# Patient Record
Sex: FEMALE | Race: BLACK OR AFRICAN AMERICAN | NOT HISPANIC OR LATINO | Employment: UNEMPLOYED | URBAN - METROPOLITAN AREA
[De-identification: names, ages, dates, MRNs, and addresses within clinical notes are randomized per-mention and may not be internally consistent; named-entity substitution may affect disease eponyms.]

---

## 2020-08-31 ENCOUNTER — HOSPITAL ENCOUNTER (OUTPATIENT)
Dept: ULTRASOUND IMAGING | Facility: HOSPITAL | Age: 60
Discharge: HOME | End: 2020-08-31
Attending: NURSE PRACTITIONER

## 2020-11-16 ENCOUNTER — HOSPITAL ENCOUNTER (OUTPATIENT)
Dept: PREADMISSION TESTING | Facility: HOSPITAL | Age: 60
Discharge: HOME | End: 2020-11-16
Attending: OTOLARYNGOLOGY

## 2020-11-17 LAB — SARS-COV-2 RNA SPEC QL NAA+PROBE: NOT DETECTED

## 2020-11-18 ENCOUNTER — HOSPITAL ENCOUNTER (OUTPATIENT)
Dept: GENERAL RADIOLOGY | Facility: HOSPITAL | Age: 60
Discharge: HOME | End: 2020-11-18
Attending: OTOLARYNGOLOGY

## 2020-11-18 LAB
ANION GAP SERPL CALC-SCNC: 11 MMOL/L (ref 8–19)
APTT BLD: 24.5 S (ref 22.2–34.2)
BASOPHILS # BLD AUTO: 0.06 10*3/UL (ref 0–0.2)
BASOPHILS NFR BLD AUTO: 0.7 % (ref 0–3)
BUN SERPL-MCNC: 11 MG/DL (ref 5–25)
BUN/CREAT SERPL: 17 {RATIO} (ref 6–20)
CALCIUM SERPL-MCNC: 10.1 MG/DL (ref 8.7–10.4)
CHLORIDE SERPL-SCNC: 108 MMOL/L (ref 99–111)
CONV ABS IMM GRAN: 0.02 10*3/UL (ref 0–0.2)
CONV CO2: 24 MMOL/L (ref 22–32)
CONV IMMATURE GRAN: 0.2 % (ref 0–1.8)
CREAT UR-MCNC: 0.66 MG/DL (ref 0.5–0.9)
DEPRECATED RDW RBC AUTO: 41.2 FL (ref 36.4–46.3)
EOSINOPHIL # BLD AUTO: 0.21 10*3/UL (ref 0–0.7)
EOSINOPHIL # BLD AUTO: 2.6 % (ref 0–7)
ERYTHROCYTE [DISTWIDTH] IN BLOOD BY AUTOMATED COUNT: 12.1 % (ref 11.7–14.4)
GFR SERPLBLD BASED ON 1.73 SQ M-ARVRAT: >60 ML/MIN/{1.73_M2}
GLUCOSE SERPL-MCNC: 104 MG/DL (ref 65–99)
HCT VFR BLD AUTO: 41.3 % (ref 37–47)
HGB BLD-MCNC: 14.3 G/DL (ref 12–16)
INR PPP: 1.01 (ref 2–3)
LYMPHOCYTES # BLD AUTO: 4.73 10*3/UL (ref 1–5)
LYMPHOCYTES NFR BLD AUTO: 57.8 % (ref 20–45)
MCH RBC QN AUTO: 32 PG (ref 27–31)
MCHC RBC AUTO-ENTMCNC: 34.6 G/DL (ref 33–37)
MCV RBC AUTO: 92.4 FL (ref 81–99)
MONOCYTES # BLD AUTO: 0.68 10*3/UL (ref 0.2–1.2)
MONOCYTES NFR BLD AUTO: 8.3 % (ref 3–10)
NEUTROPHILS # BLD AUTO: 2.49 10*3/UL (ref 2–8)
NEUTROPHILS NFR BLD AUTO: 30.4 % (ref 30–85)
NRBC CBCN: 0 % (ref 0–0.7)
OSMOLALITY SERPL CALC.SUM OF ELEC: 288 MOSM/KG (ref 273–304)
PLATELET # BLD AUTO: 337 10*3/UL (ref 130–400)
PMV BLD AUTO: 8.4 FL (ref 9.4–12.3)
POTASSIUM SERPL-SCNC: 4 MMOL/L (ref 3.5–5.3)
PROTHROMBIN TIME: 10.8 S (ref 9.4–12)
RBC # BLD AUTO: 4.47 10*6/UL (ref 4.2–5.4)
SODIUM SERPL-SCNC: 139 MMOL/L (ref 135–147)
WBC # BLD AUTO: 8.19 10*3/UL (ref 4.8–10.8)

## 2020-11-20 ENCOUNTER — HOSPITAL ENCOUNTER (OUTPATIENT)
Dept: PERIOP | Facility: HOSPITAL | Age: 60
Setting detail: HOSPITAL OUTPATIENT SURGERY
Discharge: HOME | End: 2020-11-20
Attending: OTOLARYNGOLOGY

## 2021-09-18 ENCOUNTER — APPOINTMENT (OUTPATIENT)
Dept: CT IMAGING | Facility: HOSPITAL | Age: 61
End: 2021-09-18

## 2021-09-18 ENCOUNTER — APPOINTMENT (OUTPATIENT)
Dept: MRI IMAGING | Facility: HOSPITAL | Age: 61
End: 2021-09-18

## 2021-09-18 ENCOUNTER — HOSPITAL ENCOUNTER (EMERGENCY)
Facility: HOSPITAL | Age: 61
Discharge: HOME | End: 2021-09-18
Attending: EMERGENCY MEDICINE | Admitting: EMERGENCY MEDICINE

## 2021-09-18 ENCOUNTER — APPOINTMENT (OUTPATIENT)
Dept: GENERAL RADIOLOGY | Facility: HOSPITAL | Age: 61
End: 2021-09-18

## 2021-09-18 VITALS
TEMPERATURE: 98.1 F | HEART RATE: 98 BPM | SYSTOLIC BLOOD PRESSURE: 147 MMHG | WEIGHT: 175.71 LBS | OXYGEN SATURATION: 98 % | DIASTOLIC BLOOD PRESSURE: 101 MMHG | HEIGHT: 66 IN | BODY MASS INDEX: 28.24 KG/M2 | RESPIRATION RATE: 16 BRPM

## 2021-09-18 DIAGNOSIS — H81.10 BENIGN PAROXYSMAL POSITIONAL VERTIGO, UNSPECIFIED LATERALITY: Primary | ICD-10-CM

## 2021-09-18 LAB
ALBUMIN SERPL-MCNC: 4.5 G/DL (ref 3.5–5.2)
ALBUMIN/GLOB SERPL: 1.6 G/DL
ALP SERPL-CCNC: 79 U/L (ref 39–117)
ALT SERPL W P-5'-P-CCNC: 19 U/L (ref 1–33)
ANION GAP SERPL CALCULATED.3IONS-SCNC: 10 MMOL/L (ref 5–15)
AST SERPL-CCNC: 16 U/L (ref 1–32)
BASOPHILS # BLD AUTO: 0.06 10*3/MM3 (ref 0–0.2)
BASOPHILS NFR BLD AUTO: 0.8 % (ref 0–1.5)
BILIRUB SERPL-MCNC: 0.5 MG/DL (ref 0–1.2)
BILIRUB UR QL STRIP: NEGATIVE
BUN SERPL-MCNC: 13 MG/DL (ref 8–23)
BUN/CREAT SERPL: 18.1 (ref 7–25)
CALCIUM SPEC-SCNC: 10 MG/DL (ref 8.6–10.5)
CHLORIDE SERPL-SCNC: 106 MMOL/L (ref 98–107)
CLARITY UR: CLEAR
CO2 SERPL-SCNC: 24 MMOL/L (ref 22–29)
COLOR UR: YELLOW
CREAT SERPL-MCNC: 0.72 MG/DL (ref 0.57–1)
DEPRECATED RDW RBC AUTO: 43.7 FL (ref 37–54)
EOSINOPHIL # BLD AUTO: 0.22 10*3/MM3 (ref 0–0.4)
EOSINOPHIL NFR BLD AUTO: 2.9 % (ref 0.3–6.2)
ERYTHROCYTE [DISTWIDTH] IN BLOOD BY AUTOMATED COUNT: 12.8 % (ref 12.3–15.4)
GFR SERPL CREATININE-BSD FRML MDRD: 100 ML/MIN/1.73
GLOBULIN UR ELPH-MCNC: 2.8 GM/DL
GLUCOSE BLDC GLUCOMTR-MCNC: 109 MG/DL (ref 70–99)
GLUCOSE SERPL-MCNC: 103 MG/DL (ref 65–99)
GLUCOSE UR STRIP-MCNC: NEGATIVE MG/DL
HCT VFR BLD AUTO: 45.2 % (ref 34–46.6)
HGB BLD-MCNC: 15.7 G/DL (ref 12–15.9)
HGB UR QL STRIP.AUTO: NEGATIVE
HOLD SPECIMEN: NORMAL
HOLD SPECIMEN: NORMAL
IMM GRANULOCYTES # BLD AUTO: 0.02 10*3/MM3 (ref 0–0.05)
IMM GRANULOCYTES NFR BLD AUTO: 0.3 % (ref 0–0.5)
KETONES UR QL STRIP: NEGATIVE
LEUKOCYTE ESTERASE UR QL STRIP.AUTO: NEGATIVE
LYMPHOCYTES # BLD AUTO: 4.1 10*3/MM3 (ref 0.7–3.1)
LYMPHOCYTES NFR BLD AUTO: 53.9 % (ref 19.6–45.3)
MAGNESIUM SERPL-MCNC: 2 MG/DL (ref 1.6–2.4)
MCH RBC QN AUTO: 32.2 PG (ref 26.6–33)
MCHC RBC AUTO-ENTMCNC: 34.7 G/DL (ref 31.5–35.7)
MCV RBC AUTO: 92.6 FL (ref 79–97)
MONOCYTES # BLD AUTO: 0.59 10*3/MM3 (ref 0.1–0.9)
MONOCYTES NFR BLD AUTO: 7.8 % (ref 5–12)
NEUTROPHILS NFR BLD AUTO: 2.62 10*3/MM3 (ref 1.7–7)
NEUTROPHILS NFR BLD AUTO: 34.3 % (ref 42.7–76)
NITRITE UR QL STRIP: NEGATIVE
NRBC BLD AUTO-RTO: 0 /100 WBC (ref 0–0.2)
PH UR STRIP.AUTO: 7.5 [PH] (ref 5–8)
PLATELET # BLD AUTO: 341 10*3/MM3 (ref 140–450)
PMV BLD AUTO: 8.5 FL (ref 6–12)
POTASSIUM SERPL-SCNC: 4 MMOL/L (ref 3.5–5.2)
PROT SERPL-MCNC: 7.3 G/DL (ref 6–8.5)
PROT UR QL STRIP: NEGATIVE
QT INTERVAL: 426 MS
RBC # BLD AUTO: 4.88 10*6/MM3 (ref 3.77–5.28)
SODIUM SERPL-SCNC: 140 MMOL/L (ref 136–145)
SP GR UR STRIP: 1.01 (ref 1–1.03)
TROPONIN T SERPL-MCNC: <0.01 NG/ML (ref 0–0.03)
UROBILINOGEN UR QL STRIP: NORMAL
WBC # BLD AUTO: 7.61 10*3/MM3 (ref 3.4–10.8)
WHOLE BLOOD HOLD SPECIMEN: NORMAL
WHOLE BLOOD HOLD SPECIMEN: NORMAL

## 2021-09-18 PROCEDURE — 80053 COMPREHEN METABOLIC PANEL: CPT | Performed by: EMERGENCY MEDICINE

## 2021-09-18 PROCEDURE — 25010000002 LORAZEPAM PER 2 MG: Performed by: EMERGENCY MEDICINE

## 2021-09-18 PROCEDURE — 99284 EMERGENCY DEPT VISIT MOD MDM: CPT

## 2021-09-18 PROCEDURE — 83735 ASSAY OF MAGNESIUM: CPT | Performed by: EMERGENCY MEDICINE

## 2021-09-18 PROCEDURE — 93005 ELECTROCARDIOGRAM TRACING: CPT | Performed by: EMERGENCY MEDICINE

## 2021-09-18 PROCEDURE — 96375 TX/PRO/DX INJ NEW DRUG ADDON: CPT

## 2021-09-18 PROCEDURE — 85025 COMPLETE CBC W/AUTO DIFF WBC: CPT | Performed by: EMERGENCY MEDICINE

## 2021-09-18 PROCEDURE — 71045 X-RAY EXAM CHEST 1 VIEW: CPT

## 2021-09-18 PROCEDURE — 84484 ASSAY OF TROPONIN QUANT: CPT | Performed by: EMERGENCY MEDICINE

## 2021-09-18 PROCEDURE — 70551 MRI BRAIN STEM W/O DYE: CPT

## 2021-09-18 PROCEDURE — 96374 THER/PROPH/DIAG INJ IV PUSH: CPT

## 2021-09-18 PROCEDURE — 70450 CT HEAD/BRAIN W/O DYE: CPT

## 2021-09-18 PROCEDURE — 81003 URINALYSIS AUTO W/O SCOPE: CPT | Performed by: EMERGENCY MEDICINE

## 2021-09-18 PROCEDURE — 82962 GLUCOSE BLOOD TEST: CPT

## 2021-09-18 PROCEDURE — 93010 ELECTROCARDIOGRAM REPORT: CPT | Performed by: INTERNAL MEDICINE

## 2021-09-18 PROCEDURE — 96376 TX/PRO/DX INJ SAME DRUG ADON: CPT

## 2021-09-18 PROCEDURE — 96361 HYDRATE IV INFUSION ADD-ON: CPT

## 2021-09-18 PROCEDURE — 25010000002 HYDRALAZINE PER 20 MG: Performed by: EMERGENCY MEDICINE

## 2021-09-18 RX ORDER — MECLIZINE HYDROCHLORIDE 25 MG/1
25 TABLET ORAL 3 TIMES DAILY PRN
COMMUNITY

## 2021-09-18 RX ORDER — HYDRALAZINE HYDROCHLORIDE 20 MG/ML
10 INJECTION INTRAMUSCULAR; INTRAVENOUS ONCE
Status: COMPLETED | OUTPATIENT
Start: 2021-09-18 | End: 2021-09-18

## 2021-09-18 RX ORDER — SODIUM CHLORIDE 0.9 % (FLUSH) 0.9 %
10 SYRINGE (ML) INJECTION AS NEEDED
Status: DISCONTINUED | OUTPATIENT
Start: 2021-09-18 | End: 2021-09-18 | Stop reason: HOSPADM

## 2021-09-18 RX ORDER — LORAZEPAM 2 MG/ML
1 INJECTION INTRAMUSCULAR ONCE
Status: COMPLETED | OUTPATIENT
Start: 2021-09-18 | End: 2021-09-18

## 2021-09-18 RX ORDER — LISINOPRIL 5 MG/1
5 TABLET ORAL DAILY
COMMUNITY
End: 2022-03-17

## 2021-09-18 RX ORDER — ONDANSETRON 4 MG/1
4 TABLET, ORALLY DISINTEGRATING ORAL EVERY 4 HOURS
Qty: 30 TABLET | Refills: 0 | Status: SHIPPED | OUTPATIENT
Start: 2021-09-18 | End: 2021-09-23

## 2021-09-18 RX ADMIN — SODIUM CHLORIDE 500 ML: 9 INJECTION, SOLUTION INTRAVENOUS at 10:01

## 2021-09-18 RX ADMIN — HYDRALAZINE HYDROCHLORIDE 10 MG: 20 INJECTION INTRAMUSCULAR; INTRAVENOUS at 11:36

## 2021-09-18 RX ADMIN — SODIUM CHLORIDE, PRESERVATIVE FREE 10 ML: 5 INJECTION INTRAVENOUS at 10:05

## 2021-09-18 RX ADMIN — LORAZEPAM 1 MG: 2 INJECTION INTRAMUSCULAR; INTRAVENOUS at 13:20

## 2021-09-18 RX ADMIN — HYDRALAZINE HYDROCHLORIDE 10 MG: 20 INJECTION INTRAMUSCULAR; INTRAVENOUS at 10:02

## 2021-09-18 NOTE — ED PROVIDER NOTES
"Time: 9:11 AM EDT  Arrived by: private car  Chief Complaint: dizziness  History provided by: patient  History is limited by: N/A     History of Present Illness:  Patient is a 61 y.o. year old female that presents to the emergency department with DIZZINESS. Last known well was about 2300 last night when the patient went to bed. Patient reports she woke up with dizziness at 0800. Patient describes dizziness as lightheadedness. She states she feels like \"she is going to fall.\" Dizziness is worse with standing. Patient denies the vertigo.     Patient denies any numbness/tingling, one-sided weakness or headache. Family denies the patient having any dysarthria or appearing confused.     She denies any recent illness. She denies fever, cough, diarrhea, dysuria or ear pain.     Patient reports she had a TIA about 1 year ago with similar symptoms of dizziness.     Dizziness  Quality:  Lightheadedness  Severity:  Moderate  Duration:  1 hour  Worsened by:  Standing up  Associated symptoms: no chest pain, no diarrhea, no headaches, no nausea, no shortness of breath, no vomiting and no weakness    Risk factors comment:  Hx of TIA      Patient Care Team  Primary Care Provider: Shanta Jensen APRN    Past Medical History:     No Known Allergies  Past Medical History:   Diagnosis Date   • Hypertension    • TIA (transient ischemic attack)    • Vertigo      Past Surgical History:   Procedure Laterality Date   •  SECTION     • THYROID SURGERY     • US GUIDED FINE NEEDLE ASPIRATION  2020     History reviewed. No pertinent family history.    Home Medications:  Prior to Admission medications    Not on File        Social History:   Social History     Tobacco Use   • Smoking status: Former Smoker   • Tobacco comment: quit 10 mos ago    Substance Use Topics   • Alcohol use: Never   • Drug use: Never       Review of Systems:  Review of Systems   Constitutional: Negative for chills and fever.   HENT: Negative for ear pain.  " "  Eyes: Negative for photophobia.   Respiratory: Negative for cough and shortness of breath.    Cardiovascular: Negative for chest pain.   Gastrointestinal: Negative for abdominal pain, diarrhea, nausea and vomiting.   Genitourinary: Negative for dysuria.   Musculoskeletal: Negative for back pain and neck pain.   Skin: Negative for rash.   Neurological: Positive for dizziness and light-headedness. Negative for speech difficulty, weakness, numbness and headaches.        Physical Exam:  /81   Pulse 102   Temp 97.7 °F (36.5 °C) (Oral)   Resp 18   Ht 167.6 cm (66\")   Wt 79.7 kg (175 lb 11.3 oz)   SpO2 97%   BMI 28.36 kg/m²     Physical Exam  Vitals and nursing note reviewed.   Constitutional:       General: She is not in acute distress.  HENT:      Head: Normocephalic and atraumatic.   Cardiovascular:      Rate and Rhythm: Normal rate and regular rhythm.      Heart sounds: No murmur heard.     Pulmonary:      Effort: No respiratory distress.      Breath sounds: Normal breath sounds.   Abdominal:      Palpations: Abdomen is soft.      Tenderness: There is no abdominal tenderness.   Musculoskeletal:      Cervical back: Neck supple.   Skin:     General: Skin is warm and dry.      Findings: No rash.   Neurological:      General: No focal deficit present.      Mental Status: She is alert and oriented to person, place, and time.      Cranial Nerves: Cranial nerves are intact.      Sensory: Sensation is intact.      Motor: Motor function is intact.      Coordination: Coordination is intact.                Medications in the Emergency Department:  Medications   sodium chloride 0.9 % flush 10 mL (10 mL Intravenous Given 9/18/21 1005)   sodium chloride 0.9 % bolus 500 mL (0 mL Intravenous Stopped 9/18/21 1320)   hydrALAZINE (APRESOLINE) injection 10 mg (10 mg Intravenous Given 9/18/21 1002)   hydrALAZINE (APRESOLINE) injection 10 mg (10 mg Intravenous Given 9/18/21 1136)   LORazepam (ATIVAN) injection 1 mg (1 mg " Intravenous Given 9/18/21 1320)        Labs  Lab Results (last 24 hours)     Procedure Component Value Units Date/Time    CBC & Differential [[id number]]  (Abnormal) Collected: 09/18/21 0858    Specimen: Blood from Arm, Left Updated: 09/18/21 0917    Narrative:      The following orders were created for panel order CBC & Differential.  Procedure                               Abnormality         Status                     ---------                               -----------         ------                     CBC Auto Differential[[id number]]        Abnormal            Final result                 Please view results for these tests on the individual orders.    Comprehensive Metabolic Panel [[id number]]  (Abnormal) Collected: 09/18/21 0858    Specimen: Blood from Arm, Left Updated: 09/18/21 0947     Glucose 103 mg/dL      BUN 13 mg/dL      Creatinine 0.72 mg/dL      Sodium 140 mmol/L      Potassium 4.0 mmol/L      Chloride 106 mmol/L      CO2 24.0 mmol/L      Calcium 10.0 mg/dL      Total Protein 7.3 g/dL      Albumin 4.50 g/dL      ALT (SGPT) 19 U/L      AST (SGOT) 16 U/L      Alkaline Phosphatase 79 U/L      Total Bilirubin 0.5 mg/dL      eGFR  African Amer 100 mL/min/1.73      Globulin 2.8 gm/dL      A/G Ratio 1.6 g/dL      BUN/Creatinine Ratio 18.1     Anion Gap 10.0 mmol/L     Narrative:      GFR Normal >60  Chronic Kidney Disease <60  Kidney Failure <15      Troponin [[id number]]  (Normal) Collected: 09/18/21 0858    Specimen: Blood from Arm, Left Updated: 09/18/21 0947     Troponin T <0.010 ng/mL     Narrative:      Troponin T Reference Range:  <= 0.03 ng/mL-   Negative for AMI  >0.03 ng/mL-     Abnormal for myocardial necrosis.  Clinicians would have to utilize clinical acumen, EKG, Troponin and serial changes to determine if it is an Acute Myocardial Infarction or myocardial injury due to an underlying chronic condition.       Results may be falsely decreased if patient taking Biotin.      Magnesium [[id number]]   (Normal) Collected: 09/18/21 0858    Specimen: Blood from Arm, Left Updated: 09/18/21 0947     Magnesium 2.0 mg/dL     CBC Auto Differential [[id number]]  (Abnormal) Collected: 09/18/21 0858    Specimen: Blood from Arm, Left Updated: 09/18/21 0917     WBC 7.61 10*3/mm3      RBC 4.88 10*6/mm3      Hemoglobin 15.7 g/dL      Hematocrit 45.2 %      MCV 92.6 fL      MCH 32.2 pg      MCHC 34.7 g/dL      RDW 12.8 %      RDW-SD 43.7 fl      MPV 8.5 fL      Platelets 341 10*3/mm3      Neutrophil % 34.3 %      Lymphocyte % 53.9 %      Monocyte % 7.8 %      Eosinophil % 2.9 %      Basophil % 0.8 %      Immature Grans % 0.3 %      Neutrophils, Absolute 2.62 10*3/mm3      Lymphocytes, Absolute 4.10 10*3/mm3      Monocytes, Absolute 0.59 10*3/mm3      Eosinophils, Absolute 0.22 10*3/mm3      Basophils, Absolute 0.06 10*3/mm3      Immature Grans, Absolute 0.02 10*3/mm3      nRBC 0.0 /100 WBC     POC Glucose Once [[id number]]  (Abnormal) Collected: 09/18/21 0905    Specimen: Blood Updated: 09/18/21 0906     Glucose 109 mg/dL      Comment: Serial Number: [id number]  [id number]       Urinalysis With Culture If Indicated - Urine, Clean Catch [[id number]]  (Normal) Collected: 09/18/21 0938    Specimen: Urine, Clean Catch Updated: 09/18/21 0958     Color, UA Yellow     Appearance, UA Clear     pH, UA 7.5     Specific Gravity, UA 1.007     Glucose, UA Negative     Ketones, UA Negative     Bilirubin, UA Negative     Blood, UA Negative     Protein, UA Negative     Leuk Esterase, UA Negative     Nitrite, UA Negative     Urobilinogen, UA 0.2 E.U./dL    Narrative:      Urine microscopic not indicated.           Imaging:  CT Head Without Contrast    Result Date: 9/18/2021  PROCEDURE: CT HEAD WO CONTRAST  COMPARISON:  [facility], CT, HEAD W/O CONTRAST, 8/09/2020, 18:40. INDICATIONS: dizziness  PROTOCOL:   Standard imaging protocol performed    RADIATION:   DLP: 954.7mGy*cm   MA and/or KV was adjusted to minimize radiation  dose.     TECHNIQUE: After obtaining the patient's consent, CT images were obtained without non-ionic intravenous contrast material.  FINDINGS:  There are some minimal periventricular white matter changes which could reflect more chronic small vessel ischemic change.  There is no underlying hemorrhage.  There is no midline shift.  There is vascular calcification around the carotid siphon areas and distal vertebral arteries.  The paranasal sinuses seem relatively clear.  An acute orbital abnormality is not apparent.  CONCLUSION:  1. An acute intracranial abnormality is not definitely identified. 2. Minimal periventricular white matter changes are suggested which could reflect more chronic small vessel ischemic change. 3. Atherosclerotic changes are noted.  The     CROW ATWOOD MD       Electronically Signed and Approved By: CROW ATWOOD MD on 9/18/2021 at 12:30             MRI Brain Without Contrast    Result Date: 9/18/2021  PROCEDURE: MRI BRAIN WO CONTRAST  COMPARISON: [facility], CT, CT HEAD WO CONTRAST, 9/18/2021, 12:25.  [facility], MR, BRAIN W/O CONTRAST, 7/13/2020, 13:29.  INDICATIONS: dizziness      TECHNIQUE: A variety of imaging planes and parameters were utilized for visualization of suspected pathology.  Images were performed without contrast.  FINDINGS:  There is no restricted diffusion.  There are some periventricular white matter changes as well some signal in the centrum semiovale areas that could reflect more chronic small vessel ischemic change.  There is no suggested blooming on susceptibility imaging.  The pituitary is not definitely enlarged.  No definite orbital abnormality is appreciated.  There is a mucous retention cyst or polyp in the left maxillary sinus.  CONCLUSION:  1. Progressive T2 signal changes involving the cerebral hemispheres that while nonspecific could be reflective of more chronic small vessel ischemic change.      CROW ATWOOD MD        Electronically Signed and Approved By: CROW ATWOOD MD on 9/18/2021 at 15:05             XR Chest 1 View    Result Date: 9/18/2021  PROCEDURE: XR CHEST 1 VW  COMPARISON: [facility], CR, CHEST PA/AP & LAT 2V, 3/17/2016, 22:25.  [facility], CR, CHEST PA/AP & LAT 2V, 7/13/2020, 14:59.  [facility], CR, CHEST PA/AP & LAT 2V, 11/18/2020, 16:40.  INDICATIONS: Weak/Dizzy/AMS triage protocol  FINDINGS:  The heart is not definitely enlarged.  There is prominence of interstitial markings involving the lungs.  This has been suggested and may be reflective of more chronic interstitial lung disease.  A superimposed acute on chronic process not excluded.  There are no pleural effusions.  CONCLUSION:  1. Prominence of interstitial markings involving both lungs.  This has been suggested and may be reflective of more chronic interstitial lung disease.  Correlation with patient's clinical history suggested.       CROW ATWOOD MD       Electronically Signed and Approved By: CROW ATWOOD MD on 9/18/2021 at 10:28               Procedures:  Procedures      Progress  ED Course as of Sep 18 1542   Sat Sep 18, 2021   0925 EKG interpretation: Normal sinus rhythm, rate 74, left axis deviation, normal QRS, normal LA interval, no acute ischemia.    [RP]      ED Course User Index  [RP] Maykel Kent MD                         NIHSS (NIH Stroke Scale/Score) reviewed and/or performed as part of the patient evaluation and treatment planning process.  The result associated with this review/performance is: 0        Medical Decision Making:  MDM  Number of Diagnoses or Management Options     Amount and/or Complexity of Data Reviewed  Clinical lab tests: reviewed  Tests in the radiology section of CPT®: reviewed  Tests in the medicine section of CPT®: reviewed  Independent visualization of images, tracings, or specimens: yes    Risk of Complications, Morbidity, and/or Mortality  Presenting problems:  moderate  Management options: minimal    Patient Progress  Patient progress: stable       Final diagnoses:   Benign paroxysmal positional vertigo, unspecified laterality        Disposition:  ED Disposition     ED Disposition Condition Comment    Discharge Stable           This medical record created using voice recognition software and may contain unintended errors.    Documentation assistance provided by Ankita Álvarez acting as scribe for Dr. Maykel Kent. Information recorded by the scribe was done at my direction and has been verified and validated by me.          Ankita Álvarez  09/18/21 0926       Maykel Kent MD  09/18/21 6927

## 2023-02-20 ENCOUNTER — OFFICE VISIT (OUTPATIENT)
Dept: FAMILY MEDICINE CLINIC | Facility: CLINIC | Age: 63
End: 2023-02-20
Payer: COMMERCIAL

## 2023-02-20 VITALS
BODY MASS INDEX: 27.05 KG/M2 | HEART RATE: 92 BPM | RESPIRATION RATE: 20 BRPM | SYSTOLIC BLOOD PRESSURE: 147 MMHG | OXYGEN SATURATION: 98 % | TEMPERATURE: 97.1 F | DIASTOLIC BLOOD PRESSURE: 105 MMHG | HEIGHT: 66 IN | WEIGHT: 168.3 LBS

## 2023-02-20 DIAGNOSIS — I10 PRIMARY HYPERTENSION: Primary | Chronic | ICD-10-CM

## 2023-02-20 DIAGNOSIS — Z13.29 SCREENING FOR THYROID DISORDER: ICD-10-CM

## 2023-02-20 DIAGNOSIS — F17.210 CIGARETTE NICOTINE DEPENDENCE WITHOUT COMPLICATION: Chronic | ICD-10-CM

## 2023-02-20 DIAGNOSIS — Z13.220 SCREENING, LIPID: ICD-10-CM

## 2023-02-20 DIAGNOSIS — R42 VERTIGO: Chronic | ICD-10-CM

## 2023-02-20 PROBLEM — G45.9 TRANSIENT ISCHEMIC ATTACK: Status: ACTIVE | Noted: 2023-02-20

## 2023-02-20 PROBLEM — G89.29 CHRONIC PAIN: Status: ACTIVE | Noted: 2023-02-20

## 2023-02-20 PROCEDURE — 99204 OFFICE O/P NEW MOD 45 MIN: CPT

## 2023-02-20 RX ORDER — ASPIRIN 81 MG/1
81 TABLET ORAL DAILY
COMMUNITY

## 2023-02-20 RX ORDER — LISINOPRIL 20 MG/1
20 TABLET ORAL DAILY
Qty: 30 TABLET | Refills: 2 | Status: SHIPPED | OUTPATIENT
Start: 2023-02-20 | End: 2023-03-30 | Stop reason: SDUPTHER

## 2023-02-20 RX ORDER — SCOLOPAMINE TRANSDERMAL SYSTEM 1 MG/1
PATCH, EXTENDED RELEASE TRANSDERMAL
COMMUNITY
Start: 2022-12-22 | End: 2023-02-20 | Stop reason: SDUPTHER

## 2023-02-20 RX ORDER — SCOLOPAMINE TRANSDERMAL SYSTEM 1 MG/1
1 PATCH, EXTENDED RELEASE TRANSDERMAL
Qty: 10 PATCH | Refills: 0 | Status: SHIPPED | OUTPATIENT
Start: 2023-02-20 | End: 2023-03-22

## 2023-02-20 NOTE — PROGRESS NOTES
Chief Complaint   Patient presents with   • Establish Care     Was going to [facility].    • Med Refill     Ran out of her blood pressure medication.        Subjective          Lauren Marie presents to [facility]    History of Present Illness  She is here to establish care. She was going to [facility] in [place] before she moved. She moved around 6 months ago. She has run out of her lisinopril and needs a refill on that. Her BP today is 147/105. She states it is not this high when she is taking her medication.       Past History:  Medical History: has a past medical history of Hypertension, Injury of back, Stroke (HCC), TIA (transient ischemic attack), and Vertigo.   Surgical History: has a past surgical history that includes US Guided Fine Needle Aspiration (2020); Thyroid surgery; and  section.   Family History: family history is not on file.   Social History: reports that she has been smoking cigarettes. She started smoking about 43 years ago. She has a 20.50 pack-year smoking history. She has been exposed to tobacco smoke. She has never used smokeless tobacco. She reports that she does not drink alcohol and does not use drugs.  Allergies: Patient has no known allergies.  (Not in a hospital admission)       Social History     Socioeconomic History   • Marital status:    Tobacco Use   • Smoking status: Every Day     Packs/day: 0.50     Years: 41.00     Pack years: 20.50     Types: Cigarettes     Start date:      Passive exposure: Current   • Smokeless tobacco: Never   • Tobacco comments:     Quit for two years but started back   Vaping Use   • Vaping Use: Never used   Substance and Sexual Activity   • Alcohol use: Never   • Drug use: Never   • Sexual activity: Yes     Partners: Male       Health Maintenance Due   Topic Date Due   • Pneumococcal Vaccine 0-64 (1 - PCV) Never done   • LUNG CANCER SCREENING  Never done       Objective  "    Vital Signs:   BP (!) 147/105 (BP Location: Left arm, Patient Position: Sitting)   Pulse 92   Temp 97.1 °F (36.2 °C) (Infrared)   Resp 20   Ht 167.6 cm (66\")   Wt 76.3 kg (168 lb 4.8 oz)   SpO2 98%   BMI 27.16 kg/m²       Physical Exam  Constitutional:       Appearance: Normal appearance.   HENT:      Nose: Nose normal.      Mouth/Throat:      Mouth: Mucous membranes are moist.   Cardiovascular:      Rate and Rhythm: Normal rate and regular rhythm.      Pulses: Normal pulses.      Heart sounds: Normal heart sounds.   Pulmonary:      Effort: Pulmonary effort is normal.      Breath sounds: Normal breath sounds.   Skin:     General: Skin is warm and dry.   Neurological:      General: No focal deficit present.      Mental Status: She is alert and oriented to person, place, and time.   Psychiatric:         Mood and Affect: Mood normal.         Behavior: Behavior normal.          Review of Systems   All other systems reviewed and are negative.       Result Review :                 Assessment and Plan    Diagnoses and all orders for this visit:    1. Primary hypertension (Primary)  -     lisinopril (PRINIVIL,ZESTRIL) 20 MG tablet; Take 1 tablet by mouth Daily.  Dispense: 30 tablet; Refill: 2  -     Scopolamine 1 MG/3DAYS patch; Place 1 patch on the skin as directed by provider Every 72 (Seventy-Two) Hours for 30 days.  Dispense: 10 patch; Refill: 0  -     CBC w AUTO Differential; Future  -     Comprehensive metabolic panel; Future    2. Vertigo  -     Scopolamine 1 MG/3DAYS patch; Place 1 patch on the skin as directed by provider Every 72 (Seventy-Two) Hours for 30 days.  Dispense: 10 patch; Refill: 0  -     CBC w AUTO Differential; Future  -     Comprehensive metabolic panel; Future    3. Cigarette nicotine dependence without complication    4. Screening for thyroid disorder  -     TSH+Free T4; Future    5. Screening, lipid  -     Lipid panel; Future          Pt thought to be clinically stable at this " time.    Follow Up   Return in about 3 months (around 5/20/2023).  Patient was given instructions and counseling regarding her condition or for health maintenance advice. Please see specific information pulled into the AVS if appropriate.

## 2023-03-21 ENCOUNTER — APPOINTMENT (OUTPATIENT)
Dept: GENERAL RADIOLOGY | Facility: HOSPITAL | Age: 63
End: 2023-03-21
Payer: COMMERCIAL

## 2023-03-21 ENCOUNTER — HOSPITAL ENCOUNTER (EMERGENCY)
Facility: HOSPITAL | Age: 63
Discharge: HOME | End: 2023-03-21
Attending: EMERGENCY MEDICINE | Admitting: EMERGENCY MEDICINE
Payer: COMMERCIAL

## 2023-03-21 ENCOUNTER — CLINICAL SUPPORT (OUTPATIENT)
Dept: FAMILY MEDICINE CLINIC | Facility: CLINIC | Age: 63
End: 2023-03-21
Payer: COMMERCIAL

## 2023-03-21 VITALS
DIASTOLIC BLOOD PRESSURE: 87 MMHG | SYSTOLIC BLOOD PRESSURE: 158 MMHG | TEMPERATURE: 98.1 F | WEIGHT: 166.89 LBS | BODY MASS INDEX: 26.19 KG/M2 | HEART RATE: 70 BPM | OXYGEN SATURATION: 99 % | RESPIRATION RATE: 18 BRPM | HEIGHT: 67 IN

## 2023-03-21 VITALS — DIASTOLIC BLOOD PRESSURE: 100 MMHG | SYSTOLIC BLOOD PRESSURE: 152 MMHG | HEART RATE: 83 BPM

## 2023-03-21 DIAGNOSIS — R42 DIZZINESS: Primary | ICD-10-CM

## 2023-03-21 DIAGNOSIS — R42 VERTIGO: ICD-10-CM

## 2023-03-21 DIAGNOSIS — I10 PRIMARY HYPERTENSION: Primary | ICD-10-CM

## 2023-03-21 LAB
ALBUMIN SERPL-MCNC: 4.3 G/DL (ref 3.5–5.2)
ALBUMIN/GLOB SERPL: 1.5 G/DL
ALP SERPL-CCNC: 84 U/L (ref 39–117)
ALT SERPL W P-5'-P-CCNC: 15 U/L (ref 1–33)
ANION GAP SERPL CALCULATED.3IONS-SCNC: 9.1 MMOL/L (ref 5–15)
AST SERPL-CCNC: 14 U/L (ref 1–32)
BACTERIA UR QL AUTO: ABNORMAL /HPF
BASOPHILS # BLD AUTO: 0.04 10*3/MM3 (ref 0–0.2)
BASOPHILS NFR BLD AUTO: 0.5 % (ref 0–1.5)
BILIRUB SERPL-MCNC: 0.5 MG/DL (ref 0–1.2)
BILIRUB UR QL STRIP: NEGATIVE
BUN SERPL-MCNC: 13 MG/DL (ref 8–23)
BUN/CREAT SERPL: 24.1 (ref 7–25)
CALCIUM SPEC-SCNC: 10.3 MG/DL (ref 8.6–10.5)
CHLORIDE SERPL-SCNC: 104 MMOL/L (ref 98–107)
CLARITY UR: CLEAR
CO2 SERPL-SCNC: 25.9 MMOL/L (ref 22–29)
COLOR UR: YELLOW
CREAT SERPL-MCNC: 0.54 MG/DL (ref 0.57–1)
DEPRECATED RDW RBC AUTO: 42.8 FL (ref 37–54)
EGFRCR SERPLBLD CKD-EPI 2021: 104.2 ML/MIN/1.73
EOSINOPHIL # BLD AUTO: 0.2 10*3/MM3 (ref 0–0.4)
EOSINOPHIL NFR BLD AUTO: 2.6 % (ref 0.3–6.2)
ERYTHROCYTE [DISTWIDTH] IN BLOOD BY AUTOMATED COUNT: 12.8 % (ref 12.3–15.4)
GLOBULIN UR ELPH-MCNC: 2.9 GM/DL
GLUCOSE SERPL-MCNC: 103 MG/DL (ref 65–99)
GLUCOSE UR STRIP-MCNC: NEGATIVE MG/DL
HCT VFR BLD AUTO: 44.2 % (ref 34–46.6)
HGB BLD-MCNC: 15.7 G/DL (ref 12–15.9)
HGB UR QL STRIP.AUTO: ABNORMAL
HOLD SPECIMEN: NORMAL
HOLD SPECIMEN: NORMAL
IMM GRANULOCYTES # BLD AUTO: 0.02 10*3/MM3 (ref 0–0.05)
IMM GRANULOCYTES NFR BLD AUTO: 0.3 % (ref 0–0.5)
KETONES UR QL STRIP: NEGATIVE
LEUKOCYTE ESTERASE UR QL STRIP.AUTO: NEGATIVE
LYMPHOCYTES # BLD AUTO: 2.83 10*3/MM3 (ref 0.7–3.1)
LYMPHOCYTES NFR BLD AUTO: 36.7 % (ref 19.6–45.3)
MAGNESIUM SERPL-MCNC: 1.9 MG/DL (ref 1.6–2.4)
MCH RBC QN AUTO: 32.6 PG (ref 26.6–33)
MCHC RBC AUTO-ENTMCNC: 35.5 G/DL (ref 31.5–35.7)
MCV RBC AUTO: 91.7 FL (ref 79–97)
MONOCYTES # BLD AUTO: 0.44 10*3/MM3 (ref 0.1–0.9)
MONOCYTES NFR BLD AUTO: 5.7 % (ref 5–12)
NEUTROPHILS NFR BLD AUTO: 4.18 10*3/MM3 (ref 1.7–7)
NEUTROPHILS NFR BLD AUTO: 54.2 % (ref 42.7–76)
NITRITE UR QL STRIP: NEGATIVE
NRBC BLD AUTO-RTO: 0 /100 WBC (ref 0–0.2)
NT-PROBNP SERPL-MCNC: 40 PG/ML (ref 0–900)
PH UR STRIP.AUTO: 7 [PH] (ref 5–8)
PLATELET # BLD AUTO: 331 10*3/MM3 (ref 140–450)
PMV BLD AUTO: 8 FL (ref 6–12)
POTASSIUM SERPL-SCNC: 3.7 MMOL/L (ref 3.5–5.2)
PROT SERPL-MCNC: 7.2 G/DL (ref 6–8.5)
PROT UR QL STRIP: NEGATIVE
QT INTERVAL: 437 MS
RBC # BLD AUTO: 4.82 10*6/MM3 (ref 3.77–5.28)
RBC # UR STRIP: ABNORMAL /HPF
REF LAB TEST METHOD: ABNORMAL
SODIUM SERPL-SCNC: 139 MMOL/L (ref 136–145)
SP GR UR STRIP: <=1.005 (ref 1–1.03)
SQUAMOUS #/AREA URNS HPF: ABNORMAL /HPF
TROPONIN T SERPL HS-MCNC: <6 NG/L
UROBILINOGEN UR QL STRIP: ABNORMAL
WBC # UR STRIP: ABNORMAL /HPF
WBC NRBC COR # BLD: 7.71 10*3/MM3 (ref 3.4–10.8)
WHOLE BLOOD HOLD COAG: NORMAL
WHOLE BLOOD HOLD SPECIMEN: NORMAL

## 2023-03-21 PROCEDURE — 99284 EMERGENCY DEPT VISIT MOD MDM: CPT

## 2023-03-21 PROCEDURE — 84484 ASSAY OF TROPONIN QUANT: CPT

## 2023-03-21 PROCEDURE — 71045 X-RAY EXAM CHEST 1 VIEW: CPT

## 2023-03-21 PROCEDURE — 93010 ELECTROCARDIOGRAM REPORT: CPT | Performed by: INTERNAL MEDICINE

## 2023-03-21 PROCEDURE — 93005 ELECTROCARDIOGRAM TRACING: CPT | Performed by: EMERGENCY MEDICINE

## 2023-03-21 PROCEDURE — 80053 COMPREHEN METABOLIC PANEL: CPT

## 2023-03-21 PROCEDURE — 83880 ASSAY OF NATRIURETIC PEPTIDE: CPT | Performed by: NURSE PRACTITIONER

## 2023-03-21 PROCEDURE — 83735 ASSAY OF MAGNESIUM: CPT

## 2023-03-21 PROCEDURE — 93005 ELECTROCARDIOGRAM TRACING: CPT

## 2023-03-21 PROCEDURE — 81001 URINALYSIS AUTO W/SCOPE: CPT

## 2023-03-21 PROCEDURE — 85025 COMPLETE CBC W/AUTO DIFF WBC: CPT

## 2023-03-21 RX ORDER — SODIUM CHLORIDE 0.9 % (FLUSH) 0.9 %
10 SYRINGE (ML) INJECTION AS NEEDED
Status: DISCONTINUED | OUTPATIENT
Start: 2023-03-21 | End: 2023-03-21 | Stop reason: HOSPADM

## 2023-03-21 NOTE — DISCHARGE INSTRUCTIONS
Return to the emergency department immediately for any persistent dizziness, vision changes, headache, one-sided numbness or tingling/weakness.  Since her symptoms have fully resolved here in the emergency department today we are okay with discharge but if things return or worsen we need to do further testing and potentially keep you in the hospital.  Stay well-hydrated.  I did notice that you have 2 refills available on your lisinopril that was prescribed last month.  If you have any trouble getting these refills call back and I can call your pharmacy to correct the problem.

## 2023-03-21 NOTE — ED PROVIDER NOTES
"Time: 9:55 AM EDT  Date of encounter:  3/21/2023  Independent Historian/Clinical History and Information was obtained by:   Patient  Chief Complaint   Patient presents with   • Dizziness     WAS BENT OVER THE DRYER WHEN SHE BECAME DIZZY. SHE WENT TO URGENT CARE AND THEY TOLD HER TO GO TO THE EMERGENCY ROOM BECAUSE HER BP WAS \"HIGH\"   • Hypertension       History is limited by: N/A    History of Present Illness:  Patient is a 62 y.o. year old female who presents to the emergency department for evaluation of dizziness. Patient reports that she bent over to get laundry out of the dryer and felt dizzy. Denies LOC. Denies CP. Endorses some SOA recently. Currently smoker. Hx of TIA and HTN. Dizziness has resolved. Denies unilateral weakness/numbness.   Patient states that prior to ED arrival, she was seen by MA at [facility] and advised by Dr. Jorge to come to ED for elevated hypertension. Patient states that her dizziness lasted for about an hour. Patient denies syncope but reports her dizziness was associated with light-headedness. Patient states that her dizziness worsens with walking. Patient states that she took her last blood pressure medication today. Patient denies headache or visual disturbance. Patient denies numbness or unilateral weakness. Patient states that in the past week, she had seasonal allergies. Patient denies ear pain or tinnitus.  Patient states that she has had full resolution of her dizziness. Patient states that she is walking around the ER without residual dizziness.   HPI    Patient Care Team  Primary Care Provider: Adali Kim APRN    Past Medical History:     No Known Allergies  Past Medical History:   Diagnosis Date   • Hypertension    • Injury of back    • Stroke (HCC)    • TIA (transient ischemic attack)    • Vertigo      Past Surgical History:   Procedure Laterality Date   •  SECTION     • THYROID SURGERY     • US GUIDED FINE NEEDLE ASPIRATION  2020     History reviewed. " No pertinent family history.    Home Medications:  Prior to Admission medications    Medication Sig Start Date End Date Taking? Authorizing Provider   aspirin 81 MG EC tablet Take 81 mg by mouth Daily.    Provider, Alva, MD   lisinopril (PRINIVIL,ZESTRIL) 20 MG tablet Take 1 tablet by mouth Daily. 2/20/23   Adali Kim APRN   meclizine (ANTIVERT) 25 MG tablet Take 25 mg by mouth 3 (Three) Times a Day As Needed for Dizziness.    Provider, Alva, MD   potassium chloride 10 MEQ CR tablet  3/1/22   Emergency, Nurse Epic, RN   Scopolamine 1 MG/3DAYS patch Place 1 patch on the skin as directed by provider Every 72 (Seventy-Two) Hours for 30 days. 2/20/23 3/22/23  Adali Kim APRN        Social History:   Social History     Tobacco Use   • Smoking status: Every Day     Packs/day: 0.50     Years: 41.00     Pack years: 20.50     Types: Cigarettes     Start date: 1980     Passive exposure: Current   • Smokeless tobacco: Never   • Tobacco comments:     Quit for two years but started back   Vaping Use   • Vaping Use: Never used   Substance Use Topics   • Alcohol use: Never   • Drug use: Never         Review of Systems:  Review of Systems   Constitutional: Negative for chills and fever.   HENT: Negative for congestion, rhinorrhea and sore throat.    Eyes: Negative for photophobia.   Respiratory: Positive for shortness of breath. Negative for apnea, cough and chest tightness.    Cardiovascular: Negative for chest pain, palpitations and leg swelling.   Gastrointestinal: Negative for abdominal pain, diarrhea, nausea and vomiting.   Endocrine: Negative.    Genitourinary: Negative for difficulty urinating and dysuria.   Musculoskeletal: Negative for back pain, joint swelling and myalgias.   Skin: Negative for color change and wound.   Allergic/Immunologic: Negative.    Neurological: Positive for dizziness and light-headedness. Negative for seizures and headaches.   Psychiatric/Behavioral: Negative.    All  "other systems reviewed and are negative.       Physical Exam:  /97   Pulse 71   Temp 98.2 °F (36.8 °C)   Resp 18   Ht 170.2 cm (67\")   Wt 75.7 kg (166 lb 14.2 oz)   SpO2 97%   BMI 26.14 kg/m²     Physical Exam  Vitals and nursing note reviewed.   Constitutional:       General: She is awake.      Appearance: Normal appearance.   HENT:      Head: Normocephalic and atraumatic.      Nose: Nose normal.      Mouth/Throat:      Mouth: Mucous membranes are moist.   Eyes:      Extraocular Movements: Extraocular movements intact.      Pupils: Pupils are equal, round, and reactive to light.   Cardiovascular:      Rate and Rhythm: Normal rate and regular rhythm.      Heart sounds: Normal heart sounds.   Pulmonary:      Effort: Pulmonary effort is normal. No respiratory distress.      Breath sounds: Normal breath sounds. No wheezing, rhonchi or rales.   Abdominal:      General: Bowel sounds are normal.      Palpations: Abdomen is soft.      Tenderness: There is no abdominal tenderness. There is no guarding or rebound.      Comments: No rigidity   Musculoskeletal:         General: No tenderness. Normal range of motion.      Cervical back: Normal range of motion and neck supple.   Skin:     General: Skin is warm and dry.      Capillary Refill: Capillary refill takes less than 2 seconds.      Coloration: Skin is not jaundiced.   Neurological:      General: No focal deficit present.      Mental Status: She is alert and oriented to person, place, and time. Mental status is at baseline.      Sensory: Sensation is intact.      Motor: Motor function is intact.      Coordination: Coordination is intact.   Psychiatric:         Attention and Perception: Attention and perception normal.         Mood and Affect: Mood and affect normal.         Speech: Speech normal.         Behavior: Behavior normal.         Judgment: Judgment normal.                  Procedures:  Procedures      Medical Decision Making:      Comorbidities that " affect care:    Hypertension, Smoking, TIA, Stroke, Vertigo     External Notes reviewed:    Encounter review: Patient was seen at [facility] 2/20/2023 for primary hypertension, vertigo, cigarette/nicotine dependence.      The following orders were placed and all results were independently analyzed by me:  Orders Placed This Encounter   Procedures   • XR Chest 1 View   • [place] Draw   • Comprehensive Metabolic Panel   • Single High Sensitivity Troponin T   • Magnesium   • Urinalysis With Culture If Indicated -   • CBC Auto Differential   • BNP   • Urinalysis, Microscopic Only - Urine, Clean Catch   • NPO Diet NPO Type: Strict NPO   • Undress & Gown   • Cardiac Monitoring   • Continuous Pulse Oximetry   • Vital Signs   • Orthostatic Blood Pressure   • Oxygen Therapy- Nasal Cannula; 2 LPM; Titrate for SPO2: 92%, Greater Than or Equal To   • POC Glucose Once   • ECG 12 Lead ED Triage Standing Order; Weak / Dizzy / AMS   • Insert Peripheral IV   • Fall Precautions   • CBC & Differential   • Green Top (Gel)   • Lavender Top   • Gold Top - SST   • Light Blue Top       Medications Given in the Emergency Department:  Medications   sodium chloride 0.9 % flush 10 mL (has no administration in time range)        ED Course:    The patient was initially evaluated in the triage area where orders were placed. The patient was later dispositioned by Maykel Kent MD.      The patient was advised to stay for completion of workup which includes but is not limited to communication of labs and radiological results, reassessment and plan. The patient was advised that leaving prior to disposition by a provider could result in critical findings that are not communicated to the patient.     ED Course as of 03/21/23 1143   Tue Mar 21, 2023   0953 The patient was seen and examined by me, LEVI Zacarias, while in triage. Orders placed. Patient is awaiting disposition.   [AR]   1128 EKG interpretation: Sinus rhythm with  first-degree AV block.  Heart rate 72, NC interval 223, normal QRS duration, normal QT interval, normal axis, nonspecific ST segment changes with no acute ischemia. [RP]      ED Course User Index  [AR] Yasmeen Taylor, APRN  [RP] Maykel Kent MD       Labs:    Lab Results (last 24 hours)     Procedure Component Value Units Date/Time    CBC & Differential [[id number]]  (Normal) Collected: 03/21/23 1005    Specimen: Blood Updated: 03/21/23 1013    Narrative:      The following orders were created for panel order CBC & Differential.  Procedure                               Abnormality         Status                     ---------                               -----------         ------                     CBC Auto Differential[[id number]]        Normal              Final result                 Please view results for these tests on the individual orders.    Comprehensive Metabolic Panel [[id number]]  (Abnormal) Collected: 03/21/23 1005    Specimen: Blood Updated: 03/21/23 1032     Glucose 103 mg/dL      BUN 13 mg/dL      Creatinine 0.54 mg/dL      Sodium 139 mmol/L      Potassium 3.7 mmol/L      Chloride 104 mmol/L      CO2 25.9 mmol/L      Calcium 10.3 mg/dL      Total Protein 7.2 g/dL      Albumin 4.3 g/dL      ALT (SGPT) 15 U/L      AST (SGOT) 14 U/L      Alkaline Phosphatase 84 U/L      Total Bilirubin 0.5 mg/dL      Globulin 2.9 gm/dL      A/G Ratio 1.5 g/dL      BUN/Creatinine Ratio 24.1     Anion Gap 9.1 mmol/L      eGFR 104.2 mL/min/1.73     Narrative:      GFR Normal >60  Chronic Kidney Disease <60  Kidney Failure <15      Single High Sensitivity Troponin T [[id number]]  (Normal) Collected: 03/21/23 1005    Specimen: Blood Updated: 03/21/23 1032     HS Troponin T <6 ng/L     Narrative:      High Sensitive Troponin T Reference Range:  <10.0 ng/L- Negative Female for AMI  <15.0 ng/L- Negative Male for AMI  >=10 - Abnormal Female indicating possible myocardial injury.  >=15 - Abnormal Male indicating possible  myocardial injury.   Clinicians would have to utilize clinical acumen, EKG, Troponin, and serial changes to determine if it is an Acute Myocardial Infarction or myocardial injury due to an underlying chronic condition.         Magnesium [[id number]]  (Normal) Collected: 03/21/23 1005    Specimen: Blood Updated: 03/21/23 1032     Magnesium 1.9 mg/dL     CBC Auto Differential [[id number]]  (Normal) Collected: 03/21/23 1005    Specimen: Blood Updated: 03/21/23 1013     WBC 7.71 10*3/mm3      RBC 4.82 10*6/mm3      Hemoglobin 15.7 g/dL      Hematocrit 44.2 %      MCV 91.7 fL      MCH 32.6 pg      MCHC 35.5 g/dL      RDW 12.8 %      RDW-SD 42.8 fl      MPV 8.0 fL      Platelets 331 10*3/mm3      Neutrophil % 54.2 %      Lymphocyte % 36.7 %      Monocyte % 5.7 %      Eosinophil % 2.6 %      Basophil % 0.5 %      Immature Grans % 0.3 %      Neutrophils, Absolute 4.18 10*3/mm3      Lymphocytes, Absolute 2.83 10*3/mm3      Monocytes, Absolute 0.44 10*3/mm3      Eosinophils, Absolute 0.20 10*3/mm3      Basophils, Absolute 0.04 10*3/mm3      Immature Grans, Absolute 0.02 10*3/mm3      nRBC 0.0 /100 WBC     BNP [[id number]]  (Normal) Collected: 03/21/23 1005    Specimen: Blood Updated: 03/21/23 1030     proBNP 40.0 pg/mL     Narrative:      Among patients with dyspnea, NT-proBNP is highly sensitive for the detection of acute congestive heart failure. In addition NT-proBNP of <300 pg/ml effectively rules out acute congestive heart failure with 99% negative predictive value.      Urinalysis With Culture If Indicated - Urine, Clean Catch [[id number]]  (Abnormal) Collected: 03/21/23 1044    Specimen: Urine, Clean Catch Updated: 03/21/23 1110     Color, UA Yellow     Appearance, UA Clear     pH, UA 7.0     Specific Gravity, UA <=1.005     Glucose, UA Negative     Ketones, UA Negative     Bilirubin, UA Negative     Blood, UA Moderate (2+)     Protein, UA Negative     Leuk Esterase, UA Negative     Nitrite, UA Negative     Urobilinogen,  UA 0.2 E.U./dL    Narrative:      In absence of clinical symptoms, the presence of pyuria, bacteria, and/or nitrites on the urinalysis result does not correlate with infection.    Urinalysis, Microscopic Only - Urine, Clean Catch [[id number]]  (Abnormal) Collected: 03/21/23 1044    Specimen: Urine, Clean Catch Updated: 03/21/23 1110     RBC, UA 3-5 /HPF      WBC, UA 3-5 /HPF      Comment: Urine culture not indicated.        Bacteria, UA None Seen /HPF      Squamous Epithelial Cells, UA 0-2 /HPF      Methodology Manual Light Microscopy           Imaging:    XR Chest 1 View    Result Date: 3/21/2023  PROCEDURE: XR CHEST 1 VW  COMPARISON: [facility], CR, XR CHEST 1 VW, 9/18/2021, 9:40.  INDICATIONS: DIZZINESS  FINDINGS:  LUNGS: There is mild chronic appearing basilar interstitial disease.  There are no focal infiltrates. VASCULATURE: Normal.  Unremarkable pulmonary vasculature.  CARDIAC: Normal.  No cardiac silhouette abnormality or cardiomegaly.  MEDIASTINUM: Normal.  No visible mass or adenopathy.  PLEURA: Normal.  No effusion or pleural thickening.  BONES: Normal.  No fracture or visible bony lesion.        Chronic interstitial disease.  No active disease.       SAMANTHA HERNÁNDEZ MD       Electronically Signed and Approved By: SAMANTHA HERNÁNDEZ MD on 3/21/2023 at 10:39                 Differential Diagnosis and Discussion:      Dizziness: Based on the patient's history, signs, and symptoms, the diffential diagnosis includes but is not limited to meningitis, stroke, sepsis, subarachnoid hemorrhage, intracranial bleeding, encephalitis, vertigo, electrolyte imbalance, and metabolic disorders.    All labs were reviewed and interpreted by me.  All X-rays were independently reviewed by me.  EKG was interpreted by me.    MDM  Number of Diagnoses or Management Options  Patient Progress  Patient progress: resolved (11:33 EDT Updated patient on results and plan. Patient expressed understanding and agreement. All questions  answered at this time.   )           Patient Care Considerations:    CONSULT: I considered consulting Neurology, however Symptoms are resolved.  The dizziness was with bending down/movement and there were no other focal symptoms. CT HEAD: I considered ordering a noncontrast CT of the head, however Symptoms have resolved.  At no point did the patient have a headache or any other focal neurologic deficits.      Consultants/Shared Management Plan:    None    Social Determinants of Health:    Patient is independent, reliable, and has access to care.       Disposition and Care Coordination:    Discharged: The patient is suitable and stable for discharge with no need for consideration of observation or admission.    I have explained the patient´s condition, diagnoses and treatment plan based on the information available to me at this time. I have answered questions and addressed any concerns. The patient has a good  understanding of the patient´s diagnosis, condition, and treatment plan as can be expected at this point. The vital signs have been stable. The patient´s condition is stable and appropriate for discharge from the emergency department.      The patient will pursue further outpatient evaluation with the primary care physician or other designated or consulting physician as outlined in the discharge instructions. They are agreeable to this plan of care and follow-up instructions have been explained in detail. The patient has received these instructions in written format and have expressed an understanding of the discharge instructions. The patient is aware that any significant change in condition or worsening of symptoms should prompt an immediate return to this or the closest emergency department or call to 911.    Final diagnoses:   Dizziness        ED Disposition     ED Disposition   Discharge    Condition   Stable    Comment   --             This medical record created using voice recognition  software.    Documentation assistance provided by Reina King acting as scribe for  Dr. Maykel Kent MD. Information recorded by the scribe was done at my direction and has been verified and validated by me.         Reina King  03/21/23 1136       Maykel Kent MD  03/21/23 114

## 2023-03-28 ENCOUNTER — TELEPHONE (OUTPATIENT)
Dept: FAMILY MEDICINE CLINIC | Facility: CLINIC | Age: 63
End: 2023-03-28
Payer: COMMERCIAL

## 2023-03-28 NOTE — TELEPHONE ENCOUNTER
Caller: Lauren Marie    Relationship: Self    Best call back number: [phone]    What medication are you requesting: FOR SYMPTOMS    What are your current symptoms: DIARRHEA, STOMACH ACHE, THROWING UP    How long have you been experiencing symptoms: SINCE THIS MORNING    Have you had these symptoms before:    [] Yes  [x] No    Have you been treated for these symptoms before:   [] Yes  [x] No    If a prescription is needed, what is your preferred pharmacy and phone number: [id number] [address] AT [facility] & [place] - [phone] [facility] [phone]      Additional notes: PATIENT IS REQUESTING A MEDICATION FOR HER SYMPTOMS.

## 2023-03-28 NOTE — TELEPHONE ENCOUNTER
Caller: Lauren Marie    Relationship: Self    Best call back number: [phone]    What medication are you requesting: INHALER     What are your current symptoms:  BREATHING ISSUES       How long have you been experiencing symptoms: A MONTH     Have you had these symptoms before:    [] Yes  [x] No    Have you been treated for these symptoms before:   [x] Yes  [] No    If a prescription is needed, what is your preferred pharmacy and phone number: [facility] #[id number] [address] AT [facility] & [place]  [phone] [facility] [phone]      Additional notes:     THE PATIENT SAID SINCE SHE HAS STOPPED SMOKING SHE IS OUT OF BREATH. SHE SAID SHE HAS STOPPED SMOKING FOR A MONTH. SHE IS WANTING TO KNOW IF PCP WOULD PRESCRIBE AN INHALER.. SHE SAID SHE HAD SLIGHT BREATHING ISSUES BEFORE SHE STOPPED SMOKING.     SHE IS WANTING A CALL TO ADVISE

## 2023-03-30 ENCOUNTER — OFFICE VISIT (OUTPATIENT)
Dept: FAMILY MEDICINE CLINIC | Facility: CLINIC | Age: 63
End: 2023-03-30
Payer: COMMERCIAL

## 2023-03-30 VITALS
BODY MASS INDEX: 26.3 KG/M2 | RESPIRATION RATE: 20 BRPM | OXYGEN SATURATION: 100 % | DIASTOLIC BLOOD PRESSURE: 100 MMHG | TEMPERATURE: 97.8 F | HEIGHT: 67 IN | SYSTOLIC BLOOD PRESSURE: 148 MMHG | HEART RATE: 80 BPM | WEIGHT: 167.6 LBS

## 2023-03-30 DIAGNOSIS — I10 PRIMARY HYPERTENSION: Chronic | ICD-10-CM

## 2023-03-30 RX ORDER — LISINOPRIL 40 MG/1
40 TABLET ORAL DAILY
Qty: 30 TABLET | Refills: 2 | Status: SHIPPED | OUTPATIENT
Start: 2023-03-30 | End: 2023-04-29

## 2023-03-30 NOTE — PROGRESS NOTES
Chief Complaint   Patient presents with   • Follow-up     Was having vomiting and diarrhea but feeling better with that now.    • Hypertension     Blood pressure running higher.        Subjective          Lauren Marie presents to [facility]    History of Present Illness  She is here today to follow up. She was having some vomiting and diarrhea but is feeling a bit better now. She is having some blood pressure that is elevated as well. She is currently on lisinopril 20 mg. Her BP is 148/100 today. I will increase her lisinopril to 40 mg and she should follow up in 2 weeks to reassess. We may need to add in hydrochlorothiazide at that time if not corrected.       Past History:  Medical History: has a past medical history of Hypertension, Injury of back, Stroke, TIA (transient ischemic attack), and Vertigo.   Surgical History: has a past surgical history that includes US Guided Fine Needle Aspiration (2020); Thyroid surgery; and  section.   Family History: family history is not on file.   Social History: reports that she has been smoking cigarettes. She started smoking about 43 years ago. She has a 20.50 pack-year smoking history. She has been exposed to tobacco smoke. She has never used smokeless tobacco. She reports that she does not drink alcohol and does not use drugs.  Allergies: Patient has no known allergies.  (Not in a hospital admission)       Social History     Socioeconomic History   • Marital status:    Tobacco Use   • Smoking status: Every Day     Packs/day: 0.50     Years: 41.00     Pack years: 20.50     Types: Cigarettes     Start date:      Passive exposure: Current   • Smokeless tobacco: Never   • Tobacco comments:     Quit for two years but started back   Vaping Use   • Vaping Use: Never used   Substance and Sexual Activity   • Alcohol use: Never   • Drug use: Never   • Sexual activity: Yes     Partners: Male       There are no preventive  "care reminders to display for this patient.    Objective     Vital Signs:   /100 (BP Location: Left arm, Patient Position: Sitting)   Pulse 80   Temp 97.8 °F (36.6 °C) (Infrared)   Resp 20   Ht 170.2 cm (67\")   Wt 76 kg (167 lb 9.6 oz)   SpO2 100%   BMI 26.25 kg/m²       Physical Exam  Constitutional:       Appearance: Normal appearance.   HENT:      Nose: Nose normal.      Mouth/Throat:      Mouth: Mucous membranes are moist.   Cardiovascular:      Rate and Rhythm: Normal rate and regular rhythm.      Pulses: Normal pulses.      Heart sounds: Normal heart sounds.   Pulmonary:      Effort: Pulmonary effort is normal.      Breath sounds: Normal breath sounds.   Skin:     General: Skin is warm and dry.   Neurological:      General: No focal deficit present.      Mental Status: She is alert and oriented to person, place, and time.   Psychiatric:         Mood and Affect: Mood normal.         Behavior: Behavior normal.          Review of Systems   All other systems reviewed and are negative.       Result Review :                 Assessment and Plan    Diagnoses and all orders for this visit:    1. Primary hypertension  -     lisinopril (PRINIVIL,ZESTRIL) 40 MG tablet; Take 1 tablet by mouth Daily for 30 days.  Dispense: 30 tablet; Refill: 2          Pt thought to be clinically stable at this time.    Follow Up   Return in about 2 weeks (around 4/13/2023).  Patient was given instructions and counseling regarding her condition or for health maintenance advice. Please see specific information pulled into the AVS if appropriate.       "

## 2023-09-29 DIAGNOSIS — I10 PRIMARY HYPERTENSION: Chronic | ICD-10-CM

## 2023-09-29 RX ORDER — LISINOPRIL 40 MG/1
40 TABLET ORAL DAILY
Qty: 30 TABLET | Refills: 2 | Status: CANCELLED | OUTPATIENT
Start: 2023-09-29 | End: 2023-10-29

## 2023-09-29 NOTE — TELEPHONE ENCOUNTER
Caller: Lauren Marie    Relationship: Self    Best call back number: [phone]    Requested Prescriptions:   Requested Prescriptions     Pending Prescriptions Disp Refills    lisinopril (PRINIVIL,ZESTRIL) 40 MG tablet 30 tablet 2     Sig: Take 1 tablet by mouth Daily for 30 days.        Pharmacy where request should be sent: [facility] #[id number] [address] AT [facility] & [place] - [phone]  - [phone] [facility]     Last office visit with prescribing clinician: 3/30/2023   Last telemedicine visit with prescribing clinician: Visit date not found   Next office visit with prescribing clinician: 10/9/2023     Additional details provided by patient: PATIENT HAS LESS THAN A THREE DAY SUPPLY OF MEDICATION. PLEASE SEND NEW PRESCRIPTION TO PHARMACY ASAP AS PATIENT WILL BE OUT BEFORE HER APPT.    Does the patient have less than a 3 day supply:  [x] Yes  [] No    Would you like a call back once the refill request has been completed: [] Yes [] No    If the office needs to give you a call back, can they leave a voicemail: [] Yes [] No    Hardik Mcdermott Rep   09/29/23 14:39 EDT

## 2023-10-02 DIAGNOSIS — I10 PRIMARY HYPERTENSION: Chronic | ICD-10-CM

## 2023-10-02 RX ORDER — LISINOPRIL 40 MG/1
40 TABLET ORAL DAILY
Qty: 10 TABLET | Refills: 0 | Status: SHIPPED | OUTPATIENT
Start: 2023-10-02 | End: 2023-10-09 | Stop reason: SDUPTHER

## 2023-10-09 ENCOUNTER — OFFICE VISIT (OUTPATIENT)
Dept: FAMILY MEDICINE CLINIC | Facility: CLINIC | Age: 63
End: 2023-10-09
Payer: COMMERCIAL

## 2023-10-09 VITALS
TEMPERATURE: 97.5 F | HEART RATE: 90 BPM | WEIGHT: 168.6 LBS | HEIGHT: 67 IN | OXYGEN SATURATION: 98 % | BODY MASS INDEX: 26.46 KG/M2 | SYSTOLIC BLOOD PRESSURE: 132 MMHG | DIASTOLIC BLOOD PRESSURE: 89 MMHG

## 2023-10-09 DIAGNOSIS — F17.210 CIGARETTE NICOTINE DEPENDENCE WITHOUT COMPLICATION: Chronic | ICD-10-CM

## 2023-10-09 DIAGNOSIS — I10 PRIMARY HYPERTENSION: Primary | Chronic | ICD-10-CM

## 2023-10-09 DIAGNOSIS — R06.02 SHORTNESS OF BREATH: ICD-10-CM

## 2023-10-09 DIAGNOSIS — M19.90 ARTHRITIS: ICD-10-CM

## 2023-10-09 RX ORDER — LISINOPRIL 40 MG/1
40 TABLET ORAL DAILY
Qty: 30 TABLET | Refills: 5 | Status: SHIPPED | OUTPATIENT
Start: 2023-10-09 | End: 2023-11-08

## 2023-10-09 RX ORDER — DICLOFENAC SODIUM 75 MG/1
75 TABLET, DELAYED RELEASE ORAL 2 TIMES DAILY
Qty: 60 TABLET | Refills: 2 | Status: SHIPPED | OUTPATIENT
Start: 2023-10-09

## 2023-10-09 RX ORDER — ALBUTEROL SULFATE 90 UG/1
2 AEROSOL, METERED RESPIRATORY (INHALATION) EVERY 4 HOURS PRN
Qty: 8 G | Refills: 0 | Status: SHIPPED | OUTPATIENT
Start: 2023-10-09

## 2023-10-09 NOTE — PROGRESS NOTES
Chief Complaint   Patient presents with    Med Refill     Follow up and medication refills.     Hypertension     Doesn't check blood pressure at home.     Leg Pain     Having pain in her legs and thinks it is arthritis.        Subjective          Lauren Marie presents to [facility]    History of Present Illness  Lauren is here to be seen for medication refills and follow up appointment. She states her BP at home has been better but she does not check it often. She does not have any symptoms like she used to associated with high BP like dizziness and fatigue. She is having joint pain and wants something for arthritis. I have advised her to start diclofenac as her kidney functions were good last check. We will recheck them in 6 months. She should continue on her hypertension medication.    Medications and labs reviewed with patient.       Past History:  Medical History: has a past medical history of Hypertension, Injury of back, Stroke, TIA (transient ischemic attack), and Vertigo.   Surgical History: has a past surgical history that includes US Guided Fine Needle Aspiration (2020); Thyroid surgery; and  section.   Family History: family history is not on file.   Social History: reports that she has been smoking cigarettes. She started smoking about 43 years ago. She has a 20.50 pack-year smoking history. She has been exposed to tobacco smoke. She has never used smokeless tobacco. She reports that she does not drink alcohol and does not use drugs.  Allergies: Patient has no known allergies.  (Not in a hospital admission)       Social History     Socioeconomic History    Marital status:    Tobacco Use    Smoking status: Every Day     Packs/day: 0.50     Years: 41.00     Additional pack years: 0.00     Total pack years: 20.50     Types: Cigarettes     Start date:      Passive exposure: Current    Smokeless tobacco: Never    Tobacco comments:     Quit for  "two years but started back   Vaping Use    Vaping Use: Never used   Substance and Sexual Activity    Alcohol use: Never    Drug use: Never    Sexual activity: Yes     Partners: Male       Health Maintenance Due   Topic Date Due    HEPATITIS C SCREENING  Never done       Objective     Vital Signs:   /89 (BP Location: Left arm, Patient Position: Sitting)   Pulse 90   Temp 97.5 øF (36.4 øC) (Infrared)   Ht 170.2 cm (67\")   Wt 76.5 kg (168 lb 9.6 oz)   SpO2 98%   BMI 26.41 kg/mý       Physical Exam  Constitutional:       Appearance: Normal appearance.   HENT:      Nose: Nose normal.      Mouth/Throat:      Mouth: Mucous membranes are moist.   Cardiovascular:      Rate and Rhythm: Normal rate and regular rhythm.      Pulses: Normal pulses.      Heart sounds: Normal heart sounds.   Pulmonary:      Effort: Pulmonary effort is normal.      Breath sounds: Normal breath sounds.   Skin:     General: Skin is warm and dry.   Neurological:      General: No focal deficit present.      Mental Status: She is alert and oriented to person, place, and time.   Psychiatric:         Mood and Affect: Mood normal.         Behavior: Behavior normal.          Review of Systems   Musculoskeletal:  Positive for arthralgias.   All other systems reviewed and are negative.       Result Review :                 Assessment and Plan    Diagnoses and all orders for this visit:    1. Primary hypertension (Primary)  Assessment & Plan:  Hypertension is improving with treatment.  Continue current treatment regimen.  Blood pressure will be reassessed at the next regular appointment.    Orders:  -     lisinopril (PRINIVIL,ZESTRIL) 40 MG tablet; Take 1 tablet by mouth Daily for 30 days.  Dispense: 30 tablet; Refill: 5    2. Arthritis  -     diclofenac (VOLTAREN) 75 MG EC tablet; Take 1 tablet by mouth 2 (Two) Times a Day.  Dispense: 60 tablet; Refill: 2    3. Shortness of breath  -     albuterol sulfate  (90 Base) MCG/ACT inhaler; Inhale 2 " puffs Every 4 (Four) Hours As Needed for Wheezing.  Dispense: 8 g; Refill: 0    4. Cigarette nicotine dependence without complication  Assessment & Plan:  Tobacco use is unchanged.  Smoking cessation counseling was provided.  Tobacco use will be reassessed at the next regular appointment.            Pt thought to be clinically stable at this time.    Follow Up   Return in about 6 months (around 4/9/2024).  Patient was given instructions and counseling regarding her condition or for health maintenance advice. Please see specific information pulled into the AVS if appropriate.

## 2024-03-22 ENCOUNTER — LAB (OUTPATIENT)
Dept: LAB | Facility: HOSPITAL | Age: 64
End: 2024-03-22
Payer: COMMERCIAL

## 2024-03-22 DIAGNOSIS — Z51.81 MEDICATION MONITORING ENCOUNTER: Primary | ICD-10-CM

## 2024-03-22 DIAGNOSIS — Z51.81 MEDICATION MONITORING ENCOUNTER: ICD-10-CM

## 2024-03-22 DIAGNOSIS — I10 PRIMARY HYPERTENSION: Chronic | ICD-10-CM

## 2024-03-22 LAB
ALBUMIN SERPL-MCNC: 4.6 G/DL (ref 3.5–5.2)
ALBUMIN/GLOB SERPL: 1.6 G/DL
ALP SERPL-CCNC: 77 U/L (ref 39–117)
ALT SERPL W P-5'-P-CCNC: 17 U/L (ref 1–33)
ANION GAP SERPL CALCULATED.3IONS-SCNC: 12 MMOL/L (ref 5–15)
AST SERPL-CCNC: 13 U/L (ref 1–32)
BILIRUB SERPL-MCNC: 0.5 MG/DL (ref 0–1.2)
BUN SERPL-MCNC: 14 MG/DL (ref 8–23)
BUN/CREAT SERPL: 14.4 (ref 7–25)
CALCIUM SPEC-SCNC: 10.5 MG/DL (ref 8.6–10.5)
CHLORIDE SERPL-SCNC: 103 MMOL/L (ref 98–107)
CO2 SERPL-SCNC: 25 MMOL/L (ref 22–29)
CREAT SERPL-MCNC: 0.97 MG/DL (ref 0.57–1)
EGFRCR SERPLBLD CKD-EPI 2021: 65.8 ML/MIN/1.73
GLOBULIN UR ELPH-MCNC: 2.8 GM/DL
GLUCOSE SERPL-MCNC: 92 MG/DL (ref 65–99)
POTASSIUM SERPL-SCNC: 4 MMOL/L (ref 3.5–5.2)
PROT SERPL-MCNC: 7.4 G/DL (ref 6–8.5)
SODIUM SERPL-SCNC: 140 MMOL/L (ref 136–145)

## 2024-03-22 PROCEDURE — 36415 COLL VENOUS BLD VENIPUNCTURE: CPT

## 2024-03-22 PROCEDURE — 80053 COMPREHEN METABOLIC PANEL: CPT

## 2024-03-22 RX ORDER — LISINOPRIL 40 MG/1
40 TABLET ORAL DAILY
Qty: 30 TABLET | Refills: 0 | Status: SHIPPED | OUTPATIENT
Start: 2024-03-22

## 2024-03-22 RX ORDER — LISINOPRIL 40 MG/1
40 TABLET ORAL DAILY
Qty: 30 TABLET | Refills: 0 | OUTPATIENT
Start: 2024-03-22

## 2024-03-25 ENCOUNTER — TELEPHONE (OUTPATIENT)
Dept: FAMILY MEDICINE CLINIC | Facility: CLINIC | Age: 64
End: 2024-03-25
Payer: COMMERCIAL

## 2024-03-25 NOTE — TELEPHONE ENCOUNTER
Caller: COURTNEY KAY    Relationship: SELF    Best call back number: [phone]    What medication are you requesting: PAIN MEDICATION    What are your current symptoms: N/A    How long have you been experiencing symptoms: N/A    Have you had these symptoms before:    [] Yes  [] No    Have you been treated for these symptoms before:   [] Yes  [] No    If a prescription is needed, what is your preferred pharmacy and phone number: [id number] - [address] & [place] - [phone] [facility] [phone]      Additional notes:PATIENT IS HAVING SIGNIFICANT PAIN IN HER LOWER BACK. TYLENOL AND OTHER OTC MEDS NOT WORKING. PLEASE SEND NEW PRESCRIPTION TO PHARMACY ASAP.

## 2024-04-03 DIAGNOSIS — I10 PRIMARY HYPERTENSION: Chronic | ICD-10-CM

## 2024-04-03 RX ORDER — LISINOPRIL 40 MG/1
40 TABLET ORAL DAILY
Qty: 30 TABLET | Refills: 0 | Status: SHIPPED | OUTPATIENT
Start: 2024-04-03

## 2024-05-06 ENCOUNTER — TELEPHONE (OUTPATIENT)
Dept: FAMILY MEDICINE CLINIC | Facility: CLINIC | Age: 64
End: 2024-05-06
Payer: COMMERCIAL

## 2024-05-06 DIAGNOSIS — I10 PRIMARY HYPERTENSION: Chronic | ICD-10-CM

## 2024-05-06 RX ORDER — LISINOPRIL 40 MG/1
40 TABLET ORAL DAILY
Qty: 90 TABLET | Refills: 1 | Status: SHIPPED | OUTPATIENT
Start: 2024-05-06

## 2024-05-10 ENCOUNTER — OFFICE VISIT (OUTPATIENT)
Dept: FAMILY MEDICINE CLINIC | Facility: CLINIC | Age: 64
End: 2024-05-10
Payer: COMMERCIAL

## 2024-05-10 VITALS
SYSTOLIC BLOOD PRESSURE: 147 MMHG | BODY MASS INDEX: 27.06 KG/M2 | WEIGHT: 172.4 LBS | HEIGHT: 67 IN | TEMPERATURE: 97.8 F | HEART RATE: 89 BPM | OXYGEN SATURATION: 98 % | DIASTOLIC BLOOD PRESSURE: 89 MMHG

## 2024-05-10 DIAGNOSIS — Z13.220 SCREENING, LIPID: ICD-10-CM

## 2024-05-10 DIAGNOSIS — Z13.29 SCREENING FOR THYROID DISORDER: ICD-10-CM

## 2024-05-10 DIAGNOSIS — Z11.59 NEED FOR HEPATITIS C SCREENING TEST: ICD-10-CM

## 2024-05-10 DIAGNOSIS — Z12.31 ENCOUNTER FOR SCREENING MAMMOGRAM FOR MALIGNANT NEOPLASM OF BREAST: ICD-10-CM

## 2024-05-10 DIAGNOSIS — M54.50 ACUTE RIGHT-SIDED LOW BACK PAIN WITHOUT SCIATICA: Primary | ICD-10-CM

## 2024-05-10 LAB
BILIRUB BLD-MCNC: NEGATIVE MG/DL
CLARITY, POC: ABNORMAL
COLOR UR: YELLOW
EXPIRATION DATE: ABNORMAL
GLUCOSE UR STRIP-MCNC: NEGATIVE MG/DL
KETONES UR QL: ABNORMAL
LEUKOCYTE EST, POC: NEGATIVE
Lab: ABNORMAL
NITRITE UR-MCNC: NEGATIVE MG/ML
PH UR: 6.5 [PH] (ref 5–8)
PROT UR STRIP-MCNC: NEGATIVE MG/DL
RBC # UR STRIP: ABNORMAL /UL
SP GR UR: 1.03 (ref 1–1.03)
UROBILINOGEN UR QL: NORMAL

## 2024-05-10 PROCEDURE — 87086 URINE CULTURE/COLONY COUNT: CPT

## 2024-05-10 RX ORDER — CYCLOBENZAPRINE HCL 10 MG
10 TABLET ORAL 3 TIMES DAILY PRN
Qty: 42 TABLET | Refills: 0 | Status: SHIPPED | OUTPATIENT
Start: 2024-05-10 | End: 2024-05-24

## 2024-05-12 LAB — BACTERIA SPEC AEROBE CULT: NORMAL

## 2024-08-26 ENCOUNTER — OFFICE VISIT (OUTPATIENT)
Dept: FAMILY MEDICINE CLINIC | Facility: CLINIC | Age: 64
End: 2024-08-26
Payer: COMMERCIAL

## 2024-08-26 VITALS
TEMPERATURE: 98.6 F | HEIGHT: 67 IN | HEART RATE: 82 BPM | SYSTOLIC BLOOD PRESSURE: 169 MMHG | DIASTOLIC BLOOD PRESSURE: 89 MMHG | OXYGEN SATURATION: 98 % | BODY MASS INDEX: 25.27 KG/M2 | WEIGHT: 161 LBS

## 2024-08-26 DIAGNOSIS — Z00.00 ANNUAL PHYSICAL EXAM: ICD-10-CM

## 2024-08-26 DIAGNOSIS — Z13.220 SCREENING FOR LIPID DISORDERS: ICD-10-CM

## 2024-08-26 DIAGNOSIS — M54.50 LOW BACK PAIN, UNSPECIFIED BACK PAIN LATERALITY, UNSPECIFIED CHRONICITY, UNSPECIFIED WHETHER SCIATICA PRESENT: ICD-10-CM

## 2024-08-26 DIAGNOSIS — R06.02 SHORTNESS OF BREATH: ICD-10-CM

## 2024-08-26 DIAGNOSIS — Z12.31 ENCOUNTER FOR SCREENING MAMMOGRAM FOR MALIGNANT NEOPLASM OF BREAST: ICD-10-CM

## 2024-08-26 DIAGNOSIS — R30.0 DYSURIA: ICD-10-CM

## 2024-08-26 DIAGNOSIS — R42 DIZZINESS: ICD-10-CM

## 2024-08-26 DIAGNOSIS — Z13.29 SCREENING FOR THYROID DISORDER: Primary | ICD-10-CM

## 2024-08-26 LAB
BILIRUB BLD-MCNC: NEGATIVE MG/DL
CLARITY, POC: CLEAR
COLOR UR: YELLOW
EXPIRATION DATE: ABNORMAL
GLUCOSE UR STRIP-MCNC: NEGATIVE MG/DL
KETONES UR QL: NEGATIVE
LEUKOCYTE EST, POC: NEGATIVE
Lab: ABNORMAL
NITRITE UR-MCNC: NEGATIVE MG/ML
PH UR: 5.5 [PH] (ref 5–8)
PROT UR STRIP-MCNC: ABNORMAL MG/DL
RBC # UR STRIP: ABNORMAL /UL
SP GR UR: 1.03 (ref 1–1.03)
UROBILINOGEN UR QL: NORMAL

## 2024-08-26 PROCEDURE — 99214 OFFICE O/P EST MOD 30 MIN: CPT

## 2024-08-26 PROCEDURE — 3079F DIAST BP 80-89 MM HG: CPT

## 2024-08-26 PROCEDURE — 1125F AMNT PAIN NOTED PAIN PRSNT: CPT

## 2024-08-26 PROCEDURE — 3077F SYST BP >= 140 MM HG: CPT

## 2024-08-26 RX ORDER — MECLIZINE HYDROCHLORIDE 25 MG/1
25 TABLET ORAL 3 TIMES DAILY PRN
Qty: 90 TABLET | Refills: 0 | Status: SHIPPED | OUTPATIENT
Start: 2024-08-26

## 2024-08-26 RX ORDER — TRIAMCINOLONE ACETONIDE 40 MG/ML
40 INJECTION, SUSPENSION INTRA-ARTICULAR; INTRAMUSCULAR ONCE
Status: SHIPPED | OUTPATIENT
Start: 2024-08-26

## 2024-08-26 RX ORDER — ASPIRIN 81 MG/1
81 TABLET ORAL DAILY
Qty: 30 TABLET | Refills: 5 | Status: SHIPPED | OUTPATIENT
Start: 2024-08-26

## 2024-08-26 RX ORDER — ALBUTEROL SULFATE 90 UG/1
2 AEROSOL, METERED RESPIRATORY (INHALATION) EVERY 4 HOURS PRN
Qty: 8 G | Refills: 0 | Status: SHIPPED | OUTPATIENT
Start: 2024-08-26

## 2024-08-26 RX ORDER — LIDOCAINE 50 MG/G
1 PATCH TOPICAL EVERY 24 HOURS
Qty: 14 EACH | Refills: 0 | Status: SHIPPED | OUTPATIENT
Start: 2024-08-26

## 2024-08-26 RX ORDER — METHOCARBAMOL 750 MG/1
750 TABLET, FILM COATED ORAL 4 TIMES DAILY PRN
Qty: 52 TABLET | Refills: 0 | Status: SHIPPED | OUTPATIENT
Start: 2024-08-26
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
## 2024-10-30 DIAGNOSIS — I10 PRIMARY HYPERTENSION: Chronic | ICD-10-CM

## 2024-10-30 RX ORDER — LISINOPRIL 40 MG/1
40 TABLET ORAL DAILY
Qty: 90 TABLET | Refills: 1 | Status: SHIPPED | OUTPATIENT
Start: 2024-10-30

## 2024-11-08 DIAGNOSIS — I10 PRIMARY HYPERTENSION: Chronic | ICD-10-CM

## 2024-11-08 RX ORDER — LISINOPRIL 40 MG/1
40 TABLET ORAL DAILY
Qty: 90 TABLET | Refills: 1 | Status: SHIPPED | OUTPATIENT
Start: 2024-11-08

## 2024-11-08 NOTE — TELEPHONE ENCOUNTER
Pt called saying she had went to  and her BP was high and she has been out of her bp meds and is asking for a refill. Also made her an appt for next week to follow up on the hypertension

## 2024-11-20 ENCOUNTER — OFFICE VISIT (OUTPATIENT)
Dept: FAMILY MEDICINE CLINIC | Facility: CLINIC | Age: 64
End: 2024-11-20
Payer: COMMERCIAL

## 2024-11-20 VITALS
SYSTOLIC BLOOD PRESSURE: 173 MMHG | HEIGHT: 66 IN | WEIGHT: 154 LBS | HEART RATE: 83 BPM | BODY MASS INDEX: 24.75 KG/M2 | TEMPERATURE: 99.3 F | DIASTOLIC BLOOD PRESSURE: 123 MMHG | OXYGEN SATURATION: 100 %

## 2024-11-20 DIAGNOSIS — R29.810 FACIAL DROOP: ICD-10-CM

## 2024-11-20 DIAGNOSIS — I10 PRIMARY HYPERTENSION: Primary | ICD-10-CM

## 2024-11-20 DIAGNOSIS — I10 PRIMARY HYPERTENSION: ICD-10-CM

## 2024-11-20 DIAGNOSIS — R53.1 WEAKNESS OF LEFT SIDE OF BODY: ICD-10-CM

## 2024-11-20 PROCEDURE — 3077F SYST BP >= 140 MM HG: CPT

## 2024-11-20 PROCEDURE — 1125F AMNT PAIN NOTED PAIN PRSNT: CPT

## 2024-11-20 PROCEDURE — 3080F DIAST BP >= 90 MM HG: CPT

## 2024-11-20 PROCEDURE — 99214 OFFICE O/P EST MOD 30 MIN: CPT

## 2024-11-20 RX ORDER — HYDROCHLOROTHIAZIDE 12.5 MG/1
12.5 TABLET ORAL DAILY
Qty: 30 TABLET | Refills: 0 | Status: SHIPPED | OUTPATIENT
Start: 2024-11-20

## 2024-11-20 RX ORDER — HYDROCHLOROTHIAZIDE 12.5 MG/1
12.5 TABLET ORAL DAILY
Qty: 90 TABLET | OUTPATIENT
Start: 2024-11-20

## 2024-11-20 NOTE — PROGRESS NOTES
Chief Complaint     Arm Pain (Left, numb and hard to use, physical therapy made it worse ), Back Pain, and Leg Pain    History of Present Illness     Lauren Marie is a 64 y.o. female who presents to [facility] for evaluation of arm pain.      She states she started physical therapy about 2 weeks ago. When she left the first time her back and arm pain got worse. Physical therapy has worsened the pain. Now she is having left sided facial droop and weakness on the left side. I encouraged her to go to the ER for stroke work up and she did not want to do that. She states she has been dealing with these symptoms for 2 weeks and it is not an acute issue. BP is high at 173/123. Again, encouraged her to go to the ER for the high blood pressure. She wanted to try to add on a medication first to see if this will come down. She is willing to come back tomorrow after taking the new medication for a BP recheck to ensure it is coming down. I have ordered an MRI due to her not wanting to go to the ER for work up. She is willing to see neurology and do further testing if anything is found on MRI.      History      Past Medical History:   Diagnosis Date    Hypertension     Injury of back     Stroke     TIA (transient ischemic attack)     Vertigo        Past Surgical History:   Procedure Laterality Date     SECTION      THYROID SURGERY      US GUIDED FINE NEEDLE ASPIRATION  2020       History reviewed. No pertinent family history.     Current Medications        Current Outpatient Medications:     albuterol sulfate  (90 Base) MCG/ACT inhaler, Inhale 2 puffs Every 4 (Four) Hours As Needed for Wheezing., Disp: 8 g, Rfl: 0    aspirin 81 MG EC tablet, Take 1 tablet by mouth Daily., Disp: 30 tablet, Rfl: 5    Diclofenac Sodium (Voltaren Arthritis Pain) 1 % gel gel, Apply 4 g topically to the appropriate area as directed 3 (Three) Times a Day., Disp: 150 g, Rfl: 0    lidocaine  "(LIDODERM) 5 %, Place 1 patch on the skin as directed by provider Daily. Remove & Discard patch within 12 hours or as directed by MD, Disp: 14 each, Rfl: 0    lisinopril (PRINIVIL,ZESTRIL) 40 MG tablet, Take 1 tablet by mouth Daily., Disp: 90 tablet, Rfl: 1    meclizine (ANTIVERT) 25 MG tablet, Take 1 tablet by mouth 3 (Three) Times a Day As Needed for Dizziness., Disp: 90 tablet, Rfl: 0    methocarbamol (ROBAXIN) 750 MG tablet, Take 1 tablet by mouth 4 (Four) Times a Day As Needed for Muscle Spasms., Disp: 52 tablet, Rfl: 0    potassium chloride 10 MEQ CR tablet, , Disp: , Rfl:     hydroCHLOROthiazide 12.5 MG tablet, Take 1 tablet by mouth Daily., Disp: 30 tablet, Rfl: 0     Allergies     No Known Allergies    Social History       Social History     Social History Narrative    Not on file       Immunizations     Immunization:  Immunization History   Administered Date(s) Administered    flucelvax quad pfs =>4 YRS 11/07/2019          Objective     Objective     Vital Signs:   BP (!) 173/123 (BP Location: Right arm, Patient Position: Sitting, Cuff Size: Small Adult)   Pulse 83   Temp 99.3 °F (37.4 °C) (Temporal)   Ht 167.6 cm (66\")   Wt 69.9 kg (154 lb)   SpO2 100%   BMI 24.86 kg/m²       Physical Exam  Constitutional:       Appearance: Normal appearance.   HENT:      Nose: Nose normal.      Mouth/Throat:      Mouth: Mucous membranes are moist.   Cardiovascular:      Rate and Rhythm: Normal rate and regular rhythm.      Pulses: Normal pulses.      Heart sounds: Normal heart sounds.   Pulmonary:      Effort: Pulmonary effort is normal.      Breath sounds: Normal breath sounds.   Skin:     General: Skin is warm and dry.   Neurological:      Mental Status: She is alert and oriented to person, place, and time.      Motor: Weakness (left arm and leg) present.      Comments: Left facial droop near mouth     Psychiatric:         Mood and Affect: Mood normal.         Behavior: Behavior normal.         Results    The " following data was reviewed by: LEVI Jordan on 11/20/2024:             Assessment and Plan        Assessment and Plan    Diagnoses and all orders for this visit:    1. Primary hypertension (Primary)  Comments:  Return tomorrow for recheck of BP  Assessment & Plan:  Hypertension is uncontrolled  Medication changes per orders.  Blood pressure will be reassessedat the next regular appointment.    Orders:  -     hydroCHLOROthiazide 12.5 MG tablet; Take 1 tablet by mouth Daily.  Dispense: 30 tablet; Refill: 0    2. Weakness of left side of body  Comments:  Seek treatment in ER if any worsening of symptoms at all occurs  Orders:  -     MRI Brain Without Contrast; Future    3. Facial droop  Comments:  Left sided.  MRI brain ordered  ER visit encouraged              Follow Up        Follow Up   Return if symptoms worsen or fail to improve.  Patient was given instructions and counseling regarding her condition or for health maintenance advice. Please see specific information pulled into the AVS if appropriate.

## 2024-11-20 NOTE — ASSESSMENT & PLAN NOTE
Hypertension is uncontrolled  Medication changes per orders.  Blood pressure will be reassessedat the next regular appointment.

## 2024-11-22 ENCOUNTER — APPOINTMENT (OUTPATIENT)
Dept: CT IMAGING | Facility: HOSPITAL | Age: 64
End: 2024-11-22
Payer: COMMERCIAL

## 2024-11-22 ENCOUNTER — APPOINTMENT (OUTPATIENT)
Dept: GENERAL RADIOLOGY | Facility: HOSPITAL | Age: 64
End: 2024-11-22
Payer: COMMERCIAL

## 2024-11-22 ENCOUNTER — HOSPITAL ENCOUNTER (INPATIENT)
Facility: HOSPITAL | Age: 64
LOS: 2 days | Discharge: HOME HEALTH | End: 2024-11-24
Attending: EMERGENCY MEDICINE | Admitting: STUDENT IN AN ORGANIZED HEALTH CARE EDUCATION/TRAINING PROGRAM
Payer: COMMERCIAL

## 2024-11-22 ENCOUNTER — APPOINTMENT (OUTPATIENT)
Dept: CARDIOLOGY | Facility: HOSPITAL | Age: 64
End: 2024-11-22
Payer: COMMERCIAL

## 2024-11-22 DIAGNOSIS — I63.9 CEREBROVASCULAR ACCIDENT (CVA), UNSPECIFIED MECHANISM: Primary | ICD-10-CM

## 2024-11-22 DIAGNOSIS — I63.412 CEREBROVASCULAR ACCIDENT (CVA) DUE TO EMBOLISM OF LEFT MIDDLE CEREBRAL ARTERY: ICD-10-CM

## 2024-11-22 DIAGNOSIS — R13.10 DYSPHAGIA, UNSPECIFIED TYPE: ICD-10-CM

## 2024-11-22 DIAGNOSIS — Z74.09 IMPAIRED MOBILITY AND ADLS: ICD-10-CM

## 2024-11-22 DIAGNOSIS — R26.2 DIFFICULTY IN WALKING: ICD-10-CM

## 2024-11-22 DIAGNOSIS — Z78.9 IMPAIRED MOBILITY AND ADLS: ICD-10-CM

## 2024-11-22 LAB
ALBUMIN SERPL-MCNC: 4.5 G/DL (ref 3.5–5.2)
ALBUMIN/GLOB SERPL: 1.3 G/DL
ALP SERPL-CCNC: 77 U/L (ref 39–117)
ALT SERPL W P-5'-P-CCNC: 21 U/L (ref 1–33)
ANION GAP SERPL CALCULATED.3IONS-SCNC: 10.3 MMOL/L (ref 5–15)
AST SERPL-CCNC: 18 U/L (ref 1–32)
BASOPHILS # BLD AUTO: 0.06 10*3/MM3 (ref 0–0.2)
BASOPHILS NFR BLD AUTO: 0.7 % (ref 0–1.5)
BILIRUB SERPL-MCNC: 0.4 MG/DL (ref 0–1.2)
BUN SERPL-MCNC: 12 MG/DL (ref 8–23)
BUN/CREAT SERPL: 18.5 (ref 7–25)
CALCIUM SPEC-SCNC: 10.2 MG/DL (ref 8.6–10.5)
CHLORIDE SERPL-SCNC: 100 MMOL/L (ref 98–107)
CO2 SERPL-SCNC: 27.7 MMOL/L (ref 22–29)
CREAT SERPL-MCNC: 0.65 MG/DL (ref 0.57–1)
DEPRECATED RDW RBC AUTO: 43 FL (ref 37–54)
EGFRCR SERPLBLD CKD-EPI 2021: 98.5 ML/MIN/1.73
EOSINOPHIL # BLD AUTO: 0.19 10*3/MM3 (ref 0–0.4)
EOSINOPHIL NFR BLD AUTO: 2.1 % (ref 0.3–6.2)
ERYTHROCYTE [DISTWIDTH] IN BLOOD BY AUTOMATED COUNT: 12.6 % (ref 12.3–15.4)
GLOBULIN UR ELPH-MCNC: 3.4 GM/DL
GLUCOSE BLDC GLUCOMTR-MCNC: 102 MG/DL (ref 70–99)
GLUCOSE SERPL-MCNC: 101 MG/DL (ref 65–99)
HCT VFR BLD AUTO: 45.3 % (ref 34–46.6)
HGB BLD-MCNC: 15.7 G/DL (ref 12–15.9)
HOLD SPECIMEN: NORMAL
HOLD SPECIMEN: NORMAL
IMM GRANULOCYTES # BLD AUTO: 0.02 10*3/MM3 (ref 0–0.05)
IMM GRANULOCYTES NFR BLD AUTO: 0.2 % (ref 0–0.5)
LYMPHOCYTES # BLD AUTO: 3.62 10*3/MM3 (ref 0.7–3.1)
LYMPHOCYTES NFR BLD AUTO: 39.7 % (ref 19.6–45.3)
MAGNESIUM SERPL-MCNC: 2 MG/DL (ref 1.6–2.4)
MCH RBC QN AUTO: 32 PG (ref 26.6–33)
MCHC RBC AUTO-ENTMCNC: 34.7 G/DL (ref 31.5–35.7)
MCV RBC AUTO: 92.3 FL (ref 79–97)
MONOCYTES # BLD AUTO: 0.56 10*3/MM3 (ref 0.1–0.9)
MONOCYTES NFR BLD AUTO: 6.1 % (ref 5–12)
NEUTROPHILS NFR BLD AUTO: 4.66 10*3/MM3 (ref 1.7–7)
NEUTROPHILS NFR BLD AUTO: 51.2 % (ref 42.7–76)
NRBC BLD AUTO-RTO: 0 /100 WBC (ref 0–0.2)
PLATELET # BLD AUTO: 355 10*3/MM3 (ref 140–450)
PMV BLD AUTO: 8.4 FL (ref 6–12)
POTASSIUM SERPL-SCNC: 3.3 MMOL/L (ref 3.5–5.2)
PROT SERPL-MCNC: 7.9 G/DL (ref 6–8.5)
RBC # BLD AUTO: 4.91 10*6/MM3 (ref 3.77–5.28)
SODIUM SERPL-SCNC: 138 MMOL/L (ref 136–145)
TROPONIN T SERPL HS-MCNC: <6 NG/L
WBC NRBC COR # BLD AUTO: 9.11 10*3/MM3 (ref 3.4–10.8)
WHOLE BLOOD HOLD COAG: NORMAL
WHOLE BLOOD HOLD SPECIMEN: NORMAL

## 2024-11-22 PROCEDURE — 82948 REAGENT STRIP/BLOOD GLUCOSE: CPT

## 2024-11-22 PROCEDURE — 99222 1ST HOSP IP/OBS MODERATE 55: CPT | Performed by: STUDENT IN AN ORGANIZED HEALTH CARE EDUCATION/TRAINING PROGRAM

## 2024-11-22 PROCEDURE — 85025 COMPLETE CBC W/AUTO DIFF WBC: CPT | Performed by: EMERGENCY MEDICINE

## 2024-11-22 PROCEDURE — 93010 ELECTROCARDIOGRAM REPORT: CPT | Performed by: INTERNAL MEDICINE

## 2024-11-22 PROCEDURE — 93306 TTE W/DOPPLER COMPLETE: CPT | Performed by: INTERNAL MEDICINE

## 2024-11-22 PROCEDURE — 83735 ASSAY OF MAGNESIUM: CPT | Performed by: EMERGENCY MEDICINE

## 2024-11-22 PROCEDURE — 25810000003 SODIUM CHLORIDE 0.9 % SOLUTION: Performed by: STUDENT IN AN ORGANIZED HEALTH CARE EDUCATION/TRAINING PROGRAM

## 2024-11-22 PROCEDURE — 84484 ASSAY OF TROPONIN QUANT: CPT | Performed by: EMERGENCY MEDICINE

## 2024-11-22 PROCEDURE — 80053 COMPREHEN METABOLIC PANEL: CPT | Performed by: EMERGENCY MEDICINE

## 2024-11-22 PROCEDURE — 93005 ELECTROCARDIOGRAM TRACING: CPT | Performed by: STUDENT IN AN ORGANIZED HEALTH CARE EDUCATION/TRAINING PROGRAM

## 2024-11-22 PROCEDURE — 71045 X-RAY EXAM CHEST 1 VIEW: CPT

## 2024-11-22 PROCEDURE — 93306 TTE W/DOPPLER COMPLETE: CPT

## 2024-11-22 PROCEDURE — 99291 CRITICAL CARE FIRST HOUR: CPT

## 2024-11-22 PROCEDURE — 99223 1ST HOSP IP/OBS HIGH 75: CPT | Performed by: STUDENT IN AN ORGANIZED HEALTH CARE EDUCATION/TRAINING PROGRAM

## 2024-11-22 PROCEDURE — 70450 CT HEAD/BRAIN W/O DYE: CPT

## 2024-11-22 RX ORDER — ACETAMINOPHEN 650 MG/1
650 SUPPOSITORY RECTAL EVERY 4 HOURS PRN
Status: DISCONTINUED | OUTPATIENT
Start: 2024-11-22 | End: 2024-11-24 | Stop reason: HOSPADM

## 2024-11-22 RX ORDER — SODIUM CHLORIDE 0.9 % (FLUSH) 0.9 %
10 SYRINGE (ML) INJECTION AS NEEDED
Status: DISCONTINUED | OUTPATIENT
Start: 2024-11-22 | End: 2024-11-24 | Stop reason: HOSPADM

## 2024-11-22 RX ORDER — DOCUSATE SODIUM 100 MG/1
100 CAPSULE, LIQUID FILLED ORAL 2 TIMES DAILY PRN
Status: DISCONTINUED | OUTPATIENT
Start: 2024-11-22 | End: 2024-11-24 | Stop reason: HOSPADM

## 2024-11-22 RX ORDER — ONDANSETRON 2 MG/ML
4 INJECTION INTRAMUSCULAR; INTRAVENOUS EVERY 6 HOURS PRN
Status: DISCONTINUED | OUTPATIENT
Start: 2024-11-22 | End: 2024-11-24 | Stop reason: HOSPADM

## 2024-11-22 RX ORDER — ASPIRIN 81 MG/1
81 TABLET, CHEWABLE ORAL DAILY
Status: DISCONTINUED | OUTPATIENT
Start: 2024-11-23 | End: 2024-11-24 | Stop reason: HOSPADM

## 2024-11-22 RX ORDER — ACETAMINOPHEN 325 MG/1
650 TABLET ORAL EVERY 4 HOURS PRN
Status: DISCONTINUED | OUTPATIENT
Start: 2024-11-22 | End: 2024-11-24 | Stop reason: HOSPADM

## 2024-11-22 RX ORDER — BISACODYL 10 MG
10 SUPPOSITORY, RECTAL RECTAL DAILY PRN
Status: DISCONTINUED | OUTPATIENT
Start: 2024-11-22 | End: 2024-11-24 | Stop reason: HOSPADM

## 2024-11-22 RX ORDER — SODIUM CHLORIDE 9 MG/ML
100 INJECTION, SOLUTION INTRAVENOUS CONTINUOUS
Status: DISCONTINUED | OUTPATIENT
Start: 2024-11-22 | End: 2024-11-24 | Stop reason: HOSPADM

## 2024-11-22 RX ORDER — ATORVASTATIN CALCIUM 40 MG/1
80 TABLET, FILM COATED ORAL NIGHTLY
Status: DISCONTINUED | OUTPATIENT
Start: 2024-11-22 | End: 2024-11-24 | Stop reason: HOSPADM

## 2024-11-22 RX ORDER — SODIUM CHLORIDE 9 MG/ML
40 INJECTION, SOLUTION INTRAVENOUS AS NEEDED
Status: DISCONTINUED | OUTPATIENT
Start: 2024-11-22 | End: 2024-11-24 | Stop reason: HOSPADM

## 2024-11-22 RX ORDER — CLOPIDOGREL BISULFATE 75 MG/1
300 TABLET ORAL ONCE
Status: COMPLETED | OUTPATIENT
Start: 2024-11-22 | End: 2024-11-22

## 2024-11-22 RX ORDER — CLOPIDOGREL BISULFATE 75 MG/1
75 TABLET ORAL DAILY
Status: DISCONTINUED | OUTPATIENT
Start: 2024-11-23 | End: 2024-11-24 | Stop reason: HOSPADM

## 2024-11-22 RX ORDER — ASPIRIN 300 MG/1
300 SUPPOSITORY RECTAL DAILY
Status: DISCONTINUED | OUTPATIENT
Start: 2024-11-23 | End: 2024-11-24 | Stop reason: HOSPADM

## 2024-11-22 RX ORDER — ALUMINA, MAGNESIA, AND SIMETHICONE 2400; 2400; 240 MG/30ML; MG/30ML; MG/30ML
7.5 SUSPENSION ORAL EVERY 4 HOURS PRN
Status: DISCONTINUED | OUTPATIENT
Start: 2024-11-22 | End: 2024-11-24 | Stop reason: HOSPADM

## 2024-11-22 RX ORDER — SODIUM CHLORIDE 0.9 % (FLUSH) 0.9 %
10 SYRINGE (ML) INJECTION EVERY 12 HOURS SCHEDULED
Status: DISCONTINUED | OUTPATIENT
Start: 2024-11-22 | End: 2024-11-24 | Stop reason: HOSPADM

## 2024-11-22 RX ADMIN — Medication 10 ML: at 22:46

## 2024-11-22 RX ADMIN — ACETAMINOPHEN 650 MG: 325 TABLET ORAL at 23:05

## 2024-11-22 RX ADMIN — ATORVASTATIN CALCIUM 80 MG: 40 TABLET, FILM COATED ORAL at 22:37

## 2024-11-22 RX ADMIN — SODIUM CHLORIDE 100 ML/HR: 9 INJECTION, SOLUTION INTRAVENOUS at 22:45

## 2024-11-22 RX ADMIN — CLOPIDOGREL BISULFATE 300 MG: 75 TABLET ORAL at 19:43

## 2024-11-22 NOTE — Clinical Note
Level of Care: Progressive Care [20]   Diagnosis: CVA (cerebral vascular accident) [[id number]]   Certification: I Certify That Inpatient Hospital Services Are Medically Necessary For Greater Than 2 Midnights

## 2024-11-22 NOTE — ED PROVIDER NOTES
Time: 6:16 PM EST  Date of encounter:  2024  Independent Historian/Clinical History and Information was obtained by:   Patient    History is limited by: N/A    Chief Complaint: Facial droop      History of Present Illness:  Patient is a 64 y.o. year old female who presents to the emergency department for evaluation of left facial droop.  Patient reports facial droop for few days along with some left hand weakness.      Patient Care Team  Primary Care Provider: Adali Kim APRN    Past Medical History:     No Known Allergies  Past Medical History:   Diagnosis Date    Hypertension     Injury of back     Stroke     TIA (transient ischemic attack)     Vertigo      Past Surgical History:   Procedure Laterality Date     SECTION      THYROID SURGERY      US GUIDED FINE NEEDLE ASPIRATION  2020     No family history on file.    Home Medications:  Prior to Admission medications    Medication Sig Start Date End Date Taking? Authorizing Provider   albuterol sulfate  (90 Base) MCG/ACT inhaler Inhale 2 puffs Every 4 (Four) Hours As Needed for Wheezing. 24   Adali Kim APRN   aspirin 81 MG EC tablet Take 1 tablet by mouth Daily. 24   Adali Kim APRN   Diclofenac Sodium (Voltaren Arthritis Pain) 1 % gel gel Apply 4 g topically to the appropriate area as directed 3 (Three) Times a Day. 24   Grant Wagner DO   hydroCHLOROthiazide 12.5 MG tablet Take 1 tablet by mouth Daily. 24   Adali Kim APRN   lidocaine (LIDODERM) 5 % Place 1 patch on the skin as directed by provider Daily. Remove & Discard patch within 12 hours or as directed by MD 24   Adali Kim APRN   lisinopril (PRINIVIL,ZESTRIL) 40 MG tablet Take 1 tablet by mouth Daily. 24   Adali Kim APRN   meclizine (ANTIVERT) 25 MG tablet Take 1 tablet by mouth 3 (Three) Times a Day As Needed for Dizziness. 24   Adali Kim APRN   methocarbamol (ROBAXIN) 750 MG tablet  "Take 1 tablet by mouth 4 (Four) Times a Day As Needed for Muscle Spasms. 8/26/24   Juan ManuelningAdali APRN   potassium chloride 10 MEQ CR tablet  3/1/22   Emergency, Nurse Silvia, RN        Social History:   Social History     Tobacco Use    Smoking status: Every Day     Current packs/day: 0.50     Average packs/day: 0.5 packs/day for 44.9 years (22.4 ttl pk-yrs)     Types: Cigarettes     Start date: 1980     Passive exposure: Current    Smokeless tobacco: Never    Tobacco comments:     Quit for two years but started back   Vaping Use    Vaping status: Never Used   Substance Use Topics    Alcohol use: Never    Drug use: Never         Review of Systems:  Review of Systems   Constitutional:  Negative for chills and fever.   HENT:  Negative for congestion, rhinorrhea and sore throat.    Eyes:  Negative for pain and visual disturbance.   Respiratory:  Negative for apnea, cough, chest tightness and shortness of breath.    Cardiovascular:  Negative for chest pain and palpitations.   Gastrointestinal:  Negative for abdominal pain, diarrhea, nausea and vomiting.   Genitourinary:  Negative for difficulty urinating and dysuria.   Musculoskeletal:  Negative for joint swelling and myalgias.   Skin:  Negative for color change.   Neurological:  Positive for weakness (Left face and left upper extremity). Negative for seizures and headaches.   Psychiatric/Behavioral: Negative.     All other systems reviewed and are negative.       Physical Exam:  BP (!) 158/114 (BP Location: Right arm, Patient Position: Sitting)   Pulse 84   Temp 98.2 °F (36.8 °C) (Oral)   Resp 16   Ht 167.6 cm (66\")   Wt 68.9 kg (151 lb 14.4 oz)   LMP  (LMP Unknown)   SpO2 95%   BMI 24.52 kg/m²     Physical Exam  Vitals and nursing note reviewed.   Constitutional:       General: She is not in acute distress.     Appearance: Normal appearance. She is not toxic-appearing.   HENT:      Head: Normocephalic and atraumatic.      Jaw: There is normal jaw occlusion. "   Eyes:      General: Lids are normal.      Extraocular Movements: Extraocular movements intact.      Conjunctiva/sclera: Conjunctivae normal.      Pupils: Pupils are equal, round, and reactive to light.   Cardiovascular:      Rate and Rhythm: Normal rate and regular rhythm.      Pulses: Normal pulses.      Heart sounds: Normal heart sounds.   Pulmonary:      Effort: Pulmonary effort is normal. No respiratory distress.      Breath sounds: Normal breath sounds. No wheezing or rhonchi.   Abdominal:      General: Abdomen is flat.      Palpations: Abdomen is soft.      Tenderness: There is no abdominal tenderness. There is no guarding or rebound.   Musculoskeletal:         General: Normal range of motion.      Cervical back: Normal range of motion and neck supple.      Right lower leg: No edema.      Left lower leg: No edema.   Skin:     General: Skin is warm and dry.   Neurological:      Mental Status: She is alert and oriented to person, place, and time.      Comments: Left facial weakness   Psychiatric:         Mood and Affect: Mood normal.                  Procedures:  Procedures      Medical Decision Making:      Comorbidities that affect care:    Smoking, hypertension, TIA    External Notes reviewed:    Previous Clinic Note: Family medicine office visit for hypertension left-sided weakness      The following orders were placed and all results were independently analyzed by me:  Orders Placed This Encounter   Procedures    XR Chest 1 View    CT Head Without Contrast    [place] Draw    Comprehensive Metabolic Panel    Single High Sensitivity Troponin T    Magnesium    Urinalysis With Microscopic If Indicated (No Culture) - Urine, Clean Catch    CBC Auto Differential    NPO Diet NPO Type: Strict NPO    Undress & Gown    Continuous Pulse Oximetry    Vital Signs    Orthostatic Blood Pressure    Code Status and Medical Interventions: CPR (Attempt to Resuscitate); Full Support    Inpatient Neurology Consult Stroke     Inpatient Hospitalist Consult    Oxygen Therapy- Nasal Cannula; Titrate 1-6 LPM Per SpO2; 90 - 95%    POC Glucose Once    ECG 12 Lead ED Triage Standing Order; Weak / Dizzy / AMS    Insert Peripheral IV    Inpatient Admission    Inpatient Admission    Fall Precautions    CBC & Differential    Green Top (Gel)    Lavender Top    Gold Top - SST    Light Blue Top       Medications Given in the Emergency Department:  Medications   sodium chloride 0.9 % flush 10 mL (has no administration in time range)   clopidogrel (PLAVIX) tablet 300 mg (300 mg Oral Given 11/22/24 1943)        ED Course:         Labs:    Lab Results (last 24 hours)       Procedure Component Value Units Date/Time    CBC & Differential [[id number]]  (Abnormal) Collected: 11/22/24 1736    Specimen: Blood from Arm, Right Updated: 11/22/24 1743    Narrative:      The following orders were created for panel order CBC & Differential.  Procedure                               Abnormality         Status                     ---------                               -----------         ------                     CBC Auto Differential[[id number]]        Abnormal            Final result                 Please view results for these tests on the individual orders.    Comprehensive Metabolic Panel [[id number]]  (Abnormal) Collected: 11/22/24 1736    Specimen: Blood from Arm, Right Updated: 11/22/24 1808     Glucose 101 mg/dL      BUN 12 mg/dL      Creatinine 0.65 mg/dL      Sodium 138 mmol/L      Potassium 3.3 mmol/L      Comment: Slight hemolysis detected by analyzer. Result may be falsely elevated.        Chloride 100 mmol/L      CO2 27.7 mmol/L      Calcium 10.2 mg/dL      Total Protein 7.9 g/dL      Albumin 4.5 g/dL      ALT (SGPT) 21 U/L      AST (SGOT) 18 U/L      Alkaline Phosphatase 77 U/L      Total Bilirubin 0.4 mg/dL      Globulin 3.4 gm/dL      A/G Ratio 1.3 g/dL      BUN/Creatinine Ratio 18.5     Anion Gap 10.3 mmol/L      eGFR 98.5 mL/min/1.73     Narrative:       GFR Normal >60  Chronic Kidney Disease <60  Kidney Failure <15      Single High Sensitivity Troponin T [[id number]]  (Normal) Collected: 11/22/24 1736    Specimen: Blood from Arm, Right Updated: 11/22/24 1808     HS Troponin T <6 ng/L     Narrative:      High Sensitive Troponin T Reference Range:  <14.0 ng/L- Negative Female for AMI  <22.0 ng/L- Negative Male for AMI  >=14 - Abnormal Female indicating possible myocardial injury.  >=22 - Abnormal Male indicating possible myocardial injury.   Clinicians would have to utilize clinical acumen, EKG, Troponin, and serial changes to determine if it is an Acute Myocardial Infarction or myocardial injury due to an underlying chronic condition.         Magnesium [[id number]]  (Normal) Collected: 11/22/24 1736    Specimen: Blood from Arm, Right Updated: 11/22/24 1808     Magnesium 2.0 mg/dL     CBC Auto Differential [[id number]]  (Abnormal) Collected: 11/22/24 1736    Specimen: Blood from Arm, Right Updated: 11/22/24 1743     WBC 9.11 10*3/mm3      RBC 4.91 10*6/mm3      Hemoglobin 15.7 g/dL      Hematocrit 45.3 %      MCV 92.3 fL      MCH 32.0 pg      MCHC 34.7 g/dL      RDW 12.6 %      RDW-SD 43.0 fl      MPV 8.4 fL      Platelets 355 10*3/mm3      Neutrophil % 51.2 %      Lymphocyte % 39.7 %      Monocyte % 6.1 %      Eosinophil % 2.1 %      Basophil % 0.7 %      Immature Grans % 0.2 %      Neutrophils, Absolute 4.66 10*3/mm3      Lymphocytes, Absolute 3.62 10*3/mm3      Monocytes, Absolute 0.56 10*3/mm3      Eosinophils, Absolute 0.19 10*3/mm3      Basophils, Absolute 0.06 10*3/mm3      Immature Grans, Absolute 0.02 10*3/mm3      nRBC 0.0 /100 WBC              Imaging:    CT Head Without Contrast    Result Date: 11/22/2024  CT HEAD WO CONTRAST Date of Exam: 11/22/2024 6:51 PM EST Indication: left facial droop. Comparison: 9/18/2021 Technique: Axial CT images were obtained of the head without contrast administration.  Reconstructed coronal and sagittal images were also  obtained. Automated exposure control and iterative construction methods were used. Findings: Parenchyma:No acute intraparenchymal hemorrhage. Loss of gray-white differentiation in 2 locations in the right frontal lobe concerning for acute to subacute infarcts. Mild parenchymal volume loss. Scattered periventricular and subcortical white matter hypodensities, nonspecific, but most often consistent with small vessel ischemic changes. No midline shift or herniation. Ventricles and extra axial spaces:Prominent ventricles and sulci secondary to volume loss. No extra axial fluid collection seen. Other:Orbits are grossly intact. Paranasal sinuses are clear. Mastoid air cells are clear. Calvarium is intact. Intracranial atherosclerotic calcification is present.     Impression: Suspected acute to subacute infarct within the right frontal lobe. Recommend MRI to further evaluate. Findings of acute to subacute right frontal infarcts discussed with Dr. DAVID VIEYRA by Dr. Jonathan Dozier via telephone on 11/22/2024 7:00 PM EST. Electronically Signed: Jonathan Dozier MD  11/22/2024 7:03 PM EST  Workstation ID: [id number]    XR Chest 1 View    Result Date: 11/22/2024  XR CHEST 1 VW Date of Exam: 11/22/2024 6:00 PM EST Indication: Weak/Dizzy/AMS triage protocol Comparison: 3/21/2023 Findings: Cardiomediastinal silhouette is within normal limits. No focal opacity, pleural effusion or pneumothorax. No evidence of acute osseous abnormalities. Visualized upper abdomen is unremarkable.     Impression: No radiographic evidence of acute cardiopulmonary process. Electronically Signed: Jonathan Dozier MD  11/22/2024 6:06 PM EST  Workstation ID: [id number]       Differential Diagnosis and Discussion:    Stroke: Differential diagnosis in this patient with signs of possible ischemic stroke include TIA or ischemic stroke, hemorrhagic stroke, hypoglycemic episode, toxic or metabolic encephalopathy, paresthesias.    All labs were reviewed and  interpreted by me.  All X-rays impressions were independently interpreted by me.  EKG was interpreted by me.  CT scan radiology impression was interpreted by me.    MDM  Number of Diagnoses or Management Options  Cerebrovascular accident (CVA), unspecified mechanism  Diagnosis management comments: In summary this is a 64-year-old female who presents to the Emergency Department for evaluation of left facial droop for several days, possibly up to 2 weeks.  CBC independently reviewed and interpreted by me and shows no critical abnormalities.  CMP independently reviewed and interpreted by me and shows no critical abnormalities.  CT brain reveals subacute to acute right frontal lobe insults consistent with embolic type injury.  Teleneuro was consulted and has seen evaluate the patient made recommendations.  Patient case has been discussed with the hospitalist team who will admit to the hospital for further evaluation and continuation of treatment.             The patient presents with symptoms concerning for a stroke. The patient was evaluated by me immediately upon arrival. Extensive history and physical was obtained. Family not present to give further insight into patients onset of symptoms.  CT scan findings were discussed with radiology. The case was also discussed at length with telehealth neurology. Nursing staff was instructed on how to proceed with patient care. Patient was then placed on the cardiac monitor and monitored for arrhythmia that could be contributing to stroke like symptoms. EKG was performed and evaluated by me. Patient's blood pressure was monitored and controled consistent with stroke guidelines. Treatment option's for patient's symptoms include thrombolytics after inclusion and exclusion criteria was weighed and decision was made patient is outside window. The patient's mental status and neurological symptoms were monitored throughout their stay for worsening decline.     Total Critical Care time  of 35 minutes. Total critical care time documented does not include time spent on separately billed procedures for services of nurses or physician assistants. I personally saw and examined the patient. I have reviewed all diagnostic interpretations and treatment plans as written. I was present for the key portions of any procedures performed and the inclusive time noted in any critical care statement. Critical care time includes patient management by me, time spent at the patients bedside,  time to review lab and imaging results, discussing patient care, documentation in the medical record, and time spent with family or caregiver.          Patient Care Considerations:    MRI: I considered ordering an MRI however this will be accomplished inpatient      Consultants/Shared Management Plan:    Hospitalist: I have discussed the case with Dr. Ibarra who agrees to accept the patient for admission.  Consultant: I have discussed the case with teleneuro who agrees to consult on the patient.    Social Determinants of Health:    Patient is independent, reliable, and has access to care.       Disposition and Care Coordination:    Admit:   Through independent evaluation of the patient's history, physical, and imperical data, the patient meets criteria for inpatient admission to the hospital.        Final diagnoses:   Cerebrovascular accident (CVA), unspecified mechanism        ED Disposition       ED Disposition   Decision to Admit    Condition   --    Comment   Level of Care: Progressive Care [20]   Diagnosis: CVA (cerebral vascular accident) [309320]   Certification: I Certify That Inpatient Hospital Services Are Medically Necessary For Greater Than 2 Midnights                 This medical record created using voice recognition software.             Norris Bartholomew MD  11/22/24 1952

## 2024-11-23 ENCOUNTER — APPOINTMENT (OUTPATIENT)
Dept: MRI IMAGING | Facility: HOSPITAL | Age: 64
End: 2024-11-23
Payer: COMMERCIAL

## 2024-11-23 ENCOUNTER — APPOINTMENT (OUTPATIENT)
Dept: CT IMAGING | Facility: HOSPITAL | Age: 64
End: 2024-11-23
Payer: COMMERCIAL

## 2024-11-23 PROBLEM — M54.9 CHRONIC BACK PAIN: Status: ACTIVE | Noted: 2023-02-20

## 2024-11-23 LAB
BACTERIA UR QL AUTO: ABNORMAL /HPF
BH CV ECHO MEAS - AO MAX PG: 4.8 MMHG
BH CV ECHO MEAS - AO MEAN PG: 3 MMHG
BH CV ECHO MEAS - AO ROOT DIAM: 2.9 CM
BH CV ECHO MEAS - AO V2 MAX: 109 CM/SEC
BH CV ECHO MEAS - AO V2 VTI: 18.3 CM
BH CV ECHO MEAS - AVA(I,D): 2.8 CM2
BH CV ECHO MEAS - EDV(CUBED): 74.1 ML
BH CV ECHO MEAS - EDV(MOD-SP2): 59.8 ML
BH CV ECHO MEAS - EDV(MOD-SP4): 65 ML
BH CV ECHO MEAS - EF(MOD-BP): 52.3 %
BH CV ECHO MEAS - EF(MOD-SP2): 51.3 %
BH CV ECHO MEAS - EF(MOD-SP4): 53.4 %
BH CV ECHO MEAS - ESV(CUBED): 29.8 ML
BH CV ECHO MEAS - ESV(MOD-SP2): 29.1 ML
BH CV ECHO MEAS - ESV(MOD-SP4): 30.3 ML
BH CV ECHO MEAS - FS: 26.2 %
BH CV ECHO MEAS - IVS/LVPW: 0.91 CM
BH CV ECHO MEAS - IVSD: 1 CM
BH CV ECHO MEAS - LA DIMENSION: 2.6 CM
BH CV ECHO MEAS - LAT PEAK E' VEL: 7.4 CM/SEC
BH CV ECHO MEAS - LV MASS(C)D: 147 GRAMS
BH CV ECHO MEAS - LV MAX PG: 3.3 MMHG
BH CV ECHO MEAS - LV MEAN PG: 2 MMHG
BH CV ECHO MEAS - LV V1 MAX: 90.2 CM/SEC
BH CV ECHO MEAS - LV V1 VTI: 16.1 CM
BH CV ECHO MEAS - LVIDD: 4.2 CM
BH CV ECHO MEAS - LVIDS: 3.1 CM
BH CV ECHO MEAS - LVOT AREA: 3.1 CM2
BH CV ECHO MEAS - LVOT DIAM: 2 CM
BH CV ECHO MEAS - LVPWD: 1.1 CM
BH CV ECHO MEAS - MED PEAK E' VEL: 6.1 CM/SEC
BH CV ECHO MEAS - MV A MAX VEL: 98 CM/SEC
BH CV ECHO MEAS - MV DEC SLOPE: 326 CM/SEC2
BH CV ECHO MEAS - MV DEC TIME: 0.2 SEC
BH CV ECHO MEAS - MV E MAX VEL: 65.5 CM/SEC
BH CV ECHO MEAS - MV E/A: 0.67
BH CV ECHO MEAS - RVDD: 2.7 CM
BH CV ECHO MEAS - SV(LVOT): 50.6 ML
BH CV ECHO MEAS - SV(MOD-SP2): 30.7 ML
BH CV ECHO MEAS - SV(MOD-SP4): 34.7 ML
BH CV ECHO MEASUREMENTS AVERAGE E/E' RATIO: 9.7
BH CV ECHO SHUNT ASSESSMENT PERFORMED (HIDDEN SCRIPTING): 1
BILIRUB UR QL STRIP: NEGATIVE
CHOLEST SERPL-MCNC: 188 MG/DL (ref 0–200)
CLARITY UR: CLEAR
COLOR UR: YELLOW
CRP SERPL-MCNC: <0.3 MG/DL (ref 0–0.5)
ERYTHROCYTE [SEDIMENTATION RATE] IN BLOOD: 2 MM/HR (ref 0–30)
GLUCOSE BLDC GLUCOMTR-MCNC: 81 MG/DL (ref 70–99)
GLUCOSE BLDC GLUCOMTR-MCNC: 88 MG/DL (ref 70–99)
GLUCOSE UR STRIP-MCNC: NEGATIVE MG/DL
HBA1C MFR BLD: 5.2 % (ref 4.8–5.6)
HDLC SERPL-MCNC: 38 MG/DL (ref 40–60)
HGB UR QL STRIP.AUTO: NEGATIVE
HYALINE CASTS UR QL AUTO: ABNORMAL /LPF
KETONES UR QL STRIP: NEGATIVE
LDLC SERPL CALC-MCNC: 130 MG/DL (ref 0–100)
LDLC/HDLC SERPL: 3.36 {RATIO}
LEFT ATRIUM VOLUME INDEX: 15.1 ML/M2
LEUKOCYTE ESTERASE UR QL STRIP.AUTO: ABNORMAL
NITRITE UR QL STRIP: NEGATIVE
PH UR STRIP.AUTO: 5.5 [PH] (ref 5–8)
PROT UR QL STRIP: NEGATIVE
QT INTERVAL: 385 MS
QTC INTERVAL: 452 MS
RBC # UR STRIP: ABNORMAL /HPF
REF LAB TEST METHOD: ABNORMAL
SP GR UR STRIP: 1.01 (ref 1–1.03)
SQUAMOUS #/AREA URNS HPF: ABNORMAL /HPF
TRIGL SERPL-MCNC: 112 MG/DL (ref 0–150)
UROBILINOGEN UR QL STRIP: ABNORMAL
VLDLC SERPL-MCNC: 20 MG/DL (ref 5–40)
WBC # UR STRIP: ABNORMAL /HPF
WHOLE BLOOD HOLD SPECIMEN: NORMAL

## 2024-11-23 PROCEDURE — 81001 URINALYSIS AUTO W/SCOPE: CPT | Performed by: EMERGENCY MEDICINE

## 2024-11-23 PROCEDURE — 83036 HEMOGLOBIN GLYCOSYLATED A1C: CPT | Performed by: STUDENT IN AN ORGANIZED HEALTH CARE EDUCATION/TRAINING PROGRAM

## 2024-11-23 PROCEDURE — 70498 CT ANGIOGRAPHY NECK: CPT

## 2024-11-23 PROCEDURE — 92610 EVALUATE SWALLOWING FUNCTION: CPT

## 2024-11-23 PROCEDURE — 97165 OT EVAL LOW COMPLEX 30 MIN: CPT

## 2024-11-23 PROCEDURE — 99233 SBSQ HOSP IP/OBS HIGH 50: CPT | Performed by: STUDENT IN AN ORGANIZED HEALTH CARE EDUCATION/TRAINING PROGRAM

## 2024-11-23 PROCEDURE — 80061 LIPID PANEL: CPT | Performed by: STUDENT IN AN ORGANIZED HEALTH CARE EDUCATION/TRAINING PROGRAM

## 2024-11-23 PROCEDURE — 25510000001 IOPAMIDOL PER 1 ML: Performed by: STUDENT IN AN ORGANIZED HEALTH CARE EDUCATION/TRAINING PROGRAM

## 2024-11-23 PROCEDURE — 70551 MRI BRAIN STEM W/O DYE: CPT

## 2024-11-23 PROCEDURE — 86140 C-REACTIVE PROTEIN: CPT | Performed by: STUDENT IN AN ORGANIZED HEALTH CARE EDUCATION/TRAINING PROGRAM

## 2024-11-23 PROCEDURE — 70496 CT ANGIOGRAPHY HEAD: CPT

## 2024-11-23 PROCEDURE — 94799 UNLISTED PULMONARY SVC/PX: CPT

## 2024-11-23 PROCEDURE — 25810000003 SODIUM CHLORIDE 0.9 % SOLUTION: Performed by: STUDENT IN AN ORGANIZED HEALTH CARE EDUCATION/TRAINING PROGRAM

## 2024-11-23 PROCEDURE — 85652 RBC SED RATE AUTOMATED: CPT | Performed by: STUDENT IN AN ORGANIZED HEALTH CARE EDUCATION/TRAINING PROGRAM

## 2024-11-23 PROCEDURE — 94761 N-INVAS EAR/PLS OXIMETRY MLT: CPT

## 2024-11-23 PROCEDURE — 97161 PT EVAL LOW COMPLEX 20 MIN: CPT | Performed by: PHYSICAL THERAPIST

## 2024-11-23 PROCEDURE — 82948 REAGENT STRIP/BLOOD GLUCOSE: CPT | Performed by: STUDENT IN AN ORGANIZED HEALTH CARE EDUCATION/TRAINING PROGRAM

## 2024-11-23 PROCEDURE — 82948 REAGENT STRIP/BLOOD GLUCOSE: CPT

## 2024-11-23 PROCEDURE — 99233 SBSQ HOSP IP/OBS HIGH 50: CPT | Performed by: PSYCHIATRY & NEUROLOGY

## 2024-11-23 RX ORDER — MECLIZINE HYDROCHLORIDE 25 MG/1
25 TABLET ORAL 3 TIMES DAILY PRN
Status: DISCONTINUED | OUTPATIENT
Start: 2024-11-23 | End: 2024-11-24 | Stop reason: HOSPADM

## 2024-11-23 RX ORDER — IOPAMIDOL 755 MG/ML
100 INJECTION, SOLUTION INTRAVASCULAR
Status: COMPLETED | OUTPATIENT
Start: 2024-11-23 | End: 2024-11-23

## 2024-11-23 RX ORDER — ALBUTEROL SULFATE 0.83 MG/ML
2.5 SOLUTION RESPIRATORY (INHALATION) EVERY 6 HOURS PRN
Status: DISCONTINUED | OUTPATIENT
Start: 2024-11-23 | End: 2024-11-24 | Stop reason: HOSPADM

## 2024-11-23 RX ORDER — HYDROCHLOROTHIAZIDE 12.5 MG/1
12.5 TABLET ORAL DAILY
Status: DISCONTINUED | OUTPATIENT
Start: 2024-11-23 | End: 2024-11-24 | Stop reason: HOSPADM

## 2024-11-23 RX ORDER — OXYCODONE AND ACETAMINOPHEN 5; 325 MG/1; MG/1
1 TABLET ORAL EVERY 6 HOURS PRN
Status: DISCONTINUED | OUTPATIENT
Start: 2024-11-23 | End: 2024-11-24 | Stop reason: HOSPADM

## 2024-11-23 RX ORDER — DEXTROSE MONOHYDRATE 25 G/50ML
25 INJECTION, SOLUTION INTRAVENOUS
Status: DISCONTINUED | OUTPATIENT
Start: 2024-11-23 | End: 2024-11-23

## 2024-11-23 RX ORDER — IBUPROFEN 600 MG/1
1 TABLET ORAL
Status: DISCONTINUED | OUTPATIENT
Start: 2024-11-23 | End: 2024-11-23

## 2024-11-23 RX ORDER — METHOCARBAMOL 750 MG/1
750 TABLET, FILM COATED ORAL 4 TIMES DAILY PRN
Status: DISCONTINUED | OUTPATIENT
Start: 2024-11-23 | End: 2024-11-24 | Stop reason: HOSPADM

## 2024-11-23 RX ORDER — NICOTINE POLACRILEX 4 MG
15 LOZENGE BUCCAL
Status: DISCONTINUED | OUTPATIENT
Start: 2024-11-23 | End: 2024-11-23

## 2024-11-23 RX ORDER — INSULIN LISPRO 100 [IU]/ML
2-9 INJECTION, SOLUTION INTRAVENOUS; SUBCUTANEOUS
Status: DISCONTINUED | OUTPATIENT
Start: 2024-11-23 | End: 2024-11-23

## 2024-11-23 RX ORDER — NICOTINE 21 MG/24HR
1 PATCH, TRANSDERMAL 24 HOURS TRANSDERMAL
Status: DISCONTINUED | OUTPATIENT
Start: 2024-11-23 | End: 2024-11-24 | Stop reason: HOSPADM

## 2024-11-23 RX ORDER — LISINOPRIL 20 MG/1
40 TABLET ORAL DAILY
Status: DISCONTINUED | OUTPATIENT
Start: 2024-11-23 | End: 2024-11-24 | Stop reason: HOSPADM

## 2024-11-23 RX ADMIN — CLOPIDOGREL BISULFATE 75 MG: 75 TABLET ORAL at 09:09

## 2024-11-23 RX ADMIN — SODIUM CHLORIDE 100 ML/HR: 9 INJECTION, SOLUTION INTRAVENOUS at 19:09

## 2024-11-23 RX ADMIN — ATORVASTATIN CALCIUM 80 MG: 40 TABLET, FILM COATED ORAL at 20:42

## 2024-11-23 RX ADMIN — HYDROCHLOROTHIAZIDE 12.5 MG: 12.5 TABLET ORAL at 09:33

## 2024-11-23 RX ADMIN — IOPAMIDOL 100 ML: 755 INJECTION, SOLUTION INTRAVENOUS at 08:34

## 2024-11-23 RX ADMIN — Medication 10 ML: at 20:44

## 2024-11-23 RX ADMIN — OXYCODONE AND ACETAMINOPHEN 1 TABLET: 5; 325 TABLET ORAL at 13:42

## 2024-11-23 RX ADMIN — Medication 10 ML: at 09:09

## 2024-11-23 RX ADMIN — DICLOFENAC SODIUM 4 G: 10 GEL TOPICAL at 09:09

## 2024-11-23 RX ADMIN — OXYCODONE AND ACETAMINOPHEN 1 TABLET: 5; 325 TABLET ORAL at 01:36

## 2024-11-23 RX ADMIN — DICLOFENAC SODIUM 4 G: 10 GEL TOPICAL at 20:43

## 2024-11-23 RX ADMIN — OXYCODONE AND ACETAMINOPHEN 1 TABLET: 5; 325 TABLET ORAL at 20:42

## 2024-11-23 RX ADMIN — LISINOPRIL 40 MG: 20 TABLET ORAL at 09:09

## 2024-11-23 RX ADMIN — ASPIRIN 81 MG: 81 TABLET, CHEWABLE ORAL at 09:09

## 2024-11-23 RX ADMIN — SODIUM CHLORIDE 100 ML/HR: 9 INJECTION, SOLUTION INTRAVENOUS at 09:34

## 2024-11-23 NOTE — THERAPY EVALUATION
Acute Care - Physical Therapy Initial Evaluation  [facility]     Patient Name: Lauren Marie  : 1960  MRN: [id number]  Today's Date: 2024      Visit Dx:     ICD-10-CM ICD-9-CM   1. Cerebrovascular accident (CVA), unspecified mechanism  I63.9 434.91   2. Impaired mobility and ADLs  Z74.09 V49.89    Z78.9    3. Dysphagia, unspecified type  R13.10 787.20   4. Difficulty in walking  R26.2 719.7     Patient Active Problem List   Diagnosis    Cigarette nicotine dependence without complication    Transient ischemic attack    Chronic back pain    Vertigo    Primary hypertension    CVA (cerebral vascular accident)     Past Medical History:   Diagnosis Date    Hypertension     Injury of back     Stroke     TIA (transient ischemic attack)     Vertigo      Past Surgical History:   Procedure Laterality Date     SECTION      THYROID SURGERY      US GUIDED FINE NEEDLE ASPIRATION  2020     PT Assessment (Last 12 Hours)       PT Evaluation and Treatment       Row Name 24 1300          Physical Therapy Time and Intention    Subjective Information complains of;weakness;fatigue  -TC     Document Type evaluation  -TC     Mode of Treatment individual therapy;physical therapy  -TC     Patient Effort good  -TC       Row Name 24 1300          General Information    Patient Profile Reviewed yes  -TC     Patient Observations alert;cooperative;agree to therapy  -TC     Patient/Family/Caregiver Comments/Observations Pt states that she visted a physical therapy clinic in [facility] where she was placed on a table and strapped in and her muscles were stretched and this took place on Oct. 28th, 2024. Pt states she has not been back to this therapy clinic. Pt states her current symptoms started a few days ago.  -TC     Prior Level of Function independent:;all household mobility;community mobility  -TC     Equipment Currently Used at Home none  -TC     Existing Precautions/Restrictions fall  -TC       Row  Name 11/23/24 1300          Living Environment    Current Living Arrangements home  -     People in Home grandchild(hernandez);spouse  -TC     Primary Care Provided by self  -TC       Row Name 11/23/24 1300          Home Use of Assistive/Adaptive Equipment    Equipment Currently Used at Home none  -TC       Row Name 11/23/24 1300          Pain    Pretreatment Pain Rating 0/10 - no pain  -TC     Posttreatment Pain Rating 0/10 - no pain  -TC       Row Name 11/23/24 1300          Range of Motion (ROM)    Range of Motion ROM is WFL  -TC       Row Name 11/23/24 1300          Range of Motion Comprehensive    General Range of Motion no range of motion deficits identified  -       Row Name 11/23/24 1300          Strength (Manual Muscle Testing)    Strength (Manual Muscle Testing) bilateral lower extremities  -       Row Name 11/23/24 1300          Strength Comprehensive (MMT)    General Manual Muscle Testing (MMT) Assessment lower extremity strength deficits identified  -TC     Comment, General Manual Muscle Testing (MMT) Assessment L LE 3+/5, R LE 4+/5 MMT sitting  -       Row Name 11/23/24 1300          Bed Mobility    Bed Mobility supine-sit;sit-supine  -TC     All Activities, [place] (Bed Mobility) standby assist  -TC     Supine-Sit [place] (Bed Mobility) standby assist  -TC     Sit-Supine [place] (Bed Mobility) standby assist  -TC     Assistive Device (Bed Mobility) bed rails;head of bed elevated  -       Row Name 11/23/24 1300          Transfers    Transfers sit-stand transfer;stand-sit transfer  -       Row Name 11/23/24 1300          Sit-Stand Transfer    Sit-Stand [place] (Transfers) contact guard  -TC       Row Name 11/23/24 1300          Stand-Sit Transfer    Stand-Sit [place] (Transfers) contact guard  -TC       Row Name 11/23/24 1300          Gait/Stairs (Locomotion)    Gait/Stairs Locomotion gait/ambulation independence  -     [place] Level (Gait) contact guard  -TC      Assistive Device (Gait) other (see comments)  pushed IV pole  -TC     Patient was able to Ambulate yes  -TC     Distance in Feet (Gait) 30  -TC     Pattern (Gait) step-to  -TC     Deviations/Abnormal Patterns (Gait) stride length decreased  -TC     Gait Assessment/Intervention Decreased left hip and knee flexion and extension during gait cycle.  -TC       Row Name 11/23/24 1300          Safety Issues/Impairments Affecting Functional Mobility    Safety Issues Affecting Function (Mobility) ability to follow commands;other (see comments)  required multiple simple verbal requests with frequent need for demonstration or physical guidance to understand command.  -TC       Row Name 11/23/24 1300          Balance    Balance Assessment standing dynamic balance;standing static balance  -TC     Static Standing Balance contact guard  -TC     Dynamic Standing Balance contact guard  -TC     Position/Device Used, Standing Balance supported  -TC       Row Name 11/23/24 1300          Plan of Care Review    Plan of Care Reviewed With patient  -TC     Outcome Evaluation Pt presents with asymmetrical muscle strength in B LE with left LE presenting with increased muscle weakness. Pt also presents with decreased balance and stability requiring external assist during ambulation to decrease fall risk. Pt would benefit from skilled PT services to address these deficits. Pt would benefit from outpatient PT services s/p hospital discharge.  -TC       Row Name 11/23/24 1300          Positioning and Restraints    Pre-Treatment Position in bed  -TC     Post Treatment Position bed  -TC     In Bed supine;call light within reach;encouraged to call for assist  -TC       Row Name 11/23/24 1300          Therapy Assessment/Plan (PT)    Rehab Potential (PT) good  -TC     Criteria for Skilled Interventions Met (PT) yes;meets criteria;skilled treatment is necessary  -TC     Therapy Frequency (PT) daily  -TC     Predicted Duration of Therapy Intervention  (PT) 10 days  -TC     Problem List (PT) problems related to;balance;coordination;mobility;motor control;strength  -TC     Activity Limitations Related to Problem List (PT) unable to ambulate safely;unable to transfer safely  -TC       Row Name 11/23/24 1300          PT Evaluation Complexity    History, PT Evaluation Complexity 1-2 personal factors and/or comorbidities  -TC     Examination of Body Systems (PT Eval Complexity) 1-2 elements  -TC     Clinical Presentation (PT Evaluation Complexity) stable  -TC     Clinical Decision Making (PT Evaluation Complexity) moderate complexity  -TC     Overall Complexity (PT Evaluation Complexity) low complexity  -TC       Row Name 11/23/24 1300          Therapy Plan Review/Discharge Plan (PT)    Therapy Plan Review (PT) evaluation/treatment results reviewed  -TC       Row Name 11/23/24 1300          Physical Therapy Goals    Transfer Goal Selection (PT) transfer, PT goal 1  -TC     Gait Training Goal Selection (PT) gait training, PT goal 1  -TC       Row Name 11/23/24 1300          Transfer Goal 1 (PT)    Activity/Assistive Device (Transfer Goal 1, PT) sit-to-stand/stand-to-sit;bed-to-chair/chair-to-bed  -TC     [place] Level/Cues Needed (Transfer Goal 1, PT) modified independence  -TC     Time Frame (Transfer Goal 1, PT) 10 days  -TC       Row Name 11/23/24 1300          Gait Training Goal 1 (PT)    Activity/Assistive Device (Gait Training Goal 1, PT) gait (walking locomotion)  -TC     [place] Level (Gait Training Goal 1, PT) independent  -TC     Distance (Gait Training Goal 1, PT) 150  -TC     Time Frame (Gait Training Goal 1, PT) 10 days  -TC               User Key  (r) = Recorded By, (t) = Taken By, (c) = Cosigned By      Initials Name Provider Type    TC Anusha Storm, PT Physical Therapist                    Physical Therapy Education       Title: PT OT SLP Therapies (Done)       Topic: Physical Therapy (Done)       Point: Mobility training (Done)        Learning Progress Summary            Patient Acceptance, E, VU by  at 11/23/2024 1319                      Point: Home exercise program (Done)       Learning Progress Summary            Patient Acceptance, E, VU by TC at 11/23/2024 1319                      Point: Body mechanics (Done)       Learning Progress Summary            Patient Acceptance, E, VU by TC at 11/23/2024 1319                      Point: Precautions (Done)       Learning Progress Summary            Patient Acceptance, E, VU by  at 11/23/2024 1319                                      User Key       Initials Effective Dates Name Provider Type Discipline     06/18/24 -  Anusha Storm, PT Physical Therapist PT                  PT Recommendation and Plan  Anticipated Discharge Disposition (PT): home with outpatient therapy services  Planned Therapy Interventions (PT): balance training, bed mobility training, gait training, patient/family education, strengthening, transfer training  Therapy Frequency (PT): daily  Plan of Care Reviewed With: patient  Outcome Evaluation: Pt presents with asymmetrical muscle strength in B LE with left LE presenting with increased muscle weakness. Pt also presents with decreased balance and stability requiring external assist during ambulation to decrease fall risk. Pt would benefit from skilled PT services to address these deficits. Pt would benefit from outpatient PT services s/p hospital discharge.   Outcome Measures       Row Name 11/23/24 1300             How much help from another person do you currently need...    Turning from your back to your side while in flat bed without using bedrails? 4  -TC      Moving from lying on back to sitting on the side of a flat bed without bedrails? 4  -TC      Moving to and from a bed to a chair (including a wheelchair)? 3  -TC      Standing up from a chair using your arms (e.g., wheelchair, bedside chair)? 3  -TC      Climbing 3-5 steps with a railing? 2  -TC      To walk in  hospital room? 3  -TC      AM-PAC 6 Clicks Score (PT) 19  -TC         Functional Assessment    Outcome Measure Options AM-PAC 6 Clicks Basic Mobility (PT)  -TC                User Key  (r) = Recorded By, (t) = Taken By, (c) = Cosigned By      Initials Name Provider Type    Anusha Gimenez, PT Physical Therapist                     Time Calculation:    PT Charges       Row Name 11/23/24 1320             Time Calculation    PT Received On 11/23/24  -TC      PT Goal Re-Cert Due Date 12/02/24  -TC         Untimed Charges    PT Eval/Re-eval Minutes 25  -TC         Total Minutes    Untimed Charges Total Minutes 25  -TC       Total Minutes 25  -TC                User Key  (r) = Recorded By, (t) = Taken By, (c) = Cosigned By      Initials Name Provider Type    Anusha Gimenez, PT Physical Therapist                  Therapy Charges for Today       Code Description Service Date Service Provider Modifiers Qty    71139935715 HC PT EVAL LOW COMPLEXITY 2 11/23/2024 Anusha Storm, PT GP 1            PT G-Codes  Outcome Measure Options: AM-PAC 6 Clicks Basic Mobility (PT)  AM-PAC 6 Clicks Score (PT): 19  AM-PAC 6 Clicks Score (OT): 18    Anusha Storm PT  11/23/2024

## 2024-11-23 NOTE — PROGRESS NOTES
[facility]   Hospitalist Progress Note  Date: 2024  Patient Name: Lauren Marie  : 1960  MRN: [id number]  Date of admission: 2024  Room/Bed:       Subjective   Subjective     Chief Complaint:   Chief Complaint   Patient presents with    Facial Droop       Summary: Lauren Marie is a 64-year-old female with a past medical history of hypertension, and hyperlipidemia and is presenting with a chief complaint of left hand weakness and left facial droop.  Symptoms per chart review and other specialist documentation may have been present for up to 2 weeks prior to admission.  She was seen by her primary care physician for this, encouraged to go to the emergency department for stroke evaluation.  On arrival here CT head noncontrast demonstrates 2 subacute to acute right frontal lobe infarcts.  She was seen by neurologist and recommendation was made for admission for further stroke workup including MRI, CTA head and neck and transthoracic echocardiogram due to embolic nature.    Interval Followup: MRI this morning confirms multiple areas of subacute to acute infarcts noted in right MCA territory, edema without significant mass effect, trace hyperintensity and subarachnoid along frontal parietal concerning for small amount of hemorrhage.  CTA with multiple normal findings including RCA origin occlusion, multiple areas of stenosis including distal left M1 middle cerebral artery moderate to severe stenosis and diffuse luminal irregularities of multiple intracranial and cervical arteries, several aneurysms at bilateral MCA trifurcation's and proximal left posterior cerebral.  I discussed these findings with the patient, as well as with neurologist and neurosurgeon on-call.  TTE remains pending.    Review of Systems    All systems reviewed and negative except for what is outlined above.      Objective   Objective     Vitals:   Temp:  [97.7 °F (36.5 °C)-98.2 °F (36.8 °C)] 97.7 °F (36.5  °C)  Heart Rate:  [65-88] 65  Resp:  [16] 16  BP: (111-158)/() 124/89    Physical Exam   General: Awake, alert, in no acute distress  HENT: Atraumatic, normocephalic.   Eyes: pupils equal, round, without scleral icterus  Cardiovascular: Regular rate and rhythm, no murmurs   Pulmonary: CTA bilaterally; no wheezes; no conversational dyspnea  Gastrointestinal: Soft nontender nondistended  Musculoskeletal: No gross deformities, or ankle edema  Skin: No jaundice, no rash on exposed skin appreciated  Neuro: speech clear; no tremor.  Left-sided weakness.      Result Review    Result Review:  I have personally reviewed these results:  [x]  Laboratory      Lab 11/22/24  1736   WBC 9.11   HEMOGLOBIN 15.7   HEMATOCRIT 45.3   PLATELETS 355   NEUTROS ABS 4.66   IMMATURE GRANS (ABS) 0.02   LYMPHS ABS 3.62*   MONOS ABS 0.56   EOS ABS 0.19   MCV 92.3         Lab 11/22/24  1736   SODIUM 138   POTASSIUM 3.3*   CHLORIDE 100   CO2 27.7   ANION GAP 10.3   BUN 12   CREATININE 0.65   EGFR 98.5   GLUCOSE 101*   CALCIUM 10.2   MAGNESIUM 2.0         Lab 11/22/24  1736   TOTAL PROTEIN 7.9   ALBUMIN 4.5   GLOBULIN 3.4   ALT (SGPT) 21   AST (SGOT) 18   BILIRUBIN 0.4   ALK PHOS 77         Lab 11/22/24  1736   HSTROP T <6         Lab 11/23/24  0418   CHOLESTEROL 188   LDL CHOL 130*   HDL CHOL 38*   TRIGLYCERIDES 112             Brief Urine Lab Results  (Last result in the past 365 days)        Color   Clarity   Blood   Leuk Est   Nitrite   Protein   CREAT   Urine HCG        11/23/24 0052 Yellow   Clear   Negative   Trace   Negative   Negative                 [x]  Microbiology   Microbiology Results (last 10 days)       ** No results found for the last 240 hours. **          [x]  Radiology  CT Head Without Contrast    Result Date: 11/22/2024  Impression: Suspected acute to subacute infarct within the right frontal lobe. Recommend MRI to further evaluate. Findings of acute to subacute right frontal infarcts discussed with Dr. DAVID VIEYRA by  Dr. Jonathan Dozier via telephone on 11/22/2024 7:00 PM EST. Electronically Signed: Jonathan Dozier MD  11/22/2024 7:03 PM EST  Workstation ID: [id number]    XR Chest 1 View    Result Date: 11/22/2024  Impression: No radiographic evidence of acute cardiopulmonary process. Electronically Signed: Jonathan Dozier MD  11/22/2024 6:06 PM EST  Workstation ID: [id number]   []  EKG/Telemetry   []  Cardiology/Vascular   []  Pathology  []  Old records  []  Other:    Assessment & Plan   Assessment / Plan     Multifocal cerebrovascular accident- CT head reviewed.  CTA head and neck reviewed: Right ICA origin occlusion, left MCA M2 stenosis, multiple possible aneurysms, multiple luminal irregularities noted CTA, MRI noted confirming acute to subacute infarcts left frontal lobe as well as questionable subarachnoid hemorrhage.  I discussed the findings with neurosurgeon and neurologist.  Neurosurgeon has been consulted, discussed with Dr. Whitley and he recommending outpatient follow-up for aneurysms and ICA occlusion.  I will also check inflammatory markers.  PT recommending home with home health.  Continue risk factor modification, I counseled patient on smoking cessation.  Waiting for TTE. continue neurochecks, aspirin, Plavix, atorvastatin.       Hypertension-continue home dose hydrochlorothiazide, lisinopril    Chronic pain-continue home dose Percocet, Robaxin         Discussed with RN.    VTE Prophylaxis:  Mechanical VTE prophylaxis orders are present.        CODE STATUS:   Code Status (Patient has no pulse and is not breathing): CPR (Attempt to Resuscitate)  Medical Interventions (Patient has pulse or is breathing): Full Support      Electronically signed by Andrea Vega MD, 11/23/2024, 08:07 EST.

## 2024-11-23 NOTE — THERAPY EVALUATION
Acute Care - Speech Language Pathology   Swallow Initial Evaluation [facility]     Patient Name: Lauren Marie  : 1960  MRN: [id number]  Today's Date: 2024               Admit Date: 2024    Visit Dx:     ICD-10-CM ICD-9-CM   1. Cerebrovascular accident (CVA), unspecified mechanism  I63.9 434.91   2. Impaired mobility and ADLs  Z74.09 V49.89    Z78.9    3. Dysphagia, unspecified type  R13.10 787.20     Patient Active Problem List   Diagnosis    Cigarette nicotine dependence without complication    Transient ischemic attack    Chronic back pain    Vertigo    Primary hypertension    CVA (cerebral vascular accident)     Past Medical History:   Diagnosis Date    Hypertension     Injury of back     Stroke     TIA (transient ischemic attack)     Vertigo      Past Surgical History:   Procedure Laterality Date     SECTION      THYROID SURGERY      US GUIDED FINE NEEDLE ASPIRATION  2020       Inpatient Speech Pathology Dysphagia Evaluation        PAIN SCALE: None indicated.    PRECAUTIONS/CONTRAINDICATIONS: Standard    SUSPECTED ABUSE/NEGLECT/EXPLOITATION: None indicated.    SOCIAL/PSYCHOLOGICAL NEEDS/BARRIERS: None indicated.    PAST SOCIAL HISTORY: 64-year-old female lives at home    PRIOR FUNCTION: Independent    PATIENT GOALS/EXPECTATIONS: Return home    HISTORY: 64-year-old female the above diagnosis is referred for speech therapy evaluation due to possible stroke.  No previous speech pathology services are reported.  Patient states history of mild hoarseness following thyroid surgery.  Patient states no complaint of difficulty speaking or swallowing.    CURRENT DIET LEVEL: Regular, thin    OBJECTIVE:    TEST ADMINISTERED: Clinical dysphagia evaluation    COGNITION/SAFETY AWARENESS:  Patient followed directions and answered questions without difficulty.  Patient is noted with left inattention, requiring cues to look to the left.    BEHAVIORAL OBSERVATIONS: Alert and cooperative    ORAL  MOTOR EXAM: Decreased facial strength to the left 4/5.  Mild lingual deviation on protrusion.  Patient demonstrating adequate intelligibility in connected speech.    VOICE QUALITY: Mild hoarseness    REFLEX EXAM: Deferred    POSTURE: Sitting upright at side of bed    FEEDING/SWALLOWING FUNCTION: Assessed with thin liquids, puréed solids, crunchy solid.    CLINICAL OBSERVATIONS:  Thin liquid by cup and by straw appeared timely with vocal quality remaining clear to cervical auscultation.  Purée solid with swallow completed with laryngeal elevation noted to palpation.  Crunchy solid with adequate chewing followed by swallow completed, utilizing lingual sweep clearing the oral cavity.    DYSPHAGIA CRITERIA: Swallow appears functional for nutritional needs.  No overt clinical signs or symptoms of aspiration were noted at the bedside.  Note silent aspiration cannot be ruled out at the bedside.    FUNCTIONAL ASSESSMENT INSTRUMENT: Patient currently scored a level 7 of 7 on Functional Communication Measures for swallowing indicating a 0% limitation in function.    ASSESSMENT/ PLAN OF CARE:  No direct speech therapy is recommended at this time for dysphagia. Recommend rereferral should patient demonstrate change in status.    RECOMMENDATIONS:   1.   DIET: Regular solids, thin liquid.    2.  POSITION: Positioning fully upright for all p.o. intake and 30 minutes following.    3.  COMPENSATORY STRATEGIES: Alternate small bites and small sips of solids and liquids at a slow rate.  Patient may require some assistance for set up.    Pt/responsible party agrees with plan of care and has been informed of all alternatives, risks and benefits.                            Anticipated Discharge Disposition (SLP): skilled nursing facility (11/23/24 2573)                                                               EDUCATION  The patient has been educated in the following areas:   Modified Diet Instruction.                Time  Calculation:    Time Calculation- SLP       Row Name 11/23/24 1105             Time Calculation- SLP    SLP Start Time 1015  -TB      SLP Stop Time 1100  -TB      SLP Time Calculation (min) 45 min  -TB      SLP Received On 11/23/24  -TB         Untimed Charges    SLP Eval/Re-eval  ST Eval Oral Pharyng Swallow - 80434  -TB      [id number]-JG Eval Oral Pharyng Swallow Minutes 45  -TB         Total Minutes    Untimed Charges Total Minutes 45  -TB       Total Minutes 45  -TB                User Key  (r) = Recorded By, (t) = Taken By, (c) = Cosigned By      Initials Name Provider Type    TB Minda Shetty SLP Speech and Language Pathologist                    Therapy Charges for Today       Code Description Service Date Service Provider Modifiers Qty    [id number]  ST EVAL ORAL PHARYNG SWALLOW 3 11/23/2024 Minda Shetty SLP GN 1                 MARC Vance  11/23/2024

## 2024-11-23 NOTE — PROGRESS NOTES
[facility]  [facility] TeleNeurology Consult Services    Routine Consult Follow-Up    Patient Name:   Lauren Marie  YOB: 1960  Identification Number:   MRN - [id number]  Date of Service:   11/23/2024 08:14:37    Diagnosis        I63.89 - Cerebrovascular accident (CVA) due to other mechanism ([facility])    Impression  63 yo F w/ PMHx of HTN, DM, who presents with 2 weeks of L facial droop and L arm and leg weakness. CTH shows acute to subacute R frontal infarct.    - Recommend aspirin 81 mg daily and Plavix 75 mg daily for at least 21d pending CTA, then aspirin 325 mg daily  - Lipitor 80 mg daily for goal LDL < 70  - Maintain normotension  - Goal A1c < 7.0  - MRI Brain wo Contrast  - CTA head and neck  - TTE  - q4h neurochecks, vitals, telemetry  - PT/OT/SLP evaluation  - Neurology will follow    Our recommendations are outlined below    Diagnostic Studies :  MRI head without contrastCTA head and neck with contrastHolter/Loop Recorder as outpatient with cardiology follow up    Antithrombotic Medication :  Initiate dual antiplatelet therapy with Aspirin 81 mg daily and Clopidogrel 75 mg dailyStatins for LDL goal less than 70    Nursing Recommendations :  IV Fluids, avoid dextrose containing fluids, Maintain euglycemiaNeuro checks q4 hrs x 24 hrs and then per shiftHead of bed 30 degreesContinue with Telemetry    Consultations :  Recommend Speech therapy if failed dysphagia screenPhysical therapy/Occupational therapy    DVT Prophylaxis :  Lovenox or LMW Heparin    Disposition :  Neurology will follow    Subjective  Continues to have left sided weakness    Hospital Course  63 yo F w/ PMHx of HTN, DM, who presents with 2 weeks of L facial droop and L arm and leg weakness.    Imaging  HCT: R Frontal Infarct  TTE P    Labs    A1c P       Examination  BP(147/106), Pulse(75), Temp(97.9), Resp(16),    Neuro Exam:  General: Alert,Awake, Oriented to Time, Place, Person    Speech:  Fluent:    Language: Intact:    Face: Facial Droop: Left    Facial Sensation: Intact:    Visual Fields: Intact:    Extraocular Movements: Intact:    Motor Exam: Drift: LUE, LLE    Sensation: Reduced: LUE, LLE    Coordination: Dysmetria: LUE, LLE           This consult was conducted in real time using interactive audio and video technology. Patient was informed of the technology being used for this visit and agreed to proceed. Patient located in hospital and provider located at home/office setting.    Telehealth Neurology consultation was provided. I spent 10 minutes providing telehealth care. This includes time spent for face to face visit via telemedicine, review of medical records, imaging studies and discussion of findings with providers, the patient and/or family.      Dr Shanta Chiang      [facility]  For Inpatient follow-up with [facility] physician please call [facility] at [phone]. As we are not an outpatient service for any post hospital discharge needs please contact the hospital for assistance.  If you have any questions for the [facility] physicians or need to reconsult for clinical or diagnostic changes please contact us via [facility] at [phone]

## 2024-11-23 NOTE — H&P
Patient Care Team:  Adali Kim APRN as PCP - General (Family Medicine)    Chief complaint left facial droop, left hand weakness    Subjective     Patient is a 64 y.o. female presents with left facial droop and left hand weakness for at least the past several days however history from the patient given to the neurologist is at the symptoms may have been present for up to 2 weeks.  Symptoms have been persistent.  She had her back and neck stretched by physical therapy and states that the symptoms may have started surrounding that.  She went to her PCP on 1120 was complaining of facial droop at the time.  An MRI was ordered and she was encouraged to go to the emergency department for stroke workup.  She does have multiple comorbidities including hypertension, diabetes and hyperlipidemia.  She does not have previous history of stroke.    Initial CT scan did show suspected acute to subacute infarct in the right frontal lobe.    Review of Systems   Pertinent items are noted in HPI    History  Past Medical History:   Diagnosis Date    Hypertension     Injury of back     Stroke     TIA (transient ischemic attack)     Vertigo      Past Surgical History:   Procedure Laterality Date     SECTION      THYROID SURGERY      US GUIDED FINE NEEDLE ASPIRATION  2020     History reviewed. No pertinent family history.  Social History     Tobacco Use    Smoking status: Former     Current packs/day: 0.00     Average packs/day: 0.5 packs/day for 44.9 years (22.4 ttl pk-yrs)     Types: Cigarettes     Start date:      Quit date: 2024     Years since quittin.0     Passive exposure: Current    Smokeless tobacco: Never    Tobacco comments:     Quit 2 weeks ago.   Vaping Use    Vaping status: Never Used   Substance Use Topics    Alcohol use: Never    Drug use: Never     Medications Prior to Admission   Medication Sig Dispense Refill Last Dose/Taking    albuterol sulfate  (90 Base) MCG/ACT inhaler  Inhale 2 puffs Every 4 (Four) Hours As Needed for Wheezing. 8 g 0 Taking As Needed    aspirin 81 MG EC tablet Take 1 tablet by mouth Daily. 30 tablet 5 11/22/2024    Diclofenac Sodium (Voltaren Arthritis Pain) 1 % gel gel Apply 4 g topically to the appropriate area as directed 3 (Three) Times a Day. 150 g 0 Taking    hydroCHLOROthiazide 12.5 MG tablet Take 1 tablet by mouth Daily. 30 tablet 0 11/22/2024    lidocaine (LIDODERM) 5 % Place 1 patch on the skin as directed by provider Daily. Remove & Discard patch within 12 hours or as directed by MD 14 each 0 Taking    lisinopril (PRINIVIL,ZESTRIL) 40 MG tablet Take 1 tablet by mouth Daily. 90 tablet 1 11/22/2024    meclizine (ANTIVERT) 25 MG tablet Take 1 tablet by mouth 3 (Three) Times a Day As Needed for Dizziness. 90 tablet 0 Taking As Needed    methocarbamol (ROBAXIN) 750 MG tablet Take 1 tablet by mouth 4 (Four) Times a Day As Needed for Muscle Spasms. 52 tablet 0 Taking As Needed    potassium chloride 10 MEQ CR tablet Take 1 tablet by mouth Daily.   11/22/2024     Allergies:  Patient has no known allergies.    Objective     Vital Signs  Temp:  [98.2 °F (36.8 °C)] 98.2 °F (36.8 °C)  Heart Rate:  [84] 84  Resp:  [16] 16  BP: (158)/(114) 158/114    Physical Exam:      General Appearance:  Alert, cooperative, in no acute distress   Head:  Normocephalic, without obvious abnormality, atraumatic   Eyes:  Lids and lashes normal, conjunctivae and sclerae normal, no icterus, no pallor, corneas clear, PERRLA   Ears:  Ears appear intact with no abnormalities noted   Throat:  No oral lesions, no thrush, oral mucosa moist   Neck:  No adenopathy, supple, trachea midline, no thyromegaly, no carotid bruit, no JVD   Back:  No kyphosis present, no scoliosis present, no skin lesions, erythema or scars, no tenderness to percussion, or palpation, range of motion normal   Lungs:  Clear to auscultation,respirations regular, even and unlabored    Heart:  Regular rhythm and normal rate,  normal S1 and S2, no murmur, no gallop, no rub, no click   Breast Exam:  Deferred   Abdomen:  Normal bowel sounds, no masses, no organomegaly, soft non-tender, non-distended, no guarding, no rebound tenderness   Genitalia:  Deferred   Extremities:  Moves all extremities well, no edema, no cyanosis, no redness   Pulses:  Pulses palpable and equal bilaterally   Skin:  No bleeding, bruising or rash   Lymph nodes:  No palpable adenopathy   Neurologic:  Cranial nerves 2 - 12 grossly intact, sensation intact, DTR present and equal bilaterally       Results Review:    I reviewed the patient's new clinical results.  I reviewed the patient's new imaging results and agree with the interpretation.  I reviewed the patient's other test results and agree with the interpretation  I personally viewed and interpreted the patient's EKG/Telemetry data    Assessment & Plan       CVA (cerebral vascular accident)    Cigarette nicotine dependence without complication    Chronic back pain    Primary hypertension      Admit to PCU under hospitalist service  Neurochecks  Aspirin, Plavix and statin  MRI head without contrast  PT OT speech  SCDs  ISS  Maintain euglycemia and avoid hyperthermia  Supportive care  IV fluids  Patient passed swallow study therefore placed on diet  As needed Percocet for back pain  Restart home medications once list is profiled  Full code      Víctor Zepeda MD  11/22/24  19:50 EST

## 2024-11-23 NOTE — THERAPY EVALUATION
Patient Name: Lauren Marie  : 1960    MRN: [id number]                              Today's Date: 2024       Admit Date: 2024    Visit Dx:     ICD-10-CM ICD-9-CM   1. Cerebrovascular accident (CVA), unspecified mechanism  I63.9 434.91   2. Impaired mobility and ADLs  Z74.09 V49.89    Z78.9      Patient Active Problem List   Diagnosis    Cigarette nicotine dependence without complication    Transient ischemic attack    Chronic back pain    Vertigo    Primary hypertension    CVA (cerebral vascular accident)     Past Medical History:   Diagnosis Date    Hypertension     Injury of back     Stroke     TIA (transient ischemic attack)     Vertigo      Past Surgical History:   Procedure Laterality Date     SECTION      THYROID SURGERY      US GUIDED FINE NEEDLE ASPIRATION  2020      General Information       Row Name 24 1008          OT Time and Intention    Document Type evaluation  -AC     Mode of Treatment individual therapy;occupational therapy  -AC     Patient Effort good  -       Row Name 24 1008          General Information    Patient Profile Reviewed yes  -AC     Prior Level of Function --  patient reported she requires assist from her  with adls at times. She states (I) with functional mobility and does not have DME.  -AC     Existing Precautions/Restrictions fall  -AC     Barriers to Rehab none identified  -AC       Row Name 24 1008          Occupational Profile    Reason for Services/Referral (Occupational Profile) Pt. is a 64 year old female admitted for the above diagnosis. Pt. referred to OT services to assess independence with ADLs and adl transfers/fx'l mobility. No previous OT services for current condition.  -AC       Row Name 24 1008          Living Environment    People in Home spouse  -AC       Row Name 24 1008          Cognition    Orientation Status (Cognition) oriented x 3  -AC       Row Name 24 1008          Safety  Issues/Impairments Affecting Functional Mobility    Impairments Affecting Function (Mobility) balance;endurance/activity tolerance;strength;coordination  -               User Key  (r) = Recorded By, (t) = Taken By, (c) = Cosigned By      Initials Name Provider Type    Crystal Roque OT Occupational Therapist                     Mobility/ADL's       Row Name 11/23/24 1010          Bed Mobility    Bed Mobility bed mobility (all) activities  -     All Activities, [place] (Bed Mobility) contact guard  -       Row Name 11/23/24 1010          Transfers    Transfers sit-stand transfer  -       Row Name 11/23/24 1010          Sit-Stand Transfer    Sit-Stand [place] (Transfers) contact guard  -       Row Name 11/23/24 1010          Functional Mobility    Functional Mobility- Ind. Level contact guard assist;1 person  -       Row Name 11/23/24 1010          Activities of Daily Living    BADL Assessment/Intervention --  Patient is standby assist for self-feeding, min assist for grooming, min assist for upper body bathing/dressing, contact-guard/min assist for lower body bathing/dressing, contact-guard/min assist for toileting.  -               User Key  (r) = Recorded By, (t) = Taken By, (c) = Cosigned By      Initials Name Provider Type    Crystal Roque OT Occupational Therapist                   Obj/Interventions       Row Name 11/23/24 1011          Sensory Assessment (Somatosensory)    Sensory Assessment (Somatosensory) UE sensation intact  -       Row Name 11/23/24 1011          Vision Assessment/Intervention    Visual Impairment/Limitations WFL  -       Row Name 11/23/24 1011          Range of Motion Comprehensive    Comment, General Range of Motion RUE wnl , LUE wfl with AAROM  -       Row Name 11/23/24 1011          Strength Comprehensive (MMT)    Comment, General Manual Muscle Testing (MMT) Assessment RUE 5/5, LUE 4/5  -       Row Name 11/23/24 1011          Motor Skills    Motor  Skills coordination;functional endurance  -AC     Coordination left;upper extremity;gross motor deficit;fine motor deficit;moderate impairment;finger to nose  -AC     Functional Endurance fair -/fair  -AC       Row Name 11/23/24 1011          Balance    Balance Assessment standing dynamic balance  -AC     Dynamic Standing Balance contact guard  -AC     Position/Device Used, Standing Balance supported  -AC               User Key  (r) = Recorded By, (t) = Taken By, (c) = Cosigned By      Initials Name Provider Type    AC Crystal Marquez OT Occupational Therapist                   Goals/Plan       Row Name 11/23/24 1013          Bed Mobility Goal 1 (OT)    Activity/Assistive Device (Bed Mobility Goal 1, OT) bed mobility activities, all  -AC     [place] Level/Cues Needed (Bed Mobility Goal 1, OT) modified independence  -AC     Time Frame (Bed Mobility Goal 1, OT) long term goal (LTG);10 days  -       Row Name 11/23/24 1013          Transfer Goal 1 (OT)    Activity/Assistive Device (Transfer Goal 1, OT) transfers, all  -AC     [place] Level/Cues Needed (Transfer Goal 1, OT) modified independence  -AC     Time Frame (Transfer Goal 1, OT) long term goal (LTG);10 days  -AC       Row Name 11/23/24 1013          Bathing Goal 1 (OT)    Activity/Device (Bathing Goal 1, OT) bathing skills, all  -AC     [place] Level/Cues Needed (Bathing Goal 1, OT) modified independence  -AC     Time Frame (Bathing Goal 1, OT) long term goal (LTG);10 days  -       Row Name 11/23/24 1013          Dressing Goal 1 (OT)    Activity/Device (Dressing Goal 1, OT) dressing skills, all  -AC     [place]/Cues Needed (Dressing Goal 1, OT) modified independence  -AC     Time Frame (Dressing Goal 1, OT) long term goal (LTG);10 days  -AC       Row Name 11/23/24 1013          Toileting Goal 1 (OT)    Activity/Device (Toileting Goal 1, OT) toileting skills, all  -AC     [place] Level/Cues Needed (Toileting Goal 1, OT) modified  independence  -AC     Time Frame (Toileting Goal 1, OT) long term goal (LTG);10 days  -AC       Row Name 11/23/24 1013          Grooming Goal 1 (OT)    Activity/Device (Grooming Goal 1, OT) grooming skills, all  -AC     [place] (Grooming Goal 1, OT) modified independence  -AC     Time Frame (Grooming Goal 1, OT) long term goal (LTG);10 days  -AC       Row Name 11/23/24 1013          Self-Feeding Goal 1 (OT)    Activity/Device (Self-Feeding Goal 1, OT) self-feeding skills, all  -AC     [place] Level/Cues Needed (Self-Feeding Goal 1, OT) modified independence  -AC     Time Frame (Self-Feeding Goal 1, OT) long term goal (LTG);10 days  -AC       Row Name 11/23/24 1013          Strength Goal 1 (OT)    Strength Goal 1 (OT) patient will improve LUE strenght to 5/5 for adls.  -AC     Time Frame (Strength Goal 1, OT) long term goal (LTG);10 days  -AC       Row Name 11/23/24 1013          Problem Specific Goal 1 (OT)    Problem Specific Goal 1 (OT) patient will improve LUE coordination to good while demonstrating (I) with adls.  -AC     Time Frame (Problem Specific Goal 1, OT) long term goal (LTG);10 days  -AC       Row Name 11/23/24 1013          Therapy Assessment/Plan (OT)    Planned Therapy Interventions (OT) activity tolerance training;functional balance retraining;occupation/activity based interventions;ROM/therapeutic exercise;transfer/mobility retraining;neuromuscular control/coordination retraining;patient/caregiver education/training;BADL retraining  -               User Key  (r) = Recorded By, (t) = Taken By, (c) = Cosigned By      Initials Name Provider Type    AC Crystal Marquez OT Occupational Therapist                   Clinical Impression       Row Name 11/23/24 1012          Pain Assessment    Pretreatment Pain Rating 0/10 - no pain  -AC     Posttreatment Pain Rating 0/10 - no pain  -AC       Row Name 11/23/24 1012          Plan of Care Review    Plan of Care Reviewed With patient  -AC     Progress  no change  -     Outcome Evaluation Patient presents with balance, endurance, left upper extremity strength and coordination limitations that impede his/her ability to perform ADLS. The skills of a therapist are necessary to maximize independence with ADLs.  -       Row Name 11/23/24 1012          Therapy Assessment/Plan (OT)    Patient/Family Therapy Goal Statement (OT) Patient would like to maximize independence with adls.  -     Rehab Potential (OT) good  -     Criteria for Skilled Therapeutic Interventions Met (OT) yes;meets criteria;skilled treatment is necessary  -     Therapy Frequency (OT) 5 times/wk  -       Row Name 11/23/24 1012          Therapy Plan Review/Discharge Plan (OT)    Equipment Needs Upon Discharge (OT) walker, rolling;commode chair  -     Anticipated Discharge Disposition (OT) inpatient rehabilitation facility  -       Row Name 11/23/24 1012          Positioning and Restraints    Pre-Treatment Position in bed  -AC     Post Treatment Position bed  -AC     In Bed fowlers;call light within reach;encouraged to call for assist;exit alarm on  -               User Key  (r) = Recorded By, (t) = Taken By, (c) = Cosigned By      Initials Name Provider Type     Crystal Marquez, OT Occupational Therapist                   Outcome Measures       Row Name 11/23/24 1016          How much help from another is currently needed...    Putting on and taking off regular lower body clothing? 3  -AC     Bathing (including washing, rinsing, and drying) 3  -AC     Toileting (which includes using toilet bed pan or urinal) 3  -AC     Putting on and taking off regular upper body clothing 3  -AC     Taking care of personal grooming (such as brushing teeth) 3  -AC     Eating meals 3  -AC     AM-PAC 6 Clicks Score (OT) 18  -       Row Name 11/23/24 1016          Functional Assessment    Outcome Measure Options AM-PAC 6 Clicks Daily Activity (OT);Optimal Instrument  -       Row Name 11/23/24 1016           Optimal Instrument    Optimal Instrument Optimal - 3  -AC     Bending/Stooping 2  -AC     Standing 2  -AC     Reaching 2  -AC     From the list, choose the 3 activities you would most like to be able to do without any difficulty Bending/stooping;Standing;Reaching  -AC     Total Score Optimal - 3 6  -AC               User Key  (r) = Recorded By, (t) = Taken By, (c) = Cosigned By      Initials Name Provider Type     Crystal Marquez OT Occupational Therapist                    Occupational Therapy Education       Title: PT OT SLP Therapies (Done)       Topic: Occupational Therapy (Done)       Point: ADL training (Done)       Description:   Instruct learner(s) on proper safety adaptation and remediation techniques during self care or transfers.   Instruct in proper use of assistive devices.                  Learning Progress Summary            Patient Acceptance, E,TB,D, VU,DU by  at 11/23/2024 1017                      Point: Home exercise program (Done)       Description:   Instruct learner(s) on appropriate technique for monitoring, assisting and/or progressing therapeutic exercises/activities.                  Learning Progress Summary            Patient Acceptance, E,TB,D, VU,DU by  at 11/23/2024 1017                      Point: Precautions (Done)       Description:   Instruct learner(s) on prescribed precautions during self-care and functional transfers.                  Learning Progress Summary            Patient Acceptance, E,TB,D, VU,DU by  at 11/23/2024 1017                      Point: Body mechanics (Done)       Description:   Instruct learner(s) on proper positioning and spine alignment during self-care, functional mobility activities and/or exercises.                  Learning Progress Summary            Patient Acceptance, E,TB,D, VU,DU by  at 11/23/2024 1017                                      User Key       Initials Effective Dates Name Provider Type [facility] 06/16/21 -  Jimmy  LILY Rojas Occupational Therapist OT                  OT Recommendation and Plan  Planned Therapy Interventions (OT): activity tolerance training, functional balance retraining, occupation/activity based interventions, ROM/therapeutic exercise, transfer/mobility retraining, neuromuscular control/coordination retraining, patient/caregiver education/training, BADL retraining  Therapy Frequency (OT): 5 times/wk  Plan of Care Review  Plan of Care Reviewed With: patient  Progress: no change  Outcome Evaluation: Patient presents with balance, endurance, left upper extremity strength and coordination limitations that impede his/her ability to perform ADLS. The skills of a therapist are necessary to maximize independence with ADLs.     Time Calculation:   Evaluation Complexity (OT)  Review Occupational Profile/Medical/Therapy History Complexity: brief/low complexity  Assessment, Occupational Performance/Identification of Deficit Complexity: 1-3 performance deficits  Clinical Decision Making Complexity (OT): problem focused assessment/low complexity  Overall Complexity of Evaluation (OT): low complexity     Time Calculation- OT       Row Name 11/23/24 1018             Time Calculation- OT    OT Received On 11/23/24  -      OT Goal Re-Cert Due Date 12/02/24  -         Untimed Charges    OT Eval/Re-eval Minutes 28  -AC         Total Minutes    Untimed Charges Total Minutes 28  -AC       Total Minutes 28  -AC                User Key  (r) = Recorded By, (t) = Taken By, (c) = Cosigned By      Initials Name Provider Type    AC Crystal Marquez OT Occupational Therapist                  Therapy Charges for Today       Code Description Service Date Service Provider Modifiers Qty    [id number] HC OT EVAL LOW COMPLEXITY 2 11/23/2024 Crystal Marquez OT GO 1                 Crystal Marquez OT  11/23/2024

## 2024-11-23 NOTE — PLAN OF CARE
Goal Outcome Evaluation:  Plan of Care Reviewed With: patient           Outcome Evaluation: Pt presents with asymmetrical muscle strength in B LE with left LE presenting with increased muscle weakness. Pt also presents with decreased balance and stability requiring external assist during ambulation to decrease fall risk. Pt would benefit from skilled PT services to address these deficits. Pt would benefit from outpatient PT services s/p hospital discharge.    Anticipated Discharge Disposition (PT): home with outpatient therapy services

## 2024-11-23 NOTE — PLAN OF CARE
Goal Outcome Evaluation:  Plan of Care Reviewed With: patient        Progress: no change  Outcome Evaluation: Patient presents with balance, endurance, left upper extremity strength and coordination limitations that impede his/her ability to perform ADLS. The skills of a therapist are necessary to maximize independence with ADLs.    Anticipated Discharge Disposition (OT): inpatient rehabilitation facility

## 2024-11-23 NOTE — PLAN OF CARE
Goal Outcome Evaluation:  Plan of Care Reviewed With: patient, spouse        Progress: no change  Outcome Evaluation: Received report from Miky TURNER RN. Educated patient on stroke. Patient has been sleeping most of the night with  at bedside. Had been experiencing back pain but is now resting comfortably. Will continue with plan of care.

## 2024-11-23 NOTE — CONSULTS
[facility]  [facility] TeleNeurology Consult Services    Stat Consult    Patient Name:   Lauren Marie  YOB: 1960  Identification Number:   MRN - [id number]  Date of Service:   11/22/2024 18:05:05    Diagnosis:        I63.89 - Cerebrovascular accident (CVA) due to other mechanism ([facility])    Impression  65 yo F w/ PMHx of HTN, DM, who presents with 2 weeks of L facial droop and L arm and leg weakness. On exam has mild L arm drift with L facial droop. CTH shows acute to subacute R frontal infarct. On CTH it appears cortical and wedge shaped which could point to ESUS (embolic stroke of undetermined source). She is not a candidate for acute reperfusion therapies due to LKW > 24 hours.    Recommend admission for further stroke workup as below.      Recommendations:  Our recommendations are outlined below.    Diagnostic Studies :  MRI head without contrast  CTA head and neck with contrast  TTE given embolic appearance on CTH    Laboratory Studies :  Lipid panel  Hemoglobin A1c    Antithrombotic Medication :  Initiate dual antiplatelet therapy with Aspirin 81 mg daily and Clopidogrel 75 mg daily  Statin for LDL goal < 70    Nursing Recommendations :  IV Fluids, avoid dextrose containing fluids, Maintain euglycemia  Neuro checks q4 hrs x 24 hrs and then per shift  Head of bed 30 degrees  Continue with Telemetry    Consultations :  Recommend Speech therapy if failed dysphagia screen  Physical therapy/Occupational therapy    Disposition :  Neurology will follow        ----------------------------------------------------------------------------------------------------        Metrics:  Dispatch Time: 11/22/2024 18:01:52  Callback Response Time: 11/22/2024 18:05:22    Primary Provider Notified of Diagnostic Impression and Management Plan on: 11/22/2024 19:32:39      CT HEAD:  Reviewed  Personally reviewed and shows hypodensity in the right frontal lobe likely consistent with acute to subacute  infarct.        ----------------------------------------------------------------------------------------------------    Chief Complaint:  left facial droop, left hand weakness    History of Present Illness:  Patient is a 64 year old Female.  Patient presents with left facial droop and left hand weakness. Patient states it has been going on for 2 weeks and started after she had her back and neck stretched by physical therapy. Symptoms have been persistent since they started.    Of note patient went to go see her PCP on 11/20 when she was complaining of the facial droop at that time, and an MRI was ordered and per record review she was encouraged to go to the ER.       Past Medical History:       Hypertension       Diabetes Mellitus       Hyperlipidemia       There is no history of Stroke    Medications:    No Anticoagulant use   Antiplatelet use: Yes aspirin 81mg  Reviewed EMR for current medications    Allergies:   Reviewed    Social History:  Smoking: Former    Family History:    There is no family history of premature cerebrovascular disease pertinent to this consultation    ROS :  14 Points Review of Systems was performed and was negative except mentioned in HPI.    Past Surgical History:  There Is No Surgical History Contributory To Today’s Visit       Examination:  BP(158/114), Pulse(84),    Neuro Exam:  General: Alert,Awake, Oriented to Time, Place, Person    Speech: Dysarthric:    Language: Intact:    Face: Facial Droop: Left    Facial Sensation: Intact:    Visual Fields: Intact:    Extraocular Movements: Intact:    Motor Exam: Drift: LUE    Sensation: Reduced: LUE, LLE    Coordination: Intact:    Spoke with : Dr. Bartholomew        This consult was conducted in real time using interactive audio and video technology. Patient was informed of the technology being used for this visit and agreed to proceed. Patient located in hospital and provider located at home/office setting.    Patient is being evaluated for  possible acute neurologic impairment and high probability of imminent or life - threatening deterioration.I spent total of 35 minutes providing care to this patient, including time for face to face visit via telemedicine, review of medical records, imaging studies and discussion of findings with providers, the patient and / or family.      Dr Al Mukherjee      [facility]  For Inpatient follow-up with [facility] physician please call [facility] at [phone]. As we are not an outpatient service for any post hospital discharge needs please contact the hospital for assistance.    If you have any questions for the [facility] physicians or need to reconsult for clinical or diagnostic changes please contact us via [facility] at [phone].

## 2024-11-23 NOTE — PLAN OF CARE
CTA head and Neck Results reviewed:     1.Occlusion of the right internal carotid artery at the origin.   2.Atherosclerosis of the left carotid artery without definite stenosis of greater than 50% by NASCET criteria.   3.Suspected short segment of moderate to severe stenosis of the distal left M1 middle cerebral artery.   4.Findings suggestive of diffuse luminal irregularity of multiple intracranial and cervical arteries. This could be related to atherosclerosis but could also be associated with fibromuscular dysplasia.   5.Findings suspicious for subtle aneurysms at the bilateral MCA trifurcations and proximal left posterior cerebral artery measuring up to approximately 2-4 mm. The presence of multiple aneurysms could indicate underlying vascular or connective tissue   disorder.   6.Findings of possible right frontal infarcts, as before.   7.Advanced emphysema.   8.Dominant left thyroid lesion with history of prior left thyroid biopsy. Correlate clinically.    MRI Brain wo Contrast results reviewed:     R MCA territory infarction, possible small amount of subarachnoid hemorrhage, age indeterminate, not visualized on CT so unlikely to be acute.     Recommend continuing aspirin 81 mg daily and Plavix 75 mg daily.  R ICA completely occluded, no intervention indicated.

## 2024-11-23 NOTE — PLAN OF CARE
Goal Outcome Evaluation: mri positive for cva.  Nih 2 this shift.  Vss.

## 2024-11-24 ENCOUNTER — READMISSION MANAGEMENT (OUTPATIENT)
Dept: CALL CENTER | Facility: HOSPITAL | Age: 64
End: 2024-11-24
Payer: COMMERCIAL

## 2024-11-24 VITALS
SYSTOLIC BLOOD PRESSURE: 150 MMHG | HEART RATE: 85 BPM | OXYGEN SATURATION: 94 % | WEIGHT: 137.35 LBS | RESPIRATION RATE: 16 BRPM | BODY MASS INDEX: 22.07 KG/M2 | TEMPERATURE: 97.9 F | HEIGHT: 66 IN | DIASTOLIC BLOOD PRESSURE: 136 MMHG

## 2024-11-24 PROCEDURE — 25810000003 SODIUM CHLORIDE 0.9 % SOLUTION: Performed by: STUDENT IN AN ORGANIZED HEALTH CARE EDUCATION/TRAINING PROGRAM

## 2024-11-24 PROCEDURE — 97116 GAIT TRAINING THERAPY: CPT

## 2024-11-24 PROCEDURE — 99238 HOSP IP/OBS DSCHRG MGMT 30/<: CPT | Performed by: STUDENT IN AN ORGANIZED HEALTH CARE EDUCATION/TRAINING PROGRAM

## 2024-11-24 PROCEDURE — 99233 SBSQ HOSP IP/OBS HIGH 50: CPT | Performed by: PSYCHIATRY & NEUROLOGY

## 2024-11-24 PROCEDURE — 97530 THERAPEUTIC ACTIVITIES: CPT

## 2024-11-24 RX ORDER — ATORVASTATIN CALCIUM 80 MG/1
80 TABLET, FILM COATED ORAL NIGHTLY
Qty: 30 TABLET | Refills: 0 | Status: SHIPPED | OUTPATIENT
Start: 2024-11-24

## 2024-11-24 RX ORDER — CLOPIDOGREL BISULFATE 75 MG/1
75 TABLET ORAL DAILY
Qty: 30 TABLET | Refills: 0 | Status: SHIPPED | OUTPATIENT
Start: 2024-11-25

## 2024-11-24 RX ADMIN — SODIUM CHLORIDE 100 ML/HR: 9 INJECTION, SOLUTION INTRAVENOUS at 05:37

## 2024-11-24 RX ADMIN — OXYCODONE AND ACETAMINOPHEN 1 TABLET: 5; 325 TABLET ORAL at 05:37

## 2024-11-24 RX ADMIN — LISINOPRIL 40 MG: 20 TABLET ORAL at 08:45

## 2024-11-24 RX ADMIN — HYDROCHLOROTHIAZIDE 12.5 MG: 12.5 TABLET ORAL at 08:45

## 2024-11-24 RX ADMIN — Medication 10 ML: at 08:45

## 2024-11-24 RX ADMIN — DICLOFENAC SODIUM 4 G: 10 GEL TOPICAL at 08:50

## 2024-11-24 RX ADMIN — CLOPIDOGREL BISULFATE 75 MG: 75 TABLET ORAL at 08:45

## 2024-11-24 RX ADMIN — ASPIRIN 81 MG: 81 TABLET, CHEWABLE ORAL at 08:45

## 2024-11-24 NOTE — DISCHARGE SUMMARY
[facility]         HOSPITALIST  DISCHARGE SUMMARY    Patient Name: Lauren Marie  : 1960  MRN: [id number]    Date of Admission: 2024  Date of Discharge: 2024  Primary Care Physician: Adali Kim APRN    Consults       Date and Time Order Name Status Description    2024 12:07 PM Inpatient Neurosurgery Consult      2024  9:16 PM Inpatient Neurology Consult Stroke      2024  7:34 PM Inpatient Hospitalist Consult      2024  5:55 PM Inpatient Neurology Consult Stroke Completed             Active and Resolved Hospital Problems:  Active Hospital Problems    Diagnosis POA    **CVA (cerebral vascular accident) [I63.9] Yes    Chronic back pain [M54.9, G89.29] Yes    Cigarette nicotine dependence without complication [F17.210] Yes    Primary hypertension [I10] Yes      Resolved Hospital Problems   No resolved problems to display.       Hospital Course     Hospital Course:  Lauren Marie is a 64-year-old female with a past medical history of hypertension, and hyperlipidemia.  She presented to the emergency department with a chief complaint of left hand weakness and left facial droop.  Symptoms were present for up to 2 weeks prior to admission.  She was seen by her primary care physician for this, encouraged to go to the emergency department for stroke evaluation.  On arrival here CT head noncontrast demonstrates 2 subacute to acute right frontal lobe infarcts.  She was seen by neurologist and recommendation was made for admission for further stroke workup including MRI, CTA head and neck and transthoracic echocardiogram due to suspected embolic nature.     MRI performed inpatient confirms multiple areas of subacute to acute infarcts noted in right MCA territory, edema without significant mass effect, trace hyperintensity and subarachnoid along frontal parietal areas. CTA was notable for multiple abnormal findings including RCA origin occlusion,  multiple areas of stenosis including distal left M1 middle cerebral artery moderate to severe stenosis and diffuse luminal irregularities of multiple intracranial and cervical arteries, several aneurysms at bilateral MCA trifurcation's and proximal left posterior cerebral.  I discussed these findings with the patient, as well as with neurologist and neurosurgeon on-call who recommended dual antibiotic therapy which was already started, intensity statin, outpatient follow-up.  TTE was performed with no findings suggestive of PFO or intracardiac thrombus.  She has negative inflammatory markers and no evidence of vasculitis.  Telemetry was monitored with no evidence of atrial fibrillation.  She was evaluated by the physical therapist and occupational therapist with recommendation for outpatient physical therapy.  Discussed with /, agencies localized area will not accept her insurance, provide patient with referral to physical therapy.  She is hemodynamically stable, and will be skilled for discharge at this time.  Prescriptions have been given for dual antiplatelet therapy and instructions on their use as well as for statin medication.  She will be referred for physical therapy, and to neurosurgeon and neurologist as well as her primary care physician, all to be seen within 2 weeks of discharge.  She has been recommended to receive outpatient Holter monitor with cardiology.  Plan of care was explained to the patient and all questions answered to her and her family's satisfaction.          Day of Discharge     Vital Signs:  Temp:  [97.2 °F (36.2 °C)-98.1 °F (36.7 °C)] 97.9 °F (36.6 °C)  Heart Rate:  [71-95] 85  Resp:  [16-18] 16  BP: (134-153)/(100-136) 150/136  Physical Exam:     General: Awake, alert, patient supine in hospital bed in no acute distress.  Her  is noted standing at bedside.  Head: Atraumatic, normocephalic.  Eyes: pupils equal, round, without scleral  icterus  Cardiovascular: Regular rate and rhythm, no murmurs   Pulmonary: CTA bilaterally; no wheezes; no conversational dyspnea  Gastrointestinal: Soft nontender nondistended  Musculoskeletal: No gross deformities  Skin: No jaundice, no rash on exposed skin appreciated  Neuro: speech clear; no tremor  Psych: Mood and affect appropriate        Discharge Details        Discharge Medications        New Medications        Instructions Start Date   atorvastatin 80 MG tablet  Commonly known as: LIPITOR   80 mg, Oral, Nightly      clopidogrel 75 MG tablet  Commonly known as: PLAVIX   75 mg, Oral, Daily   Start Date: November 25, 2024            Continue These Medications        Instructions Start Date   albuterol sulfate  (90 Base) MCG/ACT inhaler  Commonly known as: PROVENTIL HFA;VENTOLIN HFA;PROAIR HFA   2 puffs, Inhalation, Every 4 Hours PRN      aspirin 81 MG EC tablet   81 mg, Oral, Daily      Diclofenac Sodium 1 % gel gel  Commonly known as: Voltaren Arthritis Pain   4 g, Topical, 3 Times Daily      hydroCHLOROthiazide 12.5 MG tablet   12.5 mg, Oral, Daily      lidocaine 5 %  Commonly known as: LIDODERM   1 patch, Transdermal, Every 24 Hours, Remove & Discard patch within 12 hours or as directed by MD      lisinopril 40 MG tablet  Commonly known as: PRINIVIL,ZESTRIL   40 mg, Oral, Daily      meclizine 25 MG tablet  Commonly known as: ANTIVERT   25 mg, Oral, 3 Times Daily PRN      methocarbamol 750 MG tablet  Commonly known as: ROBAXIN   750 mg, Oral, 4 Times Daily PRN      potassium chloride 10 MEQ CR tablet   10 mEq, Oral, Daily               No Known Allergies    Discharge Dis[place]:  [place] [facility]    [place]:  Hospital:  Diet Order   Procedures    Diet: Cardiac; Healthy Heart (2-3 Na+); Fluid Consistency: Thin (IDDSI 0)       Discharge Activity:       CODE STATUS:  Code Status and Medical Interventions: CPR (Attempt to Resuscitate); Full Support   Ordered at: 11/22/24 1950     Code Status (Patient  has no pulse and is not breathing):    CPR (Attempt to Resuscitate)     Medical Interventions (Patient has pulse or is breathing):    Full Support         No future appointments.    Additional Instructions for the Follow-ups that You Need to Schedule       Ambulatory Referral to Neurology   As directed      Ambulatory Referral to Physical Therapy for Evaluation & Treatment   As directed      Follow-up needed: Yes        Discharge Follow-up with PCP   As directed       Currently Documented PCP:    Adali Kim APRN    PCP Phone Number:    [phone]     Follow Up Details: Hospitalization follow-up within 2 weeks                Pertinent  and/or Most Recent Results     PROCEDURES:   None    LAB RESULTS:      Lab 11/23/24 0418 11/22/24 1736   WBC  --  9.11   HEMOGLOBIN  --  15.7   HEMATOCRIT  --  45.3   PLATELETS  --  355   NEUTROS ABS  --  4.66   IMMATURE GRANS (ABS)  --  0.02   LYMPHS ABS  --  3.62*   MONOS ABS  --  0.56   EOS ABS  --  0.19   MCV  --  92.3   SED RATE 2  --    CRP <0.30  --          Lab 11/23/24 0418 11/22/24  1736   SODIUM  --  138   POTASSIUM  --  3.3*   CHLORIDE  --  100   CO2  --  27.7   ANION GAP  --  10.3   BUN  --  12   CREATININE  --  0.65   EGFR  --  98.5   GLUCOSE  --  101*   CALCIUM  --  10.2   MAGNESIUM  --  2.0   HEMOGLOBIN A1C 5.20  --          Lab 11/22/24  1736   TOTAL PROTEIN 7.9   ALBUMIN 4.5   GLOBULIN 3.4   ALT (SGPT) 21   AST (SGOT) 18   BILIRUBIN 0.4   ALK PHOS 77         Lab 11/22/24  1736   HSTROP T <6         Lab 11/23/24 0418   CHOLESTEROL 188   LDL CHOL 130*   HDL CHOL 38*   TRIGLYCERIDES 112             Brief Urine Lab Results  (Last result in the past 365 days)        Color   Clarity   Blood   Leuk Est   Nitrite   Protein   CREAT   Urine HCG        11/23/24 0052 Yellow   Clear   Negative   Trace   Negative   Negative                 Microbiology Results (last 10 days)       ** No results found for the last 240 hours. **            MRI Brain Without  Contrast    Result Date: 11/23/2024  Impression: Findings consistent with multiple areas of acute to subacute infarcts noted within the right MCA territory. Trace amount of T1 hyperintensity is noted within the subarachnoid space along the frontal parietal region with associated susceptibility artifact, concerning for small amount of subarachnoid hemorrhage. Loss of flow void noted within the right ICA, new since 2021 and better characterized on concurrently obtained CT angiogram Electronically Signed: Hermes Torrez DO  11/23/2024 12:01 PM EST  Workstation ID: [id number]    CT Angiogram Carotids    Result Date: 11/23/2024  Impression: 1.Occlusion of the right internal carotid artery at the origin. 2.Atherosclerosis of the left carotid artery without definite stenosis of greater than 50% by NASCET criteria. 3.Suspected short segment of moderate to severe stenosis of the distal left M1 middle cerebral artery. 4.Findings suggestive of diffuse luminal irregularity of multiple intracranial and cervical arteries. This could be related to atherosclerosis but could also be associated with fibromuscular dysplasia. 5.Findings suspicious for subtle aneurysms at the bilateral MCA trifurcations and proximal left posterior cerebral artery measuring up to approximately 2-4 mm. The presence of multiple aneurysms could indicate underlying vascular or connective tissue disorder. 6.Findings of possible right frontal infarcts, as before. 7.Advanced emphysema. 8.Dominant left thyroid lesion with history of prior left thyroid biopsy. Correlate clinically. Electronically Signed: Farooq Carranza  11/23/2024 10:48 AM EST  Workstation ID: [id number]    CT Angiogram Head    Result Date: 11/23/2024  Impression: 1.Occlusion of the right internal carotid artery at the origin. 2.Atherosclerosis of the left carotid artery without definite stenosis of greater than 50% by NASCET criteria. 3.Suspected short segment of moderate to severe stenosis of the  distal left M1 middle cerebral artery. 4.Findings suggestive of diffuse luminal irregularity of multiple intracranial and cervical arteries. This could be related to atherosclerosis but could also be associated with fibromuscular dysplasia. 5.Findings suspicious for subtle aneurysms at the bilateral MCA trifurcations and proximal left posterior cerebral artery measuring up to approximately 2-4 mm. The presence of multiple aneurysms could indicate underlying vascular or connective tissue disorder. 6.Findings of possible right frontal infarcts, as before. 7.Advanced emphysema. 8.Dominant left thyroid lesion with history of prior left thyroid biopsy. Correlate clinically. Electronically Signed: Farooq Nett  11/23/2024 10:48 AM EST  Workstation ID: [id number]    CT Head Without Contrast    Result Date: 11/22/2024  Impression: Suspected acute to subacute infarct within the right frontal lobe. Recommend MRI to further evaluate. Findings of acute to subacute right frontal infarcts discussed with Dr. DAVID VIEYRA by Dr. Jonathan Dozier via telephone on 11/22/2024 7:00 PM EST. Electronically Signed: Jonathan Dozier MD  11/22/2024 7:03 PM EST  Workstation ID: [id number]    XR Chest 1 View    Result Date: 11/22/2024  Impression: No radiographic evidence of acute cardiopulmonary process. Electronically Signed: Jonathan Dozier MD  11/22/2024 6:06 PM EST  Workstation ID: [id number]              Results for orders placed during the hospital encounter of 11/22/24    Adult Transthoracic Echo Complete W/ Cont if Necessary Per Protocol (With Agitated Saline)    Interpretation Summary    Left ventricular ejection fraction appears to be 51 - 55%.    Left ventricular diastolic function was normal.    No evidence of significant valvular disease      Labs Pending at Discharge:        Time spent on Discharge including face to face service: Less than 30 minutes    Electronically signed by Andrea Vega MD, 11/24/24, 12:48 PM EST.

## 2024-11-24 NOTE — OUTREACH NOTE
Prep Survey      Flowsheet Row Responses   [facility] patient discharged from? [facility]   Is LACE score < 7 ? Yes   Eligibility [facility]   Date of Admission 11/22/24   Date of Discharge 11/24/24   Discharge Disposition Home or Self Care   Discharge diagnosis CVA (cerebral vascular accident   Does the patient have one of the following disease processes/diagnoses(primary or secondary)? Stroke   Does the patient have Home health ordered? No   Is there a DME ordered? No   Prep survey completed? Yes            THERESA SUN - Registered Nurse

## 2024-11-24 NOTE — PLAN OF CARE
Goal Outcome Evaluation:pt able to walk in [place] with physical therapy.  Pt dc home with home physical therapy and .  Home medications and follow up instructions given.

## 2024-11-24 NOTE — THERAPY TREATMENT NOTE
Acute Care - Physical Therapy Progress Note  [facility]     Patient Name: Lauren Marie  : 1960  MRN: [id number]  Today's Date: 2024      Visit Dx:     ICD-10-CM ICD-9-CM   1. Cerebrovascular accident (CVA), unspecified mechanism  I63.9 434.91   2. Impaired mobility and ADLs  Z74.09 V49.89    Z78.9    3. Dysphagia, unspecified type  R13.10 787.20   4. Difficulty in walking  R26.2 719.7     Patient Active Problem List   Diagnosis    Cigarette nicotine dependence without complication    Transient ischemic attack    Chronic back pain    Vertigo    Primary hypertension    CVA (cerebral vascular accident)     Past Medical History:   Diagnosis Date    Hypertension     Injury of back     Stroke     TIA (transient ischemic attack)     Vertigo      Past Surgical History:   Procedure Laterality Date     SECTION      THYROID SURGERY      US GUIDED FINE NEEDLE ASPIRATION  2020     PT Assessment (Last 12 Hours)       PT Evaluation and Treatment       Row Name 24 09          Physical Therapy Time and Intention    Subjective Information no complaints  -CS     Document Type therapy note (daily note)  -CS     Mode of Treatment individual therapy;physical therapy  -CS     Patient Effort good  -CS     Symptoms Noted During/After Treatment none  -CS       Row Name 24          Pain    Pretreatment Pain Rating 0/10 - no pain  -CS     Posttreatment Pain Rating 0/10 - no pain  -CS       Row Name 24          Bed Mobility    Supine-Sit [place] (Bed Mobility) standby assist  -CS     Assistive Device (Bed Mobility) bed rails;head of bed elevated  -CS       Row Name 24          Sit-Stand Transfer    Sit-Stand Emery (Transfers) verbal cues;contact guard;1 person assist  -CS     Assistive Device (Sit-Stand Transfers) walker, front-wheeled  -CS       Row Name 24          Stand-Sit Transfer    Stand-Sit Emery (Transfers) verbal cues;contact guard;1  person assist  -CS     Assistive Device (Stand-Sit Transfers) walker, front-wheeled  -CS       Row Name 11/24/24 0900          Gait/Stairs (Locomotion)    [place] Level (Gait) verbal cues;contact guard  -CS     Assistive Device (Gait) walker, front-wheeled  -CS     Distance in Feet (Gait) 120  -CS     Pattern (Gait) 4-point;step-through  -CS     Deviations/Abnormal Patterns (Gait) gait speed decreased;stride length decreased  -CS       Row Name 11/24/24 0900          Positioning and Restraints    Pre-Treatment Position in bed  -CS     Post Treatment Position chair  -CS     In Chair sitting;call light within reach;encouraged to call for assist;exit alarm on;with family/caregiver  -CS       Row Name 11/24/24 0900          Progress Summary (PT)    Progress Toward Functional Goals (PT) progress toward functional goals is good  -CS               User Key  (r) = Recorded By, (t) = Taken By, (c) = Cosigned By      Initials Name Provider Type    CS Jeremiah Woods PTA Physical Therapist Assistant                    Physical Therapy Education       Title: PT OT SLP Therapies (Done)       Topic: Physical Therapy (Done)       Point: Mobility training (Done)       Learning Progress Summary            Patient Acceptance, E, VU by  at 11/23/2024 1319                      Point: Home exercise program (Done)       Learning Progress Summary            Patient Acceptance, E, VU by  at 11/23/2024 1319                      Point: Body mechanics (Done)       Learning Progress Summary            Patient Acceptance, E, VU by  at 11/23/2024 1319                      Point: Precautions (Done)       Learning Progress Summary            Patient Acceptance, E, VU by  at 11/23/2024 1319                                      User Key       Initials Effective Dates Name Provider Type Discipline    TC 06/18/24 -  Anusha Storm, PT Physical Therapist PT                  PT Recommendation and Plan     Progress Summary (PT)  Progress  Toward Functional Goals (PT): progress toward functional goals is good   Outcome Measures       Row Name 11/24/24 0900 11/23/24 1300          How much help from another person do you currently need...    Turning from your back to your side while in flat bed without using bedrails? 4  -CS 4  -TC     Moving from lying on back to sitting on the side of a flat bed without bedrails? 4  -CS 4  -TC     Moving to and from a bed to a chair (including a wheelchair)? 3  -CS 3  -TC     Standing up from a chair using your arms (e.g., wheelchair, bedside chair)? 3  -CS 3  -TC     Climbing 3-5 steps with a railing? 2  -CS 2  -TC     To walk in hospital room? 3  -CS 3  -TC     AM-PAC 6 Clicks Score (PT) 19  -CS 19  -TC        Functional Assessment    Outcome Measure Options AM-PAC 6 Clicks Basic Mobility (PT)  -CS AM-PAC 6 Clicks Basic Mobility (PT)  -TC               User Key  (r) = Recorded By, (t) = Taken By, (c) = Cosigned By      Initials Name Provider Type    CS Jeremiah Woods PTA Physical Therapist Assistant    TC Anusha Storm, PT Physical Therapist                     Time Calculation:    PT Charges       Row Name 11/24/24 0921             Time Calculation    Start Time 0811  -CS      PT Received On 11/24/24  -CS         Timed Charges    06149 - Gait Training Minutes  11  -CS      98047 - PT Therapeutic Activity Minutes 19  -CS         Total Minutes    Timed Charges Total Minutes 30  -CS       Total Minutes 30  -CS                User Key  (r) = Recorded By, (t) = Taken By, (c) = Cosigned By      Initials Name Provider Type     Jeremiah Woods PTA Physical Therapist Assistant                  Therapy Charges for Today       Code Description Service Date Service Provider Modifiers Qty    56342808337 HC GAIT TRAINING EA 15 MIN 11/24/2024 Jeremiah Woods PTA GP 1    92918672436 HC PT THERAPEUTIC ACT EA 15 MIN 11/24/2024 Jeremiah Woods PTA GP 1            PT G-Codes  Outcome Measure Options: AM-PAC 6 Clicks  Basic Mobility (PT)  AM-PAC 6 Clicks Score (PT): 19  AM-PAC 6 Clicks Score (OT): 18    Jeremiah Woods, PTA  11/24/2024

## 2024-11-24 NOTE — PROGRESS NOTES
[facility]  [facility] Tele[facility]rology [facility]    Routine Consult Follow-Up    Patient Name:   Lauren Marie  YOB: 1960  Identification Number:   MRN - [id number]  Date of Service:   11/24/2024 08:19:43    Diagnosis        I63.89 - Cerebrovascular accident (CVA) due to other mechanism ([facility])    Impression  63 yo F w/ PMHx of HTN, DM, who presents with 2 weeks of L facial droop and L arm and leg weakness. CTH shows acute to subacute R frontal infarct. MRI Brain wo Contrast confirms R MCA territory infarctions likely 2/2 R ICA occlusion seen on CTA head and neck. She additionally has 50% L ICA stenosis and tandem L M1 stenosis. TTE did not show cardiac source of emboli. No Afib on telemetry. She has stopped smoking 2w ago.    - Recommend aspirin 81 mg daily and Plavix 75 mg daily for 90d, then aspirin 325 mg daily  - Lipitor 80 mg daily for goal LDL < 70  - Maintain normotension  - Goal A1c < 7.0  - q4h neurochecks, vitals, telemetry  - PT/OT recommending outpatient therapy  - No further inpatient neurologic work up planned. Patient should follow up with neurology in 1-3w.    Our recommendations are outlined below    Diagnostic Studies :  Holter/Loop Recorder as outpatient with cardiology follow up    Antithrombotic Medication :  Initiate dual antiplatelet therapy with Aspirin 81 mg daily and Clopidogrel 75 mg dailyStatins for LDL goal less than 70    Nursing Recommendations :  IV Fluids, avoid dextrose containing fluids, Maintain euglycemiaNeuro checks q4 hrs x 24 hrs and then per shiftHead of bed 30 degreesContinue with Telemetry    Consultations :  Recommend Speech therapy if failed dysphagia screenPhysical therapy/Occupational therapySmoking cessation counselling recommended    DVT Prophylaxis :  Lovenox or LMW Heparin    Disposition :  No further recommendationsOutpatient Neurology follow up in 1-3 weeks    Subjective  Patient feels her weakness is improving.    Hospital  Course  65 yo F w/ PMHx of HTN, DM, who presents with 2 weeks of L facial droop and L arm and leg weakness.    Imaging  HCT: R Frontal Infarct  MRI Brain wo Contrast Impression:  Findings consistent with multiple areas of acute to subacute infarcts noted within the right MCA territory.  Trace amount of T1 hyperintensity is noted within the subarachnoid space along the frontal parietal region with associated susceptibility artifact, concerning for small amount of subarachnoid hemorrhage.  Loss of flow void noted within the right ICA, new since 2021 and better characterized on concurrently obtained CT angiogram    CTA H&N Impression:  1.Occlusion of the right internal carotid artery at the origin.  2.Atherosclerosis of the left carotid artery without definite stenosis of greater than 50% by NASCET criteria.  3.Suspected short segment of moderate to severe stenosis of the distal left M1 middle cerebral artery.  4.Findings suggestive of diffuse luminal irregularity of multiple intracranial and cervical arteries. This could be related to atherosclerosis but could also be associated with fibromuscular dysplasia.  5.Findings suspicious for subtle aneurysms at the bilateral MCA trifurcations and proximal left posterior cerebral artery measuring up to approximately 2-4 mm. The presence of multiple aneurysms could indicate underlying vascular or connective tissue  disorder.  6.Findings of possible right frontal infarcts, as before.  7.Advanced emphysema.  8.Dominant left thyroid lesion with history of prior left thyroid biopsy. Correlate clinically. ...    TTE: EF 51-55%    Labs    A1c 5.2       Examination  BP(153/101), Pulse(71), Temp(98.1), Resp(16),    Neuro Exam:  General: Alert,Awake, Oriented to Time, Place, Person    Speech: Fluent:    Language: Intact:    Face: Facial Droop: Left    Facial Sensation: Intact:    Visual Fields: Intact:    Extraocular Movements: Intact:    Motor Exam: Drift: LUE,  LLE    Sensation: Reduced: LUE, LLE    Coordination: Dysmetria: LUE           This consult was conducted in real time using interactive audio and video technology. Patient was informed of the technology being used for this visit and agreed to proceed. Patient located in hospital and provider located at home/office setting.    Telehealth Neurology consultation was provided. I spent 15 minutes providing telehealth care. This includes time spent for face to face visit via telemedicine, review of medical records, imaging studies and discussion of findings with providers, the patient and/or family.      Dr Shanta Chiang      [facility]  For Inpatient follow-up with [facility] physician please call [facility] at [phone]. As we are not an outpatient service for any post hospital discharge needs please contact the hospital for assistance.  If you have any questions for the [facility] physicians or need to reconsult for clinical or diagnostic changes please contact us via [facility] at [phone]

## 2024-11-24 NOTE — DISCHARGE INSTRUCTIONS
- Recommend aspirin 81 mg daily and Plavix 75 mg daily for 90d, then aspirin 325 mg daily  - Lipitor 80 mg daily for goal LDL < 70  - Patient should follow up with neurology in 1-3w.  -Outpatient PT/OT.  Local home health agencies will not take your insurance, will refer you to outpatient physical therapy  -Outpatient cardiology follow-up for Holter monitor evaluation

## 2024-11-25 ENCOUNTER — TRANSITIONAL CARE MANAGEMENT TELEPHONE ENCOUNTER (OUTPATIENT)
Dept: CALL CENTER | Facility: HOSPITAL | Age: 64
End: 2024-11-25
Payer: COMMERCIAL

## 2024-11-25 ENCOUNTER — NURSE TRIAGE (OUTPATIENT)
Dept: CALL CENTER | Facility: HOSPITAL | Age: 64
End: 2024-11-25
Payer: COMMERCIAL

## 2024-11-25 NOTE — TELEPHONE ENCOUNTER
"Missed call, Epic record reviewed, message sent to TCM nurse to return call to [phone].  Reason for Disposition  • Health Information question, no triage required and triager able to answer question    Additional Information  • Negative: [1] Caller is not with the adult (patient) AND [2] reporting urgent symptoms  • Negative: Lab result questions  • Negative: Medication questions  • Negative: Caller can't be reached by phone  • Negative: Caller has already spoken to PCP or another triager  • Negative: RN needs further essential information from caller in order to complete triage  • Negative: Requesting regular office appointment  • Negative: [1] Caller requesting NON-URGENT health information AND [2] PCP's office is the best resource  • Negative: General information question, no triage required and triager able to answer question    Answer Assessment - Initial Assessment Questions  1. REASON FOR CALL or QUESTION: \"What is your reason for calling today?\" or \"How can I best help you?\" or \"What question do you have that I can help answer?\"      Missed call.    Protocols used: Information Only Call - No Triage-ADULT-    "

## 2024-11-25 NOTE — OUTREACH NOTE
Call Center TCM Note      Flowsheet Row Responses   [facility] patient discharged from? [place]   Does the patient have one of the following disease processes/diagnoses(primary or secondary)? Stroke   TCM attempt successful? Yes  [no names listed on verbal release]   Call start time 1520   Call end time 1528   Discharge diagnosis CVA (cerebral vascular accident   Person spoke with today (if not patient) and relationship pt and    Meds reviewed with patient/caregiver? Yes   Is the patient having any side effects they believe may be caused by any medication additions or changes? No   Does the patient have all medications ordered at discharge? Yes   Prescription comments pt taking lipitor and plavix   Is the patient taking all medications as directed (includes completed medication regime)? Yes   Comments Hospital f/u 11/27/24@1115am   Does the patient have an appointment with their PCP within 7-14 days of discharge? Yes   Has home health visited the patient within 72 hours of discharge? N/A   Psychosocial issues? No   Does the patient require any assistance with activities of daily living such as eating, bathing, dressing, walking, etc.? Yes   ADL comments  is assisting with dressing patient at this time   Does the patient have any residual symptoms from stroke/TIA? Yes   Residual symptoms comments left hand weakness and left facial droop present but is improving   Did the patient receive a copy of their discharge instructions? Yes   Nursing interventions Reviewed instructions with patient   What is the patient's perception of their health status since discharge? Same  [residual s/s as noted]   Nursing interventions Nurse provided patient education   Is the patient/caregiver able to teach back the risk factors for a stroke? High blood pressure-goal below 120/80, High Cholesterol, Smoking, History of TIAs  [encouraged to purchase BP cuff for monitoring if possible]   Is the patient/caregiver able to  teach back signs and symptoms related to disease process for when to call PCP? Yes   Is the patient/caregiver able to teach back signs and symptoms related to disease process for when to call 911? Yes   If the patient is a current smoker, are they able to teach back resources for cessation? Not a smoker  [no longer smoking]   Is the patient/caregiver able to teach back the hierarchy of who to call/visit for symptoms/problems? PCP, Specialist, Home health nurse, Urgent Care, ED, 911 Yes   Additional teach back comments Pt needs to f/u with neuro and also P.T. Pt recommended to receive OP cardiac Holter monitor but does not have right now   Is the patient able to teach back FAST for Stroke? B alance: Watch for sudden loss of balance, E yes: Check for vision loss, F ace: Look for an uneven smile, A rm: Check if one arm is weak, S peech: Listen for slurred speech, T olinda: Call 9-1-1 right away  [reviewed]   TCM call completed? Yes   Call end time 1528   Is the patient interested in additional calls from an ambulatory ? Yes  [Pt agreeable to services for coordination of care, disease process understanding, compliance, etc]   What is the reason the patient needs support from an ACM? Disease Education, Care Coordination, Barriers to Care, Medication Adherence, High Risk For ED/Readmission            Brunilda Pacheco RN    11/25/2024, 15:31 EST

## 2024-11-27 ENCOUNTER — OFFICE VISIT (OUTPATIENT)
Dept: FAMILY MEDICINE CLINIC | Facility: CLINIC | Age: 64
End: 2024-11-27
Payer: COMMERCIAL

## 2024-11-27 VITALS
DIASTOLIC BLOOD PRESSURE: 94 MMHG | HEIGHT: 66 IN | OXYGEN SATURATION: 97 % | BODY MASS INDEX: 24.75 KG/M2 | SYSTOLIC BLOOD PRESSURE: 130 MMHG | TEMPERATURE: 98.2 F | WEIGHT: 154 LBS | HEART RATE: 87 BPM

## 2024-11-27 DIAGNOSIS — F17.210 CIGARETTE NICOTINE DEPENDENCE WITHOUT COMPLICATION: ICD-10-CM

## 2024-11-27 DIAGNOSIS — Z09 HOSPITAL DISCHARGE FOLLOW-UP: Primary | ICD-10-CM

## 2024-11-27 DIAGNOSIS — I65.21 OCCLUSION OF RIGHT CAROTID ARTERY: ICD-10-CM

## 2024-11-27 DIAGNOSIS — I65.22 STENOSIS OF LEFT CAROTID ARTERY: ICD-10-CM

## 2024-11-27 DIAGNOSIS — I10 PRIMARY HYPERTENSION: ICD-10-CM

## 2024-11-27 DIAGNOSIS — I63.412 CEREBROVASCULAR ACCIDENT (CVA) DUE TO EMBOLISM OF LEFT MIDDLE CEREBRAL ARTERY: ICD-10-CM

## 2024-11-27 PROCEDURE — 99495 TRANSJ CARE MGMT MOD F2F 14D: CPT

## 2024-11-27 PROCEDURE — 3075F SYST BP GE 130 - 139MM HG: CPT

## 2024-11-27 PROCEDURE — 3080F DIAST BP >= 90 MM HG: CPT

## 2024-11-27 PROCEDURE — 1125F AMNT PAIN NOTED PAIN PRSNT: CPT

## 2024-11-27 NOTE — PROGRESS NOTES
Chief Complaint     Hypertension and Cerebrovascular Accident (Follow up from stroke )    History of Present Illness     Lauren Marie is a 64 y.o. female who presents to [facility] for evaluation of hypertension and CVA.      Lauren did end up going to the ER after last visit and they confirmed she did have a stroke. Her right internal carotid artery is fully occluded and the left has a 50% occlusion. She did show 2 infarcts on the right side of the brain.      History      Past Medical History:   Diagnosis Date    Aneurysm     Hypertension     Injury of back     Stroke     TIA (transient ischemic attack)     Vertigo        Past Surgical History:   Procedure Laterality Date     SECTION      THYROID SURGERY      US GUIDED FINE NEEDLE ASPIRATION  2020       History reviewed. No pertinent family history.     Current Medications        Current Outpatient Medications:     albuterol sulfate  (90 Base) MCG/ACT inhaler, Inhale 2 puffs Every 4 (Four) Hours As Needed for Wheezing., Disp: 8 g, Rfl: 0    aspirin 81 MG EC tablet, Take 1 tablet by mouth Daily., Disp: 30 tablet, Rfl: 5    atorvastatin (LIPITOR) 80 MG tablet, Take 1 tablet by mouth Every Night., Disp: 30 tablet, Rfl: 0    clopidogrel (PLAVIX) 75 MG tablet, Take 1 tablet by mouth Daily., Disp: 30 tablet, Rfl: 0    Diclofenac Sodium (Voltaren Arthritis Pain) 1 % gel gel, Apply 4 g topically to the appropriate area as directed 3 (Three) Times a Day., Disp: 150 g, Rfl: 0    hydroCHLOROthiazide 12.5 MG tablet, Take 1 tablet by mouth Daily., Disp: 30 tablet, Rfl: 0    lidocaine (LIDODERM) 5 %, Place 1 patch on the skin as directed by provider Daily. Remove & Discard patch within 12 hours or as directed by MD, Disp: 14 each, Rfl: 0    lisinopril (PRINIVIL,ZESTRIL) 40 MG tablet, Take 1 tablet by mouth Daily., Disp: 90 tablet, Rfl: 1    meclizine (ANTIVERT) 25 MG tablet, Take 1 tablet by mouth 3 (Three) Times a Day  "As Needed for Dizziness., Disp: 90 tablet, Rfl: 0    methocarbamol (ROBAXIN) 750 MG tablet, Take 1 tablet by mouth 4 (Four) Times a Day As Needed for Muscle Spasms., Disp: 52 tablet, Rfl: 0    potassium chloride 10 MEQ CR tablet, Take 1 tablet by mouth Daily., Disp: , Rfl:      Allergies     No Known Allergies    Social History       Social History     Social History Narrative    Not on file       Immunizations     Immunization:  Immunization History   Administered Date(s) Administered    flucelvax quad pfs =>4 YRS 11/07/2019          Objective     Objective     Vital Signs:   /94 (BP Location: Right arm, Patient Position: Sitting, Cuff Size: Adult)   Pulse 87   Temp 98.2 °F (36.8 °C) (Temporal)   Ht 167.6 cm (66\")   Wt 69.9 kg (154 lb)   SpO2 97%   BMI 24.86 kg/m²       Physical Exam  Constitutional:       Appearance: Normal appearance.   HENT:      Nose: Nose normal.      Mouth/Throat:      Mouth: Mucous membranes are moist.   Cardiovascular:      Rate and Rhythm: Normal rate and regular rhythm.      Pulses: Normal pulses.      Heart sounds: Normal heart sounds.   Pulmonary:      Effort: Pulmonary effort is normal.      Breath sounds: Normal breath sounds.   Skin:     General: Skin is warm and dry.   Neurological:      General: No focal deficit present.      Mental Status: She is alert and oriented to person, place, and time.      Motor: Weakness present.   Psychiatric:         Mood and Affect: Mood normal.         Behavior: Behavior normal.         Results    The following data was reviewed by: LEVI Jordan on 11/27/2024:             Assessment and Plan        Assessment and Plan    Diagnoses and all orders for this visit:    1. Hospital discharge follow-up (Primary)    2. Occlusion of right carotid artery  -     Ambulatory Referral to Vascular Surgery    3. Stenosis of left carotid artery  -     Ambulatory Referral to Vascular Surgery    4. Cigarette nicotine dependence without " complication  Comments:  Stopped smoking due to CVA. Has done well without medication and stopping on her own.    5. Cerebrovascular accident (CVA) due to embolism of left middle cerebral artery  Comments:  MRI scheduled.   Vascular surgeon referral ordered.    6. Primary hypertension  Assessment & Plan:  Hypertension is stable and controlled  Continue current treatment regimen.  Blood pressure will be reassessed in 6 months.                Follow Up        Follow Up   Return in about 4 weeks (around 12/25/2024), or if symptoms worsen or fail to improve.  Patient was given instructions and counseling regarding her condition or for health maintenance advice. Please see specific information pulled into the AVS if appropriate.

## 2024-12-02 ENCOUNTER — TELEPHONE (OUTPATIENT)
Dept: CASE MANAGEMENT | Facility: OTHER | Age: 64
End: 2024-12-02
Payer: COMMERCIAL

## 2024-12-02 NOTE — TELEPHONE ENCOUNTER
Called for case management services due to referral from call center. Left message for call back.       Noted per chart review, she does not have a follow up for you, would you like to see her back for blood pressure re-check?      Noted blood pressure readings in-patient were elevated in most all readings. She is prescribed HCTZ 12.5mg daily and Lisinopril 40 mg daily. Maybe a candidate for our HTN program in office?      Noted per chart review prior to outreach call, hospital readings trended 150's/100's but unable to get current readings from her to date.    Please advise.      TY

## 2024-12-03 ENCOUNTER — CLINICAL SUPPORT (OUTPATIENT)
Dept: FAMILY MEDICINE CLINIC | Facility: CLINIC | Age: 64
End: 2024-12-03
Payer: COMMERCIAL

## 2024-12-03 VITALS — SYSTOLIC BLOOD PRESSURE: 144 MMHG | DIASTOLIC BLOOD PRESSURE: 74 MMHG

## 2024-12-03 DIAGNOSIS — I10 PRIMARY HYPERTENSION: Primary | ICD-10-CM

## 2024-12-03 NOTE — PROGRESS NOTES
After drinking coffee this AM, patient felt her BP had been elevated, was having dizziness.  Felt better now, patient is aware of BP.

## 2024-12-04 ENCOUNTER — TELEPHONE (OUTPATIENT)
Dept: CASE MANAGEMENT | Facility: OTHER | Age: 64
End: 2024-12-04
Payer: COMMERCIAL

## 2024-12-04 ENCOUNTER — TELEPHONE (OUTPATIENT)
Dept: FAMILY MEDICINE CLINIC | Facility: CLINIC | Age: 64
End: 2024-12-04
Payer: COMMERCIAL

## 2024-12-04 DIAGNOSIS — R42 DIZZINESS: ICD-10-CM

## 2024-12-04 NOTE — TELEPHONE ENCOUNTER
Her BP in office was down to 130 systolic and she had just started the hctz. Ill get her in on hypertension program if its elevated at cardiology on the 11th.

## 2024-12-04 NOTE — TELEPHONE ENCOUNTER
Called for CCM outreach to see if she is interested in program. Second outreach for CCM. Noted per previous telephone note to PCP she is improving on her blood pressures, had been to office for a check and she will also see Cardiology per PCP message.   
Yes

## 2024-12-04 NOTE — TELEPHONE ENCOUNTER
Caller: DENI KAY    Relationship: Emergency Contact    Best call back number: [phone]     What was the call regarding: DENI STATES HE WANTS TO KNOW IF THE PATIENTS SPECIAL PARKING PAPERWORK IS READY FOR .

## 2024-12-04 NOTE — TELEPHONE ENCOUNTER
Caller: DENI KAY    Relationship: Emergency Contact    Best call back number: [phone]     Requested Prescriptions:   Requested Prescriptions     Pending Prescriptions Disp Refills    meclizine (ANTIVERT) 25 MG tablet 90 tablet 0     Sig: Take 1 tablet by mouth 3 (Three) Times a Day As Needed for Dizziness.        Pharmacy where request should be sent: [id number] [address] & [place]  [phone] [facility] [phone]      Last office visit with prescribing clinician: 11/27/2024   Last telemedicine visit with prescribing clinician: Visit date not found   Next office visit with prescribing clinician: Visit date not found     Does the patient have less than a 3 day supply:  [x] Yes  [] No    Hardik Franklin Rep   12/04/24 15:40 EST

## 2024-12-05 RX ORDER — MECLIZINE HYDROCHLORIDE 25 MG/1
25 TABLET ORAL 3 TIMES DAILY PRN
Qty: 90 TABLET | Refills: 0 | Status: SHIPPED | OUTPATIENT
Start: 2024-12-05

## 2024-12-05 NOTE — TELEPHONE ENCOUNTER
Patients  is aware application is ready for .  He also states patient is having trouble with her legs cramping.  Adali suggested to stop atorvastatin 80mg for 1 week and call us back to let us know if this helped or not.

## 2024-12-09 ENCOUNTER — PATIENT OUTREACH (OUTPATIENT)
Dept: CASE MANAGEMENT | Facility: OTHER | Age: 64
End: 2024-12-09
Payer: COMMERCIAL

## 2024-12-09 ENCOUNTER — TELEPHONE (OUTPATIENT)
Dept: CASE MANAGEMENT | Facility: OTHER | Age: 64
End: 2024-12-09
Payer: COMMERCIAL

## 2024-12-09 DIAGNOSIS — I63.412 CEREBROVASCULAR ACCIDENT (CVA) DUE TO EMBOLISM OF LEFT MIDDLE CEREBRAL ARTERY: Primary | ICD-10-CM

## 2024-12-09 DIAGNOSIS — I69.392 FACIAL DROOP AS LATE EFFECT OF CEREBROVASCULAR ACCIDENT (CVA): ICD-10-CM

## 2024-12-09 DIAGNOSIS — R53.1 LEFT-SIDED WEAKNESS: ICD-10-CM

## 2024-12-09 NOTE — OUTREACH NOTE
AMBULATORY CASE MANAGEMENT NOTE    Names and Relationships of Patient/Support Persons: Contact: Lauren Marie; Relationship: Self  Contact: [facility]; Relationship: Other -     Patient Outreach    Spoke with patient's daughter and she called her mom on her dad's cell and we did 3 way call on speaker phone. They really are in need of therapy and speech from her CVA> Reviewed and discussed CCM case management program with education and health care goals and her  does not feel, at this time, it is what they are needing but is willing to re-address after she gets her therapy in order. He reports he went to the therapy location next to [facility] in [place] and she will be starting this week with them.     Care Coordination    Outreached to [facility] to be bhargavi they could address her hand issues as well. Per Srinivas they do not offer OT services but he is going to doing her care and can address those needs as well with hand therapy. He reports new orders from PCP would be appreciated as his orders are form the hospitalist and they do not sign off on outpatient services. He also explained the new building behind the hospital is offering speech therapy as well and we may look into this for her.       Will pend orders to PCP.     Tamia GRIFFIN  Ambulatory Case Management    12/9/2024, 13:13 EST

## 2024-12-09 NOTE — TELEPHONE ENCOUNTER
Spoke with Srinivas at [facility] who will be seeing her for therapy as outpatient starting Friday. He needs new orders from PCP as what they received is from hospitalist and they will not sign POC.     Orders pended and she is in need of outpatient speech and will pend those as well. I spoke with Susan in speech therapy outpatient in hospital and she is covered under her insurance for this.     Please review orders and Dx and sign off if in agreement.     TY

## 2024-12-09 NOTE — TELEPHONE ENCOUNTER
Per discussion with her  and daughter and her on 3 way call for CCM outreach, her  had taken her to [facility] for getting her set up in care with them and her first appointment is Friday 12/13/24. MAYBE because he (the hospitalist) wanted some hand type therapy as well, they do not have OT but he does work with hand therapy.

## 2024-12-10 ENCOUNTER — PATIENT OUTREACH (OUTPATIENT)
Dept: CASE MANAGEMENT | Facility: OTHER | Age: 64
End: 2024-12-10
Payer: COMMERCIAL

## 2024-12-10 NOTE — OUTREACH NOTE
AMBULATORY CASE MANAGEMENT NOTE    Names and Relationships of Patient/Support Persons: Contact: DENI KAY; Relationship: Emergency Contact -     Patient Outreach    Spoke with her and  on P.T. companies and updated the telephone note to PCP on this matter. Re-routed conversation and pended orders to PCP.     Education Documentation  No documentation found.        Tamia GRIFFIN  Ambulatory Case Management    12/10/2024, 17:41 EST

## 2024-12-10 NOTE — TELEPHONE ENCOUNTER
Per call to her and her , she reports the PTA people hurt her and the MD in [place] said they maybe stretched too much and stressed a nerve so they do not want to go to them any longer.

## 2024-12-11 ENCOUNTER — OFFICE VISIT (OUTPATIENT)
Dept: CARDIOLOGY | Facility: CLINIC | Age: 64
End: 2024-12-11
Payer: COMMERCIAL

## 2024-12-11 VITALS
HEART RATE: 100 BPM | WEIGHT: 154 LBS | BODY MASS INDEX: 24.75 KG/M2 | DIASTOLIC BLOOD PRESSURE: 115 MMHG | SYSTOLIC BLOOD PRESSURE: 166 MMHG | HEIGHT: 66 IN

## 2024-12-11 DIAGNOSIS — Z86.73 HISTORY OF RIGHT MCA STROKE: ICD-10-CM

## 2024-12-11 DIAGNOSIS — I10 ESSENTIAL HYPERTENSION: Primary | ICD-10-CM

## 2024-12-11 DIAGNOSIS — I10 PRIMARY HYPERTENSION: ICD-10-CM

## 2024-12-11 DIAGNOSIS — E78.2 MIXED HYPERLIPIDEMIA: ICD-10-CM

## 2024-12-11 PROCEDURE — 3077F SYST BP >= 140 MM HG: CPT | Performed by: INTERNAL MEDICINE

## 2024-12-11 PROCEDURE — 3080F DIAST BP >= 90 MM HG: CPT | Performed by: INTERNAL MEDICINE

## 2024-12-11 PROCEDURE — 99204 OFFICE O/P NEW MOD 45 MIN: CPT | Performed by: INTERNAL MEDICINE

## 2024-12-11 RX ORDER — AMLODIPINE BESYLATE 5 MG/1
5 TABLET ORAL DAILY
Qty: 90 TABLET | Refills: 3 | Status: SHIPPED | OUTPATIENT
Start: 2024-12-11

## 2024-12-11 RX ORDER — HYDROCHLOROTHIAZIDE 12.5 MG/1
12.5 TABLET ORAL DAILY
Qty: 30 TABLET | Refills: 0 | Status: SHIPPED | OUTPATIENT
Start: 2024-12-11

## 2024-12-11 NOTE — H&P (VIEW-ONLY)
CARDIOLOGY INITIAL CONSULT       Chief Complaint  Cerebrovascular Accident    Subjective            Lauren Marie presents to [facility] CARDIOLOGY  History of Present Illness  The patient is referred for cardiovascular evaluation due to a recent right MCA stroke with multiple infarcts. She has occluded right ICA. The patient has past medical history of hypertension and hyperlipidemia. She smoked for over 40 years but stopped about 4 weeks ago. She denies family history of premature CAD. Her EKG showed normal sinus rhythm. The 2 DECHO showed normal EF and no significant valvular disease.       Past History:    Medical History:  Past Medical History:   Diagnosis Date    Aneurysm     Hypertension     Injury of back     Stroke     TIA (transient ischemic attack)     Vertigo        Surgical History: has a past surgical history that includes US Guided Fine Needle Aspiration (2020); Thyroid surgery; and  section.     Family History: family history is not on file.     Social History: reports that she quit smoking about 4 weeks ago. Her smoking use included cigarettes. She started smoking about 44 years ago. She has a 22.4 pack-year smoking history. She has been exposed to tobacco smoke. She has never used smokeless tobacco. She reports that she does not drink alcohol and does not use drugs.    Allergies: Patient has no known allergies.    Current Outpatient Medications on File Prior to Visit   Medication Sig    albuterol sulfate  (90 Base) MCG/ACT inhaler Inhale 2 puffs Every 4 (Four) Hours As Needed for Wheezing.    aspirin 81 MG EC tablet Take 1 tablet by mouth Daily.    atorvastatin (LIPITOR) 80 MG tablet Take 1 tablet by mouth Every Night.    clopidogrel (PLAVIX) 75 MG tablet Take 1 tablet by mouth Daily.    Diclofenac Sodium (Voltaren Arthritis Pain) 1 % gel gel Apply 4 g topically to the appropriate area as directed 3 (Three) Times a Day.    hydroCHLOROthiazide 12.5 MG  "tablet Take 1 tablet by mouth Daily.    lisinopril (PRINIVIL,ZESTRIL) 40 MG tablet Take 1 tablet by mouth Daily.    meclizine (ANTIVERT) 25 MG tablet Take 1 tablet by mouth 3 (Three) Times a Day As Needed for Dizziness.    potassium chloride 10 MEQ CR tablet Take 1 tablet by mouth Daily.    lidocaine (LIDODERM) 5 % Place 1 patch on the skin as directed by provider Daily. Remove & Discard patch within 12 hours or as directed by MD (Patient not taking: Reported on 12/11/2024)    methocarbamol (ROBAXIN) 750 MG tablet Take 1 tablet by mouth 4 (Four) Times a Day As Needed for Muscle Spasms. (Patient not taking: Reported on 12/11/2024)     No current facility-administered medications on file prior to visit.          Review of Systems : all systems were reviewed and negative except for slurred speech.     Objective     BP (!) 166/115 (BP Location: Left arm)   Pulse 100   Ht 167.6 cm (66\")   Wt 69.9 kg (154 lb)   BMI 24.86 kg/m²       Physical Exam    Alert, oriented  Neck: no JVD, no bruits  Heart. Regular, no gallops, no rubs, no murmurs.  Lungs: no rales, no wheezing.   Abdomen: soft, bs+  LE : No edema.  Neurologic. No motor deficits.   Result Review :     The following data was reviewed by: Roman Garcia MD on 12/11/2024:    CMP          3/22/2024    09:41 11/22/2024    17:36   CMP   Glucose 92  101    BUN 14  12    Creatinine 0.97  0.65    EGFR 65.8  98.5    Sodium 140  138    Potassium 4.0  3.3    Chloride 103  100    Calcium 10.5  10.2    Total Protein 7.4  7.9    Albumin 4.6  4.5    Globulin 2.8  3.4    Total Bilirubin 0.5  0.4    Alkaline Phosphatase 77  77    AST (SGOT) 13  18    ALT (SGPT) 17  21    Albumin/Globulin Ratio 1.6  1.3    BUN/Creatinine Ratio 14.4  18.5    Anion Gap 12.0  10.3      CBC          11/22/2024    17:36   CBC   WBC 9.11    RBC 4.91    Hemoglobin 15.7    Hematocrit 45.3    MCV 92.3    MCH 32.0    MCHC 34.7    RDW 12.6    Platelets 355        Lipid Panel          11/23/2024    04:18 "   Lipid Panel   Total Cholesterol 188    Triglycerides 112    HDL Cholesterol 38    VLDL Cholesterol 20    LDL Cholesterol  130    LDL/HDL Ratio 3.36         Data reviewed: Cardiology studies    Results for orders placed during the hospital encounter of 11/22/24    Adult Transthoracic Echo Complete W/ Cont if Necessary Per Protocol (With Agitated Saline)    Interpretation Summary    Left ventricular ejection fraction appears to be 51 - 55%.    Left ventricular diastolic function was normal.    No evidence of significant valvular disease                   Assessment and Plan        Diagnoses and all orders for this visit:    1. Essential hypertension (Primary)    2. Mixed hyperlipidemia    3. History of right MCA stroke  -     Holter Monitor - 72 Hour Up To 15 Days; Future  -     Adult Transesophageal Echo (LLOYD) W/ Cont if Necessary Per Protocol; Future    Other orders  -     amLODIPine (NORVASC) 5 MG tablet; Take 1 tablet by mouth Daily.  Dispense: 90 tablet; Refill: 3        I would continue with ace inhibitors and low dose hydrochlorothiazide. I would add Amlodipine 5 mg po at bedtime for better BP control. I would continue with statin therapy and antiplatelets. Will proceed with a cardiac monitor for 15 days for possible PAF. If negative, will consider insertion of a LOOP recorder. The patient will be scheduled for a LLOYD to evaluate for cardiac source of embolization. Risk and benefits of the procedure was discussed in detail and the patient agreed. Continue with lifestyle modification, heart healthy diet. Continue with physical therapy.     I spent 45 minutes caring for Lauren on this date of service. This time includes time spent by me in the following activities:reviewing tests, obtaining and/or reviewing a separately obtained history, performing a medically appropriate examination and/or evaluation , ordering medications, tests, or procedures, and documenting information in the medical record.    Lauren  Radha Marie  reports that she quit smoking about 4 weeks ago. Her smoking use included cigarettes. She started smoking about 44 years ago. She has a 22.4 pack-year smoking history. She has been exposed to tobacco smoke. She has never used smokeless tobacco. I have educated her on the risk of diseases from using tobacco products such as cancer, COPD and heart disease.     I advised her to quit and she is no longer a tobacco user and has quit.    I spend 10 minutes on counseling the patient.    Follow Up     Return for After testing, Jeremias.    Patient was given instructions and counseling regarding her condition or for health maintenance advice. Please see specific information pulled into the AVS if appropriate.

## 2024-12-11 NOTE — PROGRESS NOTES
CARDIOLOGY INITIAL CONSULT       Chief Complaint  Cerebrovascular Accident    Subjective            Lauren Marie presents to [facility] CARDIOLOGY  History of Present Illness  The patient is referred for cardiovascular evaluation due to a recent right MCA stroke with multiple infarcts. She has occluded right ICA. The patient has past medical history of hypertension and hyperlipidemia. She smoked for over 40 years but stopped about 4 weeks ago. She denies family history of premature CAD. Her EKG showed normal sinus rhythm. The 2 DECHO showed normal EF and no significant valvular disease.       Past History:    Medical History:  Past Medical History:   Diagnosis Date    Aneurysm     Hypertension     Injury of back     Stroke     TIA (transient ischemic attack)     Vertigo        Surgical History: has a past surgical history that includes US Guided Fine Needle Aspiration (2020); Thyroid surgery; and  section.     Family History: family history is not on file.     Social History: reports that she quit smoking about 4 weeks ago. Her smoking use included cigarettes. She started smoking about 44 years ago. She has a 22.4 pack-year smoking history. She has been exposed to tobacco smoke. She has never used smokeless tobacco. She reports that she does not drink alcohol and does not use drugs.    Allergies: Patient has no known allergies.    Current Outpatient Medications on File Prior to Visit   Medication Sig    albuterol sulfate  (90 Base) MCG/ACT inhaler Inhale 2 puffs Every 4 (Four) Hours As Needed for Wheezing.    aspirin 81 MG EC tablet Take 1 tablet by mouth Daily.    atorvastatin (LIPITOR) 80 MG tablet Take 1 tablet by mouth Every Night.    clopidogrel (PLAVIX) 75 MG tablet Take 1 tablet by mouth Daily.    Diclofenac Sodium (Voltaren Arthritis Pain) 1 % gel gel Apply 4 g topically to the appropriate area as directed 3 (Three) Times a Day.    hydroCHLOROthiazide 12.5 MG  "tablet Take 1 tablet by mouth Daily.    lisinopril (PRINIVIL,ZESTRIL) 40 MG tablet Take 1 tablet by mouth Daily.    meclizine (ANTIVERT) 25 MG tablet Take 1 tablet by mouth 3 (Three) Times a Day As Needed for Dizziness.    potassium chloride 10 MEQ CR tablet Take 1 tablet by mouth Daily.    lidocaine (LIDODERM) 5 % Place 1 patch on the skin as directed by provider Daily. Remove & Discard patch within 12 hours or as directed by MD (Patient not taking: Reported on 12/11/2024)    methocarbamol (ROBAXIN) 750 MG tablet Take 1 tablet by mouth 4 (Four) Times a Day As Needed for Muscle Spasms. (Patient not taking: Reported on 12/11/2024)     No current facility-administered medications on file prior to visit.          Review of Systems : all systems were reviewed and negative except for slurred speech.     Objective     BP (!) 166/115 (BP Location: Left arm)   Pulse 100   Ht 167.6 cm (66\")   Wt 69.9 kg (154 lb)   BMI 24.86 kg/m²       Physical Exam    Alert, oriented  Neck: no JVD, no bruits  Heart. Regular, no gallops, no rubs, no murmurs.  Lungs: no rales, no wheezing.   Abdomen: soft, bs+  LE : No edema.  Neurologic. No motor deficits.   Result Review :     The following data was reviewed by: Roman Garcia MD on 12/11/2024:    CMP          3/22/2024    09:41 11/22/2024    17:36   CMP   Glucose 92  101    BUN 14  12    Creatinine 0.97  0.65    EGFR 65.8  98.5    Sodium 140  138    Potassium 4.0  3.3    Chloride 103  100    Calcium 10.5  10.2    Total Protein 7.4  7.9    Albumin 4.6  4.5    Globulin 2.8  3.4    Total Bilirubin 0.5  0.4    Alkaline Phosphatase 77  77    AST (SGOT) 13  18    ALT (SGPT) 17  21    Albumin/Globulin Ratio 1.6  1.3    BUN/Creatinine Ratio 14.4  18.5    Anion Gap 12.0  10.3      CBC          11/22/2024    17:36   CBC   WBC 9.11    RBC 4.91    Hemoglobin 15.7    Hematocrit 45.3    MCV 92.3    MCH 32.0    MCHC 34.7    RDW 12.6    Platelets 355        Lipid Panel          11/23/2024    04:18 "   Lipid Panel   Total Cholesterol 188    Triglycerides 112    HDL Cholesterol 38    VLDL Cholesterol 20    LDL Cholesterol  130    LDL/HDL Ratio 3.36         Data reviewed: Cardiology studies    Results for orders placed during the hospital encounter of 11/22/24    Adult Transthoracic Echo Complete W/ Cont if Necessary Per Protocol (With Agitated Saline)    Interpretation Summary    Left ventricular ejection fraction appears to be 51 - 55%.    Left ventricular diastolic function was normal.    No evidence of significant valvular disease                   Assessment and Plan        Diagnoses and all orders for this visit:    1. Essential hypertension (Primary)    2. Mixed hyperlipidemia    3. History of right MCA stroke  -     Holter Monitor - 72 Hour Up To 15 Days; Future  -     Adult Transesophageal Echo (LLOYD) W/ Cont if Necessary Per Protocol; Future    Other orders  -     amLODIPine (NORVASC) 5 MG tablet; Take 1 tablet by mouth Daily.  Dispense: 90 tablet; Refill: 3        I would continue with ace inhibitors and low dose hydrochlorothiazide. I would add Amlodipine 5 mg po at bedtime for better BP control. I would continue with statin therapy and antiplatelets. Will proceed with a cardiac monitor for 15 days for possible PAF. If negative, will consider insertion of a LOOP recorder. The patient will be scheduled for a LLOYD to evaluate for cardiac source of embolization. Risk and benefits of the procedure was discussed in detail and the patient agreed. Continue with lifestyle modification, heart healthy diet. Continue with physical therapy.     I spent 45 minutes caring for Lauren on this date of service. This time includes time spent by me in the following activities:reviewing tests, obtaining and/or reviewing a separately obtained history, performing a medically appropriate examination and/or evaluation , ordering medications, tests, or procedures, and documenting information in the medical record.    Lauren  Radha Marie  reports that she quit smoking about 4 weeks ago. Her smoking use included cigarettes. She started smoking about 44 years ago. She has a 22.4 pack-year smoking history. She has been exposed to tobacco smoke. She has never used smokeless tobacco. I have educated her on the risk of diseases from using tobacco products such as cancer, COPD and heart disease.     I advised her to quit and she is no longer a tobacco user and has quit.    I spend 10 minutes on counseling the patient.    Follow Up     Return for After testing, Jeremias.    Patient was given instructions and counseling regarding her condition or for health maintenance advice. Please see specific information pulled into the AVS if appropriate.

## 2024-12-12 ENCOUNTER — TELEPHONE (OUTPATIENT)
Dept: CARDIOLOGY | Facility: CLINIC | Age: 64
End: 2024-12-12
Payer: COMMERCIAL

## 2024-12-12 NOTE — TELEPHONE ENCOUNTER
I spoke to patient and gave an arrival time of 8:30 on 12/13/24 for LLOYD. Patient was instructed to have a  for the day of the procedure and to arrive at the main entrance/registration area. Patient was instructed to hold all medications on the morning of the procedure. Patient was instructed to be NPO after midnight with only sips of water as needed. Patient is agreeable with no other questions or concerns.

## 2024-12-13 ENCOUNTER — HOSPITAL ENCOUNTER (OUTPATIENT)
Dept: CARDIOLOGY | Facility: HOSPITAL | Age: 64
Discharge: HOME | End: 2024-12-13
Payer: COMMERCIAL

## 2024-12-13 VITALS
BODY MASS INDEX: 24.59 KG/M2 | HEART RATE: 97 BPM | WEIGHT: 153 LBS | OXYGEN SATURATION: 94 % | DIASTOLIC BLOOD PRESSURE: 98 MMHG | RESPIRATION RATE: 16 BRPM | HEIGHT: 66 IN | SYSTOLIC BLOOD PRESSURE: 121 MMHG

## 2024-12-13 DIAGNOSIS — Z86.73 HISTORY OF RIGHT MCA STROKE: ICD-10-CM

## 2024-12-13 PROCEDURE — 25010000002 DIPHENHYDRAMINE PER 50 MG: Performed by: INTERNAL MEDICINE

## 2024-12-13 PROCEDURE — 93325 DOPPLER ECHO COLOR FLOW MAPG: CPT

## 2024-12-13 PROCEDURE — 25010000002 MEPERIDINE PER 100 MG: Performed by: INTERNAL MEDICINE

## 2024-12-13 PROCEDURE — 25010000002 MIDAZOLAM PER 1MG: Performed by: INTERNAL MEDICINE

## 2024-12-13 RX ORDER — MIDAZOLAM HYDROCHLORIDE 2 MG/2ML
INJECTION, SOLUTION INTRAMUSCULAR; INTRAVENOUS
Status: COMPLETED | OUTPATIENT
Start: 2024-12-13 | End: 2024-12-13

## 2024-12-13 RX ORDER — MEPERIDINE HYDROCHLORIDE 25 MG/ML
INJECTION INTRAMUSCULAR; INTRAVENOUS; SUBCUTANEOUS
Status: COMPLETED | OUTPATIENT
Start: 2024-12-13 | End: 2024-12-13

## 2024-12-13 RX ORDER — MEPERIDINE HYDROCHLORIDE 25 MG/ML
INJECTION INTRAMUSCULAR; INTRAVENOUS; SUBCUTANEOUS
Status: DISPENSED
Start: 2024-12-13 | End: 2024-12-13

## 2024-12-13 RX ORDER — DIPHENHYDRAMINE HYDROCHLORIDE 50 MG/ML
INJECTION INTRAMUSCULAR; INTRAVENOUS
Status: DISPENSED
Start: 2024-12-13 | End: 2024-12-13

## 2024-12-13 RX ORDER — MIDAZOLAM HYDROCHLORIDE 2 MG/2ML
INJECTION, SOLUTION INTRAMUSCULAR; INTRAVENOUS
Status: DISPENSED
Start: 2024-12-13 | End: 2024-12-13

## 2024-12-13 RX ORDER — DIPHENHYDRAMINE HYDROCHLORIDE 50 MG/ML
INJECTION INTRAMUSCULAR; INTRAVENOUS
Status: COMPLETED | OUTPATIENT
Start: 2024-12-13 | End: 2024-12-13

## 2024-12-13 RX ADMIN — MIDAZOLAM HYDROCHLORIDE 1 MG: 1 INJECTION, SOLUTION INTRAMUSCULAR; INTRAVENOUS at 09:35

## 2024-12-13 RX ADMIN — DIPHENHYDRAMINE HYDROCHLORIDE 50 MG: 50 INJECTION, SOLUTION INTRAMUSCULAR; INTRAVENOUS at 09:20

## 2024-12-13 RX ADMIN — MIDAZOLAM HYDROCHLORIDE 1 MG: 1 INJECTION, SOLUTION INTRAMUSCULAR; INTRAVENOUS at 09:38

## 2024-12-13 RX ADMIN — MEPERIDINE HYDROCHLORIDE 25 MG: 25 INJECTION INTRAMUSCULAR; INTRAVENOUS; SUBCUTANEOUS at 09:18

## 2024-12-13 RX ADMIN — MIDAZOLAM HYDROCHLORIDE 2 MG: 1 INJECTION, SOLUTION INTRAMUSCULAR; INTRAVENOUS at 09:26

## 2024-12-13 RX ADMIN — TOPICAL ANESTHETIC 1 SPRAY: 200 SPRAY DENTAL; PERIODONTAL at 09:10

## 2024-12-13 RX ADMIN — MEPERIDINE HYDROCHLORIDE 25 MG: 25 INJECTION INTRAMUSCULAR; INTRAVENOUS; SUBCUTANEOUS at 09:29

## 2024-12-13 RX ADMIN — MIDAZOLAM HYDROCHLORIDE 2 MG: 1 INJECTION, SOLUTION INTRAMUSCULAR; INTRAVENOUS at 09:12

## 2024-12-13 RX ADMIN — MIDAZOLAM HYDROCHLORIDE 2 MG: 1 INJECTION, SOLUTION INTRAMUSCULAR; INTRAVENOUS at 09:15

## 2024-12-13 NOTE — DISCHARGE INSTRUCTIONS
Echo Lab Phone Number: [phone]    Do not eat or drink anything until 1110. Begin with sips of cool water before trying hot liquid to be sure throat numbness has worn off.  A responsible adult should drive you home and stay with you today and tonight.  Do not drive a car or operate any hazardous machinery for 24 hours.  Do not drink any alcoholic beverages for 24 hours.  Do not make any legal decisions for 24 hours after sedation.  Do not engage in any strenuous activity.  Resume your medications, following prescribed instructions.  Contact your caregiver if you have questions or concerns about your care.    In the event of an emergency, call 911 or go to your nearest emergency room.    You received the following medications during your procedure: Versed, Demerol and Benadryl for sedation. Hurricaine spray for throat numbing.

## 2024-12-16 ENCOUNTER — HOSPITAL ENCOUNTER (OUTPATIENT)
Facility: HOSPITAL | Age: 64
Setting detail: THERAPIES SERIES
Discharge: HOME | End: 2024-12-16
Payer: COMMERCIAL

## 2024-12-16 DIAGNOSIS — R41.841 COGNITIVE COMMUNICATION DEFICIT: Primary | ICD-10-CM

## 2024-12-16 PROCEDURE — 96125 COGNITIVE TEST BY HC PRO: CPT | Performed by: SPEECH-LANGUAGE PATHOLOGIST

## 2024-12-16 NOTE — THERAPY EVALUATION
Outpatient Speech Language Pathology   Adult Speech Language Cognitive Initial Evaluation   [facility]     Patient Name: Lauren Marie  : 1960  MRN: [id number]  Today's Date: 2024          OBJECTIVE    Visit Date: 2024   Patient Active Problem List   Diagnosis    Cigarette nicotine dependence without complication    Transient ischemic attack    Chronic back pain    Vertigo    Primary hypertension    CVA (cerebral vascular accident)       PMH@  Past Surgical History:   Procedure Laterality Date     SECTION      THYROID SURGERY      US GUIDED FINE NEEDLE ASPIRATION  2020   Primary DX:  right, MCA stroke': facial droop    Visit Dx:   Cognitive Communication Deficit        History  Intake  Physician: Adali Kim  Next Physician Visit: [address]  Hx of Hospitalization Yes [facility] 2024  Function Level Prior to Admission: Normal language and cognitive functioning prior to stroke  Previous Therapy Services: None  Current Home Health Admission: No    HPI  Onset Date: 2024  Age: 64  Gender: Female  History: Mrs. Marie is a delightful 64-year-old female, consulting speech therapy for cognitive communication deficits secondary to a right, MCA stroke.  Patient indicates difficulties with word recall, communicating a complete thought without forgetting what she want to say, and facial droop.  Additionally, patient demonstrating left-sided weakness of the arm and shoulder.  Patient desires to be evaluated and treated for deficits.        precautions: None indicated  Patient's Goals/Expectations: Patient's priority is to be able to use her hand again and talk.      Current Diet Level: Regular solids and thin liquids    Comments: Patient lives in [place], [place] and is .  She has four children in the [employer].    Psychosocial History    Usual Living Arrangement: Lives with   Psychosocial History  (depression/anxiety) No  Nutritional Problems: No    Past Medical History    Pertinent Past Medical History:   Past Medical History:   Diagnosis Date    Aneurysm     Hypertension     Injury of back     Stroke     TIA (transient ischemic attack)     Vertigo           History of Seizures: no      Abuse    Patient being Hurt, Hit, Scared or Neglected?  No  Caregiver Neglect: No      Pain  Pain Intensity: 0  Pain Location: N/A  Pain Scale Used: Numerical  Pain Management Techniques: Patient takes Tylenol for headaches.  She has no headache today.     Orientation    Level of Consciousness: alert and oriented times 3? Alert and orientated times 3        Formal Assessment    Oral Mechanism Examination: Poor dentition, left labial droop rated a 3/5 fair, left lingual strength deficit rated a 4/5.  Patient will benefit from a HEP to increase lingual and labial strength.   Cognitive Linguistic Assessment  Patient was assessed using portions of the standardized tests: Cognitive Linguistic Quick Test (CLQT), Scales of Cognitive Ability for Traumatic Brain Injury (SCATBI), Reading Comprehension for Aphasia, Revised Token Test and other informal assessments.  The results are as follows:      CLQT+ Assessment  Severity Ratings Table    Cognitive Domain      Score  Severity Rating    Attention     [139]            [3-mild]  Memory     [162]            [4-WNL]   Executive Functioning    [14]            [1-severe]  Language     [29]            [4-WNL]  Visuospatial Skills    [62]            [3-mild]  Optional Clock Draw    [4]            [1-severe] (not factored into total score)      Total  [15]  Composite Severity Rating   [3.0 indicating mild deficits}                Summary: Patient's Composite Severity Rating is a 3.0 indicating a mild overall deficit in cognitive functioning.  However, patient is rated severe in executive functioning.  Patient will benefit from a trial of speech therapy to increase visuospatial skills,  attention and executive functioning skills for safe completion of IADLs.                         ST Functional Communication Testing    Patient is rated on the Functional Communication Measures for problem solving  at a level 3/7 with a 60-79% limitation.  With speech therapy, patient is expected to reach a Functional Communication Measure level of 5/7 with a 20-39% problem solving  limitation.        Pt presents with limitations, noted below, that impede her ability to safely complete wants and needs during IADLs. The skills of a therapist will be required to safely and effectively implement the following treatment plan to restore maximal level of function.          Goals  Problem: Executive Functioning Deficit 60-79% FCM 3/7      LTG 1: 12 weeks: Patient will increase Functional Communication Measure level to 6/7 for thought organization and problem solving skills for safe completion of IADL tasks.      STATUS:  New      STG 1a: 12 weeks:  Patient will complete mod complex thought organization tasks (calendar, scheduling, medicine managment, checkbook registrar,  etc)  with min to mod assist to increase organization to solve moderately complex ADL problems.   STATUS: New    STG 1b:12 weeks: Patient will complete mod complex  reasoning tasks (money management, counting change, time managment, deductive reasoning tasks, etc.) with min to mod assist to use appropriate strategies for ADLs.    STATUS: New Goal  Treatment:  Speech therapy using a clinician directed approach to increase safe executive functioning  skills during ADLS.    2. Attention disturbance   LTG2:  12weeks: Patient will increase attention to encode information and increase accurate processing of information for min to mod complex decision making skills during ADLS.    STG 2a: 12 weeks:  Patient will sustain attention to basic tasks for 45 minutes with min assist through redirection of sustained attention.    STATUS:  New   STG 2b: 12 weeks.  Patient  will complete divided attention tasks with min assist to attend to multiple information required to make appropriate and timely decisions for ADLS.    STATUS: New   STG 2c:  12 weeks. Patient will complete alternating attention tasks with min assist to encode information required to complete multiple tasks.    STATUS: New  Treatment:  Speech therapy to increase attention for safe completion of IADLs.       3.Problem: Motor speech limitation      LTG 3: 12 weeks: Patient will increase oral motor strength for safe communication of wants and needs during IADLs.    Status: New   STG 3a: 12 weeks: Patient will increase labial strength to a 4+/5    Status: New   STG 3b: 12 weeks.  Patient will increase lingual strength to a 4+/5   Status: New  Treatment:  Speech therapy to increase oral motor strength for communication.         ASSESSMENT  Rehabilitation Potential:  excellent  Barriers to Learning:  None  PLAN     Frequency (times/week): 2 times per week    Duration: 12 weeks    Time Calculation:   Assessment Time: 08:10- 0905 for a total of 55 minutes.  Interpretation Time: 11:45  - 12:36 for a total of 51 minutes.  Total Time: 106 minutes      Thank you for the referral.  Marisela Nuñez MS, CCC-SLP     SIGNATURE: MARC Hanley     Electronically signed 12/16/2024    [place] License: [id number]      Please sign and return

## 2024-12-17 ENCOUNTER — HOSPITAL ENCOUNTER (OUTPATIENT)
Facility: HOSPITAL | Age: 64
Setting detail: THERAPIES SERIES
Discharge: HOME | End: 2024-12-17
Payer: COMMERCIAL

## 2024-12-17 ENCOUNTER — TELEPHONE (OUTPATIENT)
Dept: CASE MANAGEMENT | Facility: OTHER | Age: 64
End: 2024-12-17
Payer: COMMERCIAL

## 2024-12-17 DIAGNOSIS — R53.1 LEFT-SIDED WEAKNESS: ICD-10-CM

## 2024-12-17 DIAGNOSIS — I63.411 CEREBROVASCULAR ACCIDENT (CVA) DUE TO EMBOLISM OF RIGHT MIDDLE CEREBRAL ARTERY: Primary | ICD-10-CM

## 2024-12-17 DIAGNOSIS — R41.841 COGNITIVE COMMUNICATION DEFICIT: Primary | ICD-10-CM

## 2024-12-17 PROCEDURE — 97129 THER IVNTJ 1ST 15 MIN: CPT | Performed by: SPEECH-LANGUAGE PATHOLOGIST

## 2024-12-17 PROCEDURE — 97130 THER IVNTJ EA ADDL 15 MIN: CPT | Performed by: SPEECH-LANGUAGE PATHOLOGIST

## 2024-12-17 PROCEDURE — 97110 THERAPEUTIC EXERCISES: CPT | Performed by: PHYSICAL THERAPIST

## 2024-12-17 PROCEDURE — 97530 THERAPEUTIC ACTIVITIES: CPT | Performed by: PHYSICAL THERAPIST

## 2024-12-17 PROCEDURE — 97162 PT EVAL MOD COMPLEX 30 MIN: CPT | Performed by: PHYSICAL THERAPIST

## 2024-12-17 NOTE — TELEPHONE ENCOUNTER
Chart reviewed and she is going for her speech therapy at [facility] and had a physical therapy evaluation today. Closing HRCM for goals met.

## 2024-12-17 NOTE — PROGRESS NOTES
Subjective   Patient ID: Lauren Marie is a 64 y.o. female is being seen for consultation today at the request of Andrea Vega MD for    - Cerebrovascular accident (CVA), unspecified mechanism  - Cerebrovascular accident (CVA) due to embolism of left middle cerebral artery     New patient, in ED on 11/22/2024    Ct head w/o contrast completed on 11/22/2024.  MRI brain w/o contrast completed on 11/23/2024.    Ct angiogram carotids completed on 11/23/2024.  Ct angiogram head completed on 11/23/2024.        Patient had a stroke.  Patient reports having headaches at times with balance issues.  Denies vision changes.  Walking with a cane.    Patient is doing PT and speech therapy,  stiffness on left arm, patient denies pain or numbness.                      History of Present Illness  Susan is a very pleasant 64-year-old female who recently suffered a stroke approximately 4 weeks ago.  She presented with left-sided weakness and slurred speech.  She was found to have an occlusion of her right internal carotid artery.  Since then she has been home and doing outpatient physical therapy.  She is doing very well and able to ambulate with only moderate weakness of the left upper extremity.  She presents to my clinic today for evaluation of her carotid occlusion, left M1 moderate to severe stenosis and a right MCA trifurcation aneurysm.    She is a former smoker with a proximately 55 to 60-year pack history.  She does not have any family history of aneurysm rupture or connective tissue disease.  She does have hypertension.  She has recently quit smoking after her stroke.  She does suffer from some headaches after her stroke but these are alleviated by Tylenol.  She denies any thunderclap headaches in the past or present.      The following portions of the patient's history were reviewed and updated as appropriate: allergies, current medications, past family history, past medical history, past social history, past  "surgical history, and problem list.    Objective     Vitals:    12/19/24 0829   BP: 140/68   BP Location: Right arm   Patient Position: Sitting   Cuff Size: Adult   Pulse: 79   Resp: 16   Temp: 98.3 °F (36.8 °C)   TempSrc: Temporal   SpO2: 98%   Weight: 69.9 kg (154 lb)   Height: 167.6 cm (65.98\")   PainSc: 0-No pain     Body mass index is 24.87 kg/m².    Lab Results   Component Value Date    WBC 9.11 11/22/2024    HGB 15.7 11/22/2024    HCT 45.3 11/22/2024    MCV 92.3 11/22/2024     11/22/2024     Lab Results   Component Value Date    GLUCOSE 101 (H) 11/22/2024    CALCIUM 10.2 11/22/2024     11/22/2024    K 3.3 (L) 11/22/2024    CO2 27.7 11/22/2024     11/22/2024    BUN 12 11/22/2024    CREATININE 0.65 11/22/2024    EGFR 98.5 11/22/2024    BCR 18.5 11/22/2024    ANIONGAP 10.3 11/22/2024       Physical Exam:    NAD  A&O3  Face very very mild left facial droop, pupils equal round and reactive to light, EOMI  Motor:   5/5 right upper extremity, 4 out of 5 in right upper extremity  5/5 BLE  Gait normal  Left pronator drift      Assessment & Plan       Lauren Marie  reports that she quit smoking about 5 weeks ago. Her smoking use included cigarettes. She started smoking about 44 years ago. She has a 22.4 pack-year smoking history. She has been exposed to tobacco smoke. She has never used smokeless tobacco.      Independent Review of Radiographic Studies:      I personally reviewed the images from the following studies.    MRI of the brain without contrast performed 11/23/2024 at [facility] there is multiple areas of acute to subacute infarcts noted within the right MCA territory there is no evidence of previous ischemic insults in the left cerebral cortex.  She does have evidence of small vessel disease bilaterally.    CTA of the head and neck performed 11/23/2024 at [facility] there is occlusion of the right internal carotid artery at the origin with a rounded appearance suggesting that " this may be mostly chronic in origin.  There is less than 50% stenosis of the left carotid artery at the bifurcation.  There is a short segment stenosis of the distal left M1.  There appear to be some luminal irregularities that may be fibromuscular dysplasia versus atherosclerosis.  There is an aneurysm that is approximately 4 mm at the right MCA trifurcation.  There is possibly an aneurysm at the left MCA trifurcation.  There is also potentially a left PCA origin/basilar aneurysm    Medical Decision Making:      I spent 45 minutes caring for Lauren on this date of service. This time includes time spent by me in the following activities:preparing for the visit, reviewing tests, obtaining and/or reviewing a separately obtained history, performing a medically appropriate examination and/or evaluation , counseling and educating the patient/family/caregiver, ordering medications, tests, or procedures, documenting information in the medical record, independently interpreting results and communicating that information with the patient/family/caregiver, and care coordination    Assessment & Plan  Cerebral aneurysm, nonruptured  There appeared to be multiple unruptured cerebral aneurysms on the CTA.  I believe a diagnostic angiogram is warranted in this situation and is also to assess the left M1 iCAD stenosis.    I discussed with the patient and her  that a diagnostic angiogram is indicated to better characterize her vasculature. The risks of the procedure are in total <1% including hematoma, dissection, renal insuffiencey, allergic reaction, and stroke. She understands the risks and would like to proceed with the procedure in January.  I discussed with her that depending on the results of the study we will discuss observation, endovascular embolization, or microsurgical clipping. I have discussed the patient's management with the patient, my team, and her family.  I did quote her a 1-2% per year risk of a  subarachnoid hemorrhage and counseled her about the symptoms/clinical findings if such an event would happen. \    Orders:    Obtain Informed Consent; Standing    IR Arch & 3 Vessel Head; Future    CBC (No Diff); Future    Basic Metabolic Panel; Future    Carotid artery stenosis with cerebral infarction  Nothing to do for the occluded right internal carotid artery       Stenosis of cerebral artery  She is on dual antiplatelet therapy and we will follow-up with a P2 Y12 level to make sure that she is therapeutic.  The angiogram discussed above will also help us better elucidate the degree of stenosis of the left M1           Return for For diagnostic angiogram and postop appointment 2 to 3 weeks afterwards to discuss findings.     This note was completed using Dragon Dictation software.

## 2024-12-17 NOTE — THERAPY EVALUATION
Physical Therapy Initial Evaluation and Plan of Care   [address] [place] [address]       Patient: Lauren Marie   : 1960  Diagnosis/ICD-10 Code:      ICD-10-CM ICD-9-CM   1. Cerebrovascular accident (CVA) due to embolism of right middle cerebral artery  I63.411 434.11   2. Left-sided weakness  R53.1 728.87     Referring practitioner: Andrea Vega MD  Date of Initial Visit: 2024  Today's Date: 2024  Patient seen for 1 sessions           Subjective Questionnaire: LEFS: 61/80=24% limitation      Subjective Evaluation    History of Present Illness  Mechanism of injury: Patient is a 64 yr old female referred to physical therapy with diagnosis of right MCA CVA with left sided weakness.  Patient was independent with mobility prior to CVA. Patient states no pain just stiffness in left hand. Patient states difficulty with ambulation secondary to left sided weakness. Patient has decrease use of left hand (patient is right hand dominate).     Pain  No pain reported    Patient Goals  Patient goals for therapy: increased strength, improved balance, independence with ADLs/IADLs and increased motion  Patient goal: to be able to drive again; use left hand better         Objective          Active Range of Motion   Left Shoulder   Normal active range of motion    Right Shoulder   Normal active range of motion    Left Wrist   Normal active range of motion    Right Wrist   Normal active range of motion  Left Hip   Normal active range of motion    Right Hip   Normal active range of motion  Left Knee   Normal active range of motion    Right Knee   Normal active range of motion    Strength/Myotome Testing     Left Shoulder     Planes of Motion   Flexion: 4   Extension: 4   Abduction: 4     Right Shoulder   Normal muscle strength    Left Wrist/Hand   Wrist extension: 3+  Wrist flexion: 3+    Left Hip   Planes of Motion   Flexion: 4  Extension: 4  Abduction: 4    Right Hip   Planes of Motion   Flexion:  4+  Extension: 4+  Abduction: 4+    Left Knee   Flexion: 4  Extension: 4    Right Knee   Flexion: 4+  Extension: 4+     General Comments     Wrist/Hand Comments  Patient demonstrates difficulty coordinating left hand/wrist movements         Assessment & Plan       Assessment  Impairments: abnormal coordination, abnormal gait, abnormal muscle firing, activity intolerance, impaired balance, impaired physical strength and lacks appropriate home exercise program   Functional limitations: walking, standing, stooping and unable to perform repetitive tasks   Assessment details: Pt presents with limitations, noted by evaluation that impede patient's ability to ambulate/perform functional activity/mobility.  The skills of a therapist will be required to safely and effectively implement the following treatment plan to restore maximal level of function.      Prognosis: good    Goals  Plan Goals:  1. Mobility: Walking/Moving Around Functional Limitation     LTG 1:12 weeks:  The patient will demonstrate 6% limitation by achieving a score of 75/80 on the Lower Extremity Functional Scale.   STATUS:  New   STG 1a: 6 weeks:  The patient will demonstrate 12% limitation by achieving a score of 60/80 on the Lower Extremity Functional Scale.     STATUS:  New      2. The patient demonstrates weakness of the left hip/knee/wrist/shoulder.   LTG 2: 12weeks:  The patient will demonstrate 5/5 strength for left hip flexion, abduction, and extension in order to improve hip stability.   STATUS:  New   STG 2a: 6weeks:  The patient will demonstrate 4+/5 strength for left wrist flexion, and extension and left shoulder  STATUS:  New      3. The patient has gait dysfunction.   LTG 3: 12 weeks:  The patient will ambulate without assistive device, independently, for community distances with minimal limp to the left lower extremity in order to improve mobility and allow patient to perform activities such as grocery shopping with greater ease.   STATUS:   New   STG 3a: The patient will be independent in HEP.   STATUS:  New       Plan  Therapy options: will be seen for skilled therapy services  Planned therapy interventions: manual therapy, strengthening, stretching, therapeutic activities, balance/weight-bearing training, flexibility, functional ROM exercises, home exercise program and gait training  Frequency: 2x week  Duration in weeks: 12  Treatment plan discussed with: patient      History # of Personal Factors and/or Comorbidities: MODERATE (1-2)  Examination of Body System(s): # of elements: MODERATE (3)  Clinical Presentation: STABLE   Clinical Decision Making: MODERATE      Visit Diagnoses:    ICD-10-CM ICD-9-CM   1. Cerebrovascular accident (CVA) due to embolism of right middle cerebral artery  I63.411 434.11   2. Left-sided weakness  R53.1 728.87       Timed:  Manual Therapy:         mins  80051;  Therapeutic Exercise:    14     mins  31276;     Neuromuscular Yuri:        mins  59330;    Therapeutic Activity:     10     mins  84266;     Gait Training:           mins  16432;     Ultrasound:          mins  82758;    Electrical Stimulation:         mins  33656 ( );    Untimed:  Electrical Stimulation:         mins  48395 ( );  Mechanical Traction:         mins  05841;    PT evaluation     Low Eval                           Mins  59476  Mod Eval                        30     Mins  91267  High Eval                           Mins  04016    Timed Treatment:   24   mins   Total Treatment:     54   mins    PT SIGNATURE: Radha Chaudhari PT     Electronically singed 12/17/2024    [place] PT license: [id number]        Initial Certification  Certification Period: 12/17/2024 thru 3/16/2025  I certify that the therapy services are furnished while this patient is under my care.  The services outlined above are required by this patient, and will be reviewed every 90 days.    PHYSICIAN: Andrea Vega MD  NPI: [id number]                                       DATE:     Please sign and return via fax to [phone] you, [facility].

## 2024-12-17 NOTE — THERAPY TREATMENT NOTE
Outpatient Adult Speech Language Therapy           Treatment Note    Patient: Lauren Marie                                                                                     Visit Date: 2024  :     1960    Referring practitioner:    Andrea Vega MD  Date of Initial Visit:          Type: THERAPY  Episode: Left MCA CVA; Facial droop    Patient seen for 2 sessions    Visit Diagnoses:    ICD-10-CM ICD-9-CM   1. Cognitive communication deficit  R41.841 799.52     SUBJECTIVE   Patient requires the skills of a therapist to increase executive functioning through cognitive therapy for safe and accurate completion of IADLS.       OBJECTIVE   GOALS    GOALS ACTIVITY PERFORMED TRIALS COMPLETED LEVEL OF CUEING REQUIRED   LTG 1: 12 weeks: Patient will increase Functional Communication Measure level to 6/7 for thought organization and problem solving skills for safe completion of IADL tasks.                            STATUS:  New                        STG 1a: 12 weeks:  Patient will complete mod complex thought organization tasks (calendar, scheduling, medicine managment, checkbook registrar,  etc)  with min to mod assist to increase organization to solve moderately complex ADL problems. Reading a analog clock Telling time 610 Mod to max assist   STG 1b:12 weeks: Patient will complete mod complex  reasoning tasks (money management, counting change, time managment, deductive reasoning tasks, etc.) with min to mod assist to use appropriate strategies for ADLs.  Counting change and making change 6/10  Mod to max assist   STG 2a: 12 weeks:  Patient will sustain attention to basic tasks for 45 minutes with min assist through redirection of sustained attention.       STG 1b:12 weeks: Patient will complete mod complex  reasoning tasks (money management, counting change, time managment, deductive reasoning tasks, etc.) with min to mod assist  to use appropriate strategies for ADLs.        STG 2a: 12 weeks:  Patient will sustain attention to basic tasks for 45 minutes with min assist through redirection of sustained attention.       STG 2b: 12 weeks.  Patient will complete divided attention tasks with min assist to attend to multiple information required to make appropriate and timely decisions for ADLS.       STG 2c:  12 weeks. Patient will complete alternating attention tasks with min assist to encode information required to complete multiple tasks.      STG 3a: 12 weeks: Patient will increase labial strength to a 4+/5      Labial strengthening 10 repetitions of labial purse and hold and retract and hold    10 repetitions of labial closure and push    10 repetitions of bolus air shift with labial pursing and exhaling through a breath stream. Min assist    Goal met   STG 3b: 12 weeks.  Patient will increase lingual strength to a 4+/5 Lingual strengthening Lingual sweeps times 5  Lingual protrusion and hold 5 seconds times 5 Min assist    Goal met        Total Time of Visit:             08:= 43   mins         ASSESSMENT/PLAN     ASSESSMENT: Patient requires the skills of a therapist to provide maximum cueing to increase thought organization, reasoning skills and oral strength for safe communication and executive functioning skills to complete IADLs.   Progress: Patient met goals 3a and 3b  PLAN: Continue plan of care    Progress Note Due Date:1/16/25  Recertification Due Date:3/14/25    SIGNATURE: Marisela Nuñez, SLP, [place] License #: [id number]  Electronically Signed on 12/17/2024            Adult Outpatient Rehabiliation

## 2024-12-19 ENCOUNTER — TELEPHONE (OUTPATIENT)
Dept: FAMILY MEDICINE CLINIC | Facility: CLINIC | Age: 64
End: 2024-12-19

## 2024-12-19 ENCOUNTER — OFFICE VISIT (OUTPATIENT)
Dept: NEUROSURGERY | Facility: CLINIC | Age: 64
End: 2024-12-19
Payer: COMMERCIAL

## 2024-12-19 VITALS
BODY MASS INDEX: 24.75 KG/M2 | WEIGHT: 154 LBS | DIASTOLIC BLOOD PRESSURE: 68 MMHG | HEIGHT: 66 IN | OXYGEN SATURATION: 98 % | TEMPERATURE: 98.3 F | HEART RATE: 79 BPM | SYSTOLIC BLOOD PRESSURE: 140 MMHG | RESPIRATION RATE: 16 BRPM

## 2024-12-19 DIAGNOSIS — I66.9 STENOSIS OF CEREBRAL ARTERY: ICD-10-CM

## 2024-12-19 DIAGNOSIS — I67.1 CEREBRAL ANEURYSM, NONRUPTURED: Primary | ICD-10-CM

## 2024-12-19 DIAGNOSIS — I63.239 CAROTID ARTERY STENOSIS WITH CEREBRAL INFARCTION: ICD-10-CM

## 2024-12-19 RX ORDER — IBUPROFEN 800 MG/1
800 TABLET, FILM COATED ORAL EVERY 6 HOURS PRN
COMMUNITY

## 2024-12-19 NOTE — ASSESSMENT & PLAN NOTE
She is on dual antiplatelet therapy and we will follow-up with a P2 Y12 level to make sure that she is therapeutic.  The angiogram discussed above will also help us better elucidate the degree of stenosis of the left M1

## 2024-12-19 NOTE — ASSESSMENT & PLAN NOTE
There appeared to be multiple unruptured cerebral aneurysms on the CTA.  I believe a diagnostic angiogram is warranted in this situation and is also to assess the left M1 iCAD stenosis.    I discussed with the patient and her  that a diagnostic angiogram is indicated to better characterize her vasculature. The risks of the procedure are in total <1% including hematoma, dissection, renal insuffiencey, allergic reaction, and stroke. She understands the risks and would like to proceed with the procedure in January.  I discussed with her that depending on the results of the study we will discuss observation, endovascular embolization, or microsurgical clipping. I have discussed the patient's management with the patient, my team, and her family.  I did quote her a 1-2% per year risk of a subarachnoid hemorrhage and counseled her about the symptoms/clinical findings if such an event would happen. \    Orders:    Obtain Informed Consent; Standing    IR Arch & 3 Vessel Head; Future    CBC (No Diff); Future    Basic Metabolic Panel; Future

## 2024-12-19 NOTE — TELEPHONE ENCOUNTER
Caller: Lauren Marie    Relationship: Self    Best call back number: [phone]    What is the best time to reach you: ANYTIME    Who are you requesting to speak with (clinical staff, provider,  specific staff member): NURSE       What was the call regarding: PATIENT IS CALLING REQUESTING A REFILL ON HER POTASSIUM PILLS.  PLEASE SEND TO [facility] IN [place], [place]

## 2024-12-20 DIAGNOSIS — Z51.81 MEDICATION MONITORING ENCOUNTER: Primary | ICD-10-CM

## 2024-12-23 ENCOUNTER — LAB (OUTPATIENT)
Facility: HOSPITAL | Age: 64
End: 2024-12-23
Payer: COMMERCIAL

## 2024-12-23 DIAGNOSIS — Z51.81 MEDICATION MONITORING ENCOUNTER: ICD-10-CM

## 2024-12-23 DIAGNOSIS — I67.1 CEREBRAL ANEURYSM, NONRUPTURED: ICD-10-CM

## 2024-12-23 LAB
ALBUMIN SERPL-MCNC: 4.1 G/DL (ref 3.5–5.2)
ALBUMIN/GLOB SERPL: 1.3 G/DL
ALP SERPL-CCNC: 74 U/L (ref 39–117)
ALT SERPL W P-5'-P-CCNC: 25 U/L (ref 1–33)
ANION GAP SERPL CALCULATED.3IONS-SCNC: 7.3 MMOL/L (ref 5–15)
AST SERPL-CCNC: 20 U/L (ref 1–32)
BILIRUB SERPL-MCNC: 0.4 MG/DL (ref 0–1.2)
BUN SERPL-MCNC: 13 MG/DL (ref 8–23)
BUN/CREAT SERPL: 13.7 (ref 7–25)
CALCIUM SPEC-SCNC: 9.9 MG/DL (ref 8.6–10.5)
CHLORIDE SERPL-SCNC: 108 MMOL/L (ref 98–107)
CO2 SERPL-SCNC: 24.7 MMOL/L (ref 22–29)
CREAT SERPL-MCNC: 0.95 MG/DL (ref 0.57–1)
DEPRECATED RDW RBC AUTO: 42.5 FL (ref 37–54)
EGFRCR SERPLBLD CKD-EPI 2021: 67 ML/MIN/1.73
ERYTHROCYTE [DISTWIDTH] IN BLOOD BY AUTOMATED COUNT: 12.3 % (ref 12.3–15.4)
GLOBULIN UR ELPH-MCNC: 3.1 GM/DL
GLUCOSE SERPL-MCNC: 95 MG/DL (ref 65–99)
HCT VFR BLD AUTO: 41.5 % (ref 34–46.6)
HGB BLD-MCNC: 14.5 G/DL (ref 12–15.9)
MCH RBC QN AUTO: 32.8 PG (ref 26.6–33)
MCHC RBC AUTO-ENTMCNC: 34.9 G/DL (ref 31.5–35.7)
MCV RBC AUTO: 93.9 FL (ref 79–97)
PLATELET # BLD AUTO: 421 10*3/MM3 (ref 140–450)
PMV BLD AUTO: 8.8 FL (ref 6–12)
POTASSIUM SERPL-SCNC: 4 MMOL/L (ref 3.5–5.2)
PROT SERPL-MCNC: 7.2 G/DL (ref 6–8.5)
RBC # BLD AUTO: 4.42 10*6/MM3 (ref 3.77–5.28)
SODIUM SERPL-SCNC: 140 MMOL/L (ref 136–145)
WBC NRBC COR # BLD AUTO: 6.61 10*3/MM3 (ref 3.4–10.8)

## 2024-12-23 PROCEDURE — 36415 COLL VENOUS BLD VENIPUNCTURE: CPT

## 2024-12-23 PROCEDURE — 80053 COMPREHEN METABOLIC PANEL: CPT

## 2024-12-23 PROCEDURE — 85027 COMPLETE CBC AUTOMATED: CPT

## 2024-12-23 RX ORDER — POTASSIUM CHLORIDE 750 MG/1
10 TABLET, EXTENDED RELEASE ORAL DAILY
Qty: 30 TABLET | Refills: 1 | Status: SHIPPED | OUTPATIENT
Start: 2024-12-23

## 2024-12-23 RX ORDER — CLOPIDOGREL BISULFATE 75 MG/1
75 TABLET ORAL DAILY
Qty: 30 TABLET | Refills: 0 | Status: SHIPPED | OUTPATIENT
Start: 2024-12-23

## 2024-12-23 NOTE — TELEPHONE ENCOUNTER
Caller: Lauren Marie    Relationship: Self    Best call back number: [phone]     Requested Prescriptions:   Requested Prescriptions     Pending Prescriptions Disp Refills    clopidogrel (PLAVIX) 75 MG tablet 30 tablet 0     Sig: Take 1 tablet by mouth Daily.    potassium chloride 10 MEQ CR tablet       Sig: Take 1 tablet by mouth Daily.        Pharmacy where request should be sent: [id number] [address] & [place]  [phone] [facility] [phone]      Last office visit with prescribing clinician: 11/27/2024   Last telemedicine visit with prescribing clinician: Visit date not found   Next office visit with prescribing clinician: Visit date not found     Additional details provided by patient: PATIENT STATES SHE NEEDS MORE POTASSIUM AND IF SHE IS SUPPOSED TO HAVE STOPPED TAKING IT THEN LET HER KNOW.     Does the patient have less than a 3 day supply:  [x] Yes  [] No    Hardik Franklin Rep   12/23/24 11:18 EST

## 2024-12-30 ENCOUNTER — HOSPITAL ENCOUNTER (OUTPATIENT)
Facility: HOSPITAL | Age: 64
Setting detail: THERAPIES SERIES
Discharge: HOME | End: 2024-12-30
Payer: COMMERCIAL

## 2024-12-30 DIAGNOSIS — R41.841 COGNITIVE COMMUNICATION DEFICIT: Primary | ICD-10-CM

## 2024-12-30 DIAGNOSIS — I63.411 CEREBROVASCULAR ACCIDENT (CVA) DUE TO EMBOLISM OF RIGHT MIDDLE CEREBRAL ARTERY: Primary | ICD-10-CM

## 2024-12-30 DIAGNOSIS — R53.1 LEFT-SIDED WEAKNESS: ICD-10-CM

## 2024-12-30 PROCEDURE — 97110 THERAPEUTIC EXERCISES: CPT | Performed by: PHYSICAL THERAPIST

## 2024-12-30 PROCEDURE — 97530 THERAPEUTIC ACTIVITIES: CPT | Performed by: PHYSICAL THERAPIST

## 2024-12-30 PROCEDURE — 97129 THER IVNTJ 1ST 15 MIN: CPT | Performed by: SPEECH-LANGUAGE PATHOLOGIST

## 2024-12-30 PROCEDURE — 97130 THER IVNTJ EA ADDL 15 MIN: CPT | Performed by: SPEECH-LANGUAGE PATHOLOGIST

## 2024-12-30 NOTE — THERAPY TREATMENT NOTE
Outpatient Adult Speech Language Therapy           Treatment Note    Patient: Lauren Marie                                                                                     Visit Date: 2024  :     1960    Referring practitioner:    Andrea Vega MD  Date of Initial Visit:          Type: THERAPY  Episode: Right MCA CVA; Facial droop    Patient seen for 3 sessions    Visit Diagnoses:    ICD-10-CM ICD-9-CM   1. Cognitive communication deficit  R41.841 799.52     SUBJECTIVE   Patient requires the skills of a therapist to increase executive functioning through cognitive therapy for safe and accurate completion of IADLS.       Education:  How attention affects working memory and safe problem solving.   OBJECTIVE   GOALS     GOALS ACTIVITY PERFORMED TRIALS COMPLETED LEVEL OF CUEING REQUIRED   LTG 1: 12 weeks: Patient will increase Functional Communication Measure level to 6/7 for thought organization and problem solving skills for safe completion of IADL tasks.                            STATUS:  New                           STG 1a: 12 weeks:  Patient will complete mod complex thought organization tasks (calendar, scheduling, medicine managment, checkbook registrar,  etc)  with min to mod assist to increase organization to solve moderately complex ADL problems.      STG 1b:12 weeks: Patient will complete mod complex  reasoning tasks (money management, counting change, time managment, deductive reasoning tasks, etc.) with min to mod assist to use appropriate strategies for ADLs.       STG 2a: 12 weeks:  Patient will sustain attention to basic tasks for 45 minutes with min assist through redirection of sustained attention.        Patient given 60 seconds to scan row of numbers and cancel all 1 and 5 numbers.      secondary to decreased visual attention    Trial two- patient reduced errors to 5 with patient able to recall left  to right scanning techniques      Trial three- reduced to one error Mod to max assist to maintain visual attention to each line secondary to decreased visual organization.     STG 2b: 12 weeks.  Patient will complete divided attention tasks with min assist to attend to multiple information required to make appropriate and timely decisions for ADLS.  Divided attention  visual attention- tapping fingers when a red card emerges from the pile.   min assist    STG 2c:  12 weeks. Patient will complete alternating attention tasks with min assist to encode information required to complete multiple tasks.         STG 3a: 12 weeks: Patient will increase labial strength to a 4+/5      Labial strengthening  Min assist     Goal met   STG 3b: 12 weeks.  Patient will increase lingual strength to a 4+/5 Lingual strengthening  Min assist     Goal met         Total Time of Visit:             10:45-11:30= 45 minutes      ASSESSMENT/PLAN      ASSESSMENT: Patient requires the skills of a therapist to provide mod to maximum cueing to increase attention for  executive functioning skills to complete IADLs.   Progress:     PLAN: Continue plan of care     Progress Note Due Date:1/16/25  Recertification Due Date:3/14/25     SIGNATURE: Marisela Nuñez, SLP, [place] License #: [id number]    Electronically Signed on 12/30/2024            Adult Outpatient Rehabiliation

## 2024-12-30 NOTE — THERAPY TREATMENT NOTE
Outpatient Physical Therapy Neuro Treatment Note   [facility]     Patient Name: Lauren Marie  : 1960  MRN: [id number]  Today's Date: 2024      Visit Date: 2024    Visit Dx:    ICD-10-CM ICD-9-CM   1. Cerebrovascular accident (CVA) due to embolism of right middle cerebral artery  I63.411 434.11   2. Left-sided weakness  R53.1 728.87       Patient Active Problem List   Diagnosis    Cigarette nicotine dependence without complication    Transient ischemic attack    Chronic back pain    Vertigo    Primary hypertension    CVA (cerebral vascular accident)    Cerebral aneurysm, nonruptured    Carotid artery stenosis with cerebral infarction    Stenosis of cerebral artery                        PT Assessment/Plan       Row Name 24 1000          PT Assessment    Functional Limitations Decreased safety during functional activities;Impaired gait;Limitations in functional capacity and performance;Performance in self-care ADL  -RO     Impairments Balance;Cognition;Coordination;Endurance;Gait;Muscle strength  -RO     Assessment Comments Patient able to tolerate progression of strengthening/coordination activity; still needing moderate to max cues for correct performance of activity.  -RO     Rehab Potential Good  -RO     Patient/caregiver participated in establishment of treatment plan and goals Yes  -RO     Patient would benefit from skilled therapy intervention Yes  -RO        PT Plan    PT Plan Comments continue per plan of care  -RO               User Key  (r) = Recorded By, (t) = Taken By, (c) = Cosigned By      Initials Name Provider Type    RO Radha Chaudhari, PT Physical Therapist                        OP Exercises       Row Name 24 1000             Subjective    Subjective Comments Patient reports able to move her left hand better since last session; states some leg ache at night secondary to prolonged activity during the day.  -RO         Subjective Pain    Able to rate  "subjective pain? yes  -RO      Pre-Treatment Pain Level 0  -RO      Post-Treatment Pain Level 0  -RO      Subjective Pain Comment Patient reports \"stiffness\" but not pain  -RO         Total Minutes    00193 - PT Therapeutic Exercise Minutes 26  -RO      82308 - PT Therapeutic Activity Minutes 16  -RO         Exercise 1    Exercise Name 1 wrist curls 3 ways  -RO      Cueing 1 Verbal;Tactile;Demo  -RO      Sets 1 2lbs  -RO      Reps 1 x10 each direction  -RO      Time 1 Left  -RO         Exercise 2    Exercise Name 2 recumbent bike  -RO      Cueing 2 Verbal  -RO      Sets 2 --  -RO      Time 2 x4 min  -RO         Exercise 3    Exercise Name 3 UBE  -RO      Cueing 3 Tactile  -RO      Sets 3 x2 min forward  -RO      Reps 3 unable to do reverse  -RO         Exercise 4    Exercise Name 4 cross over stepping  -RO      Cueing 4 Verbal;Tactile;Demo  -RO      Sets 4 x20 ft each direction  -RO         Exercise 5    Exercise Name 5 tandem stepping  -RO      Cueing 5 Verbal;Tactile;Demo  -RO      Sets 5 30ft x1  -RO         Exercise 6    Exercise Name 6 bridges  -RO      Cueing 6 Verbal;Tactile  -RO      Reps 6 x10 with 3 sec hold  -RO         Exercise 7    Exercise Name 7 pulleys into flexion  -RO      Cueing 7 Verbal;Tactile;Demo  -RO      Time 7 x2 min  -RO         Exercise 8    Exercise Name 8 hooklying alt UE/LE flexion  -RO      Cueing 8 Verbal;Tactile;Demo  -RO      Sets 8 --  -RO      Reps 8 x10 each side  -RO      Time 8 --  -RO         Exercise 9    Exercise Name 9 standing hip abduction  -RO      Cueing 9 Verbal;Demo  -RO      Sets 9 at rail  -RO      Reps 9 x10 each LE  -RO      Time 9 2lb ankle weight  -RO         Exercise 10    Exercise Name 10 standing marching  -RO      Cueing 10 Verbal;Demo  -RO      Sets 10 at rail  -RO      Reps 10 x10 each LE  -RO      Time 10 2lb ankle weight  -RO         Exercise 11    Exercise Name 11 rows  -RO      Cueing 11 Verbal;Tactile;Demo  -RO      Sets 11 level 1  -RO      Reps 11 " x10  -RO      Time 11 CGA of therapist  -RO                User Key  (r) = Recorded By, (t) = Taken By, (c) = Cosigned By      Initials Name Provider Type    RO Radha Chaudhari PT Physical Therapist                                                   Time Calculation:   Timed Charges  84757 - PT Therapeutic Exercise Minutes: 26  30218 - PT Therapeutic Activity Minutes: 16  Total Minutes  Timed Charges Total Minutes: 42   Total Minutes: 42                 Radha Chaudhari PT  12/30/2024

## 2024-12-31 ENCOUNTER — HOSPITAL ENCOUNTER (OUTPATIENT)
Facility: HOSPITAL | Age: 64
Setting detail: THERAPIES SERIES
Discharge: HOME | End: 2024-12-31
Payer: COMMERCIAL

## 2024-12-31 DIAGNOSIS — R53.1 LEFT-SIDED WEAKNESS: ICD-10-CM

## 2024-12-31 DIAGNOSIS — I63.411 CEREBROVASCULAR ACCIDENT (CVA) DUE TO EMBOLISM OF RIGHT MIDDLE CEREBRAL ARTERY: Primary | ICD-10-CM

## 2024-12-31 DIAGNOSIS — R41.841 COGNITIVE COMMUNICATION DEFICIT: Primary | ICD-10-CM

## 2024-12-31 LAB — LV EF 2D ECHO EST: 55 %

## 2024-12-31 PROCEDURE — 97129 THER IVNTJ 1ST 15 MIN: CPT | Performed by: SPEECH-LANGUAGE PATHOLOGIST

## 2024-12-31 PROCEDURE — 97110 THERAPEUTIC EXERCISES: CPT | Performed by: PHYSICAL THERAPIST

## 2024-12-31 PROCEDURE — 97530 THERAPEUTIC ACTIVITIES: CPT | Performed by: PHYSICAL THERAPIST

## 2024-12-31 PROCEDURE — 97130 THER IVNTJ EA ADDL 15 MIN: CPT | Performed by: SPEECH-LANGUAGE PATHOLOGIST

## 2024-12-31 NOTE — THERAPY TREATMENT NOTE
Outpatient Adult Speech Language Therapy           Treatment Note    Patient: Lauren Marie                                                                                     Visit Date: 2024  :     1960    Referring practitioner:    Andrea Vega MD  Date of Initial Visit:          Type: THERAPY  Episode: Right MCA CVA; Facial droop    Patient seen for 4 sessions    Visit Diagnoses:    ICD-10-CM ICD-9-CM   1. Cognitive communication deficit  R41.841 799.52     SUBJECTIVE   Patient requires the skills of a therapist to increase executive functioning through cognitive therapy for safe and accurate completion of IADLS.       Education:  How attention affects working memory and safe problem solving. Patient's caregiver attended session. Mental and visual fatigue set in after 20-30 minutes and affected performance  OBJECTIVE   GOALS     GOALS ACTIVITY PERFORMED TRIALS COMPLETED LEVEL OF CUEING REQUIRED   LTG 1: 12 weeks: Patient will increase Functional Communication Measure level to 6/7 for thought organization and problem solving skills for safe completion of IADL tasks.                            STATUS:  New                           STG 1a: 12 weeks:  Patient will complete mod complex thought organization tasks (calendar, scheduling, medicine managment, checkbook registrar,  etc)  with min to mod assist to increase organization to solve moderately complex ADL problems.         STG 1b:12 weeks: Patient will complete mod complex  reasoning tasks (money management, counting change, time managment, deductive reasoning tasks, etc.) with min to mod assist to use appropriate strategies for ADLs.   reasoning tasks  Patient able to identify deficits and how they can affect IADLs.   mod assist   STG 2a: 12 weeks:  Patient will sustain attention to basic tasks for 45 minutes with min assist through redirection of sustained attention.  Visual scanning 4 minutes visual scan each row and cancel out every 5 and 8    Trial one : 64/73= 87%    Patient and therapist rescanned line by line,  with therapist cueing to look clear to the left and scan clear to the right.     Sustain attention to cards and hit the table when card is a red card.      Scan a line of cards and select only the even cards    Sustained attention is slightly less than 30 minutes.  Last 10 patient unable to mentally process odds/evens to complete tasks.    Mod to max assist to scan secondary to decreased sustained visual attention.                                      Total assist secondary to fatigue.  (Visual and mental  fatigue    STG 2b: 12 weeks.  Patient will complete divided attention tasks with min assist to attend to multiple information required to make appropriate and timely decisions for ADLS.        STG 2c:  12 weeks. Patient will complete alternating attention tasks with min assist to encode information required to complete multiple tasks.         STG 3a: 12 weeks: Patient will increase labial strength to a 4+/5      Labial strengthening   Min assist     Goal met   STG 3b: 12 weeks.  Patient will increase lingual strength to a 4+/5 Lingual strengthening   Min assist     Goal met         Total Time of Visit:            10:40 - 11:23=43 minutes      ASSESSMENT/PLAN      ASSESSMENT: Patient requires the skills of a therapist to provide mod to maximum cueing to increase attention for  executive functioning skills to complete IADLs.   Progress: Patient able to identify deficits and identify how these deficits can affect IADLs.      PLAN: Continue plan of care     Progress Note Due Date:1/16/25  Recertification Due Date:3/14/25     SIGNATURE: Marisela Nuñez, SLP, [place] License #: [id number]                 Adult Outpatient Rehabiliation                                                                           [address]. [id number]  [phone] Office  [phone] Fax

## 2024-12-31 NOTE — THERAPY TREATMENT NOTE
Outpatient Physical Therapy Neuro Treatment Note   [facility]     Patient Name: Lauren Marie  : 1960  MRN: [id number]  Today's Date: 2024      Visit Date: 2024    Visit Dx:    ICD-10-CM ICD-9-CM   1. Cerebrovascular accident (CVA) due to embolism of right middle cerebral artery  I63.411 434.11   2. Left-sided weakness  R53.1 728.87       Patient Active Problem List   Diagnosis    Cigarette nicotine dependence without complication    Transient ischemic attack    Chronic back pain    Vertigo    Primary hypertension    CVA (cerebral vascular accident)    Cerebral aneurysm, nonruptured    Carotid artery stenosis with cerebral infarction    Stenosis of cerebral artery                        PT Assessment/Plan       Row Name 24 0945          PT Assessment    Functional Limitations Decreased safety during functional activities;Impaired gait;Limitations in functional capacity and performance  -RO     Impairments Balance;Cognition;Coordination;Endurance;Gait;Muscle strength  -RO     Assessment Comments Patient still needing moderate to max cues for correct performance of exercises.  -RO     Rehab Potential Good  -RO     Patient/caregiver participated in establishment of treatment plan and goals Yes  -RO     Patient would benefit from skilled therapy intervention Yes  -RO        PT Plan    PT Plan Comments continue per plan of care  -RO               User Key  (r) = Recorded By, (t) = Taken By, (c) = Cosigned By      Initials Name Provider Type    RO Radha Chaudhari, PT Physical Therapist                        OP Exercises       Row Name 24 0989             Subjective    Subjective Comments Patient states fatigued from yesterdays session  -RO         Subjective Pain    Able to rate subjective pain? yes  -RO      Pre-Treatment Pain Level 0  -RO      Post-Treatment Pain Level 0  -RO         Total Minutes    89635 - PT Therapeutic Exercise Minutes 30  -RO      34501 - PT Therapeutic  Activity Minutes 15  -RO         Exercise 1    Exercise Name 1 wrist curls 3 ways  -RO      Cueing 1 Verbal;Tactile;Demo  -RO      Sets 1 2lbs  -RO      Reps 1 x10 each direction  -RO      Time 1 Left  -RO         Exercise 2    Exercise Name 2 recumbent bike  -RO      Cueing 2 Verbal  -RO      Time 2 x4 min  -RO         Exercise 3    Exercise Name 3 UBE  -RO      Cueing 3 Tactile  -RO      Sets 3 x2 min forward  -RO      Reps 3 unable to do reverse  -RO         Exercise 4    Exercise Name 4 cross over stepping  -RO      Cueing 4 Verbal;Tactile;Demo  -RO      Sets 4 x20 ft each direction  -RO         Exercise 5    Exercise Name 5 tandem stepping  -RO      Cueing 5 Verbal;Tactile;Demo  -RO      Sets 5 30ft x1  -RO         Exercise 6    Exercise Name 6 bolster bridges  -RO      Cueing 6 Verbal;Tactile  -RO      Reps 6 x10 with 3 sec hold  -RO         Exercise 7    Exercise Name 7 pulleys into flexion  -RO      Cueing 7 Verbal;Tactile;Demo  -RO      Time 7 x2 min  -RO         Exercise 8    Exercise Name 8 hooklying alt UE/LE flexion  -RO      Cueing 8 Verbal;Tactile;Demo  -RO      Reps 8 x10 each side  -RO         Exercise 9    Exercise Name 9 standing hip abduction  -RO      Cueing 9 Verbal;Demo  -RO      Sets 9 at rail  -RO      Reps 9 x10 each LE  -RO      Time 9 2lb ankle weight  -RO         Exercise 10    Exercise Name 10 standing marching  -RO      Cueing 10 Verbal;Demo  -RO      Sets 10 at rail  -RO      Reps 10 x10 each LE  -RO      Time 10 2lb ankle weight  -RO         Exercise 11    Exercise Name 11 rows  -RO      Cueing 11 Verbal;Tactile;Demo  -RO      Sets 11 level 1  -RO      Reps 11 x10  -RO      Time 11 CGA of therapist  -RO         Exercise 12    Exercise Name 12 hooklying hip adduction/ball squeeze  -RO      Cueing 12 Verbal;Tactile  -RO      Reps 12 x10 with 3 sec hold  -RO         Exercise 13    Exercise Name 13 cervical stretches  -RO      Cueing 13 Verbal;Tactile  -RO      Time 13 x4 min  -RO                 User Key  (r) = Recorded By, (t) = Taken By, (c) = Cosigned By      Initials Name Provider Type    Radha Norton, PT Physical Therapist                                                   Time Calculation:   Timed Charges  49479 - PT Therapeutic Exercise Minutes: 30  16430 - PT Therapeutic Activity Minutes: 15  Total Minutes  Timed Charges Total Minutes: 45   Total Minutes: 45                 Radha Chaudhari PT  12/31/2024

## 2025-01-06 ENCOUNTER — APPOINTMENT (OUTPATIENT)
Facility: HOSPITAL | Age: 65
End: 2025-01-06
Payer: COMMERCIAL

## 2025-01-07 ENCOUNTER — APPOINTMENT (OUTPATIENT)
Facility: HOSPITAL | Age: 65
End: 2025-01-07
Payer: COMMERCIAL

## 2025-01-13 ENCOUNTER — HOSPITAL ENCOUNTER (OUTPATIENT)
Facility: HOSPITAL | Age: 65
Setting detail: THERAPIES SERIES
Discharge: HOME | End: 2025-01-13
Payer: COMMERCIAL

## 2025-01-13 DIAGNOSIS — R41.841 COGNITIVE COMMUNICATION DEFICIT: Primary | ICD-10-CM

## 2025-01-13 DIAGNOSIS — R53.1 LEFT-SIDED WEAKNESS: ICD-10-CM

## 2025-01-13 DIAGNOSIS — I63.411 CEREBROVASCULAR ACCIDENT (CVA) DUE TO EMBOLISM OF RIGHT MIDDLE CEREBRAL ARTERY: Primary | ICD-10-CM

## 2025-01-13 PROCEDURE — 97130 THER IVNTJ EA ADDL 15 MIN: CPT | Performed by: SPEECH-LANGUAGE PATHOLOGIST

## 2025-01-13 PROCEDURE — 97530 THERAPEUTIC ACTIVITIES: CPT | Performed by: PHYSICAL THERAPIST

## 2025-01-13 PROCEDURE — 97110 THERAPEUTIC EXERCISES: CPT | Performed by: PHYSICAL THERAPIST

## 2025-01-13 PROCEDURE — 97129 THER IVNTJ 1ST 15 MIN: CPT | Performed by: SPEECH-LANGUAGE PATHOLOGIST

## 2025-01-13 NOTE — THERAPY TREATMENT NOTE
Outpatient Physical Therapy Neuro Treatment Note   [facility]     Patient Name: Lauren Marie  : 1960  MRN: [id number]  Today's Date: 2025      Visit Date: 2025    Visit Dx:    ICD-10-CM ICD-9-CM   1. Cerebrovascular accident (CVA) due to embolism of right middle cerebral artery  I63.411 434.11   2. Left-sided weakness  R53.1 728.87       Patient Active Problem List   Diagnosis    Cigarette nicotine dependence without complication    Transient ischemic attack    Chronic back pain    Vertigo    Primary hypertension    CVA (cerebral vascular accident)    Cerebral aneurysm, nonruptured    Carotid artery stenosis with cerebral infarction    Stenosis of cerebral artery                        PT Assessment/Plan       Row Name 25 1000          PT Assessment    Functional Limitations Decreased safety during functional activities;Impaired gait;Limitations in functional capacity and performance  -RO     Impairments Balance;Cognition;Coordination;Endurance;Gait;Muscle strength  -RO     Assessment Comments Patient continues to progress; still needing cues for correct performance of exercises but down from max to moderate assist  -RO     Rehab Potential Good  -RO     Patient/caregiver participated in establishment of treatment plan and goals Yes  -RO     Patient would benefit from skilled therapy intervention Yes  -RO        PT Plan    PT Plan Comments continue to strengthen/stability/endurance  -RO               User Key  (r) = Recorded By, (t) = Taken By, (c) = Cosigned By      Initials Name Provider Type    RO Radha Chaudhari, PT Physical Therapist                        OP Exercises       Row Name 25 1000             Subjective    Subjective Comments Patient reports doing well except for left hand still weak/numb  -RO         Subjective Pain    Able to rate subjective pain? yes  -RO      Pre-Treatment Pain Level 0  -RO      Post-Treatment Pain Level 0  -RO         Total Minutes     55552 - PT Therapeutic Exercise Minutes 30  -RO      40239 - PT Therapeutic Activity Minutes 23  -RO         Exercise 1    Exercise Name 1 wrist curls 3 ways  -RO      Cueing 1 Verbal;Tactile;Demo  -RO      Sets 1 3lbs  -RO      Reps 1 x10 each direction  -RO      Time 1 Left  -RO         Exercise 2    Exercise Name 2 recumbent bike  -RO      Cueing 2 Verbal  -RO      Time 2 x4 min  -RO         Exercise 3    Exercise Name 3 UBE  -RO      Cueing 3 Tactile  -RO      Sets 3 x2 min forward  -RO      Reps 3 1 min reverse *uncomfortable  -RO         Exercise 4    Exercise Name 4 cross over stepping  -RO      Cueing 4 Verbal;Tactile;Demo  -RO      Sets 4 x20 ft each direction  -RO      Reps 4 CGA  -RO         Exercise 5    Exercise Name 5 tandem stepping  -RO      Cueing 5 Verbal;Tactile;Demo  -RO      Sets 5 30ft x1  -RO      Reps 5 SBA  -RO         Exercise 6    Exercise Name 6 bridges with hip add/ball squeeze  -RO      Cueing 6 Verbal;Tactile  -RO      Reps 6 x10 with 3 sec hold  -RO         Exercise 7    Exercise Name 7 pulleys into flexion  -RO      Cueing 7 Verbal;Tactile;Demo  -RO      Time 7 x2 min  -RO         Exercise 8    Exercise Name 8 hooklying alt UE/LE flexion  -RO      Cueing 8 Verbal;Tactile;Demo  -RO      Reps 8 x10 each side  -RO         Exercise 9    Exercise Name 9 standing hip abduction  -RO      Cueing 9 Verbal;Demo  -RO      Sets 9 at rail  -RO      Reps 9 2x10 each LE  -RO      Time 9 3lb ankle weight  -RO         Exercise 10    Exercise Name 10 standing marching  -RO      Cueing 10 Verbal;Demo  -RO      Sets 10 at rail  -RO      Reps 10 x10 each LE  -RO      Time 10 3lb ankle weight  -RO         Exercise 11    Exercise Name 11 rows  -RO      Cueing 11 Verbal;Tactile;Demo  -RO      Sets 11 level 1  -RO      Reps 11 x10  -RO      Time 11 CGA of therapist  -RO         Exercise 12    Exercise Name 12 lat pull downs  -RO      Cueing 12 Verbal;Tactile;Demo  -RO      Reps 12 x10  -RO      Time 12  level1  -RO         Exercise 13    Exercise Name 13 standing heel/toe raises  -RO      Cueing 13 Verbal;Demo  -RO      Sets 13 at rail  -RO      Reps 13 x10 B LE  -RO      Time 13 3lb ankle weights  -RO         Exercise 14    Exercise Name 14 slant board calf stretch  -RO      Cueing 14 Verbal;Tactile;Demo  -RO      Sets 14 3x20 sec hold  -RO         Exercise 15    Exercise Name 15 standing shoulder abduction  -RO      Cueing 15 Verbal;Demo;Tactile  -RO      Sets 15 x10  -RO         Exercise 16    Exercise Name 16 standing alt UE/LE flexion  -RO      Cueing 16 Verbal;Tactile;Demo  -RO      Sets 16 x10 reciprocal  -RO         Exercise 17    Exercise Name 17 Single leg stance  -RO      Cueing 17 Tactile;Verbal;Demo  -RO      Sets 17 2 x5 sec each LE  -RO         Exercise 18    Exercise Name 18 side stepping  -RO      Cueing 18 Verbal;Demo  -RO      Sets 18 20ft each direction  -RO         Exercise 19    Exercise Name 19 L wrist/finger stretches  -RO      Cueing 19 Verbal;Tactile;Demo  -RO      Time 19 x2 min  -RO                User Key  (r) = Recorded By, (t) = Taken By, (c) = Cosigned By      Initials Name Provider Type    RO Radha Chaudhari, PT Physical Therapist                                                   Time Calculation:   Timed Charges  50150 - PT Therapeutic Exercise Minutes: 30  57586 - PT Therapeutic Activity Minutes: 23  Total Minutes  Timed Charges Total Minutes: 53   Total Minutes: 53                 Radha Chaudhari PT  1/13/2025

## 2025-01-13 NOTE — THERAPY TREATMENT NOTE
Outpatient Adult Speech Language Therapy           Treatment Note    Patient: Lauren Marie                                                                                     Visit Date: 2025  :     1960    Referring practitioner:    Andrea Vega MD  Date of Initial Visit:          Type: THERAPY  Episode: Right MCA CVA; Facial droop    Patient seen for 5 sessions    Visit Diagnoses:    ICD-10-CM ICD-9-CM   1. Cognitive communication deficit  R41.841 799.52     SUBJECTIVE   Patient requires the skills of a therapist to increase executive functioning through cognitive therapy for safe and accurate completion of IADLS.       Education:  POC  OBJECTIVE   GOALS     GOALS ACTIVITY PERFORMED TRIALS COMPLETED LEVEL OF CUEING REQUIRED   LTG 1: 12 weeks: Patient will increase Functional Communication Measure level to 6/7 for thought organization and problem solving skills for safe completion of IADL tasks.                            STATUS:  New                           STG 1a: 12 weeks:  Patient will complete mod complex thought organization tasks (calendar, scheduling, medicine managment, checkbook registrar,  etc)  with min to mod assist to increase organization to solve moderately complex ADL problems.          STG 1b:12 weeks: Patient will complete mod complex  reasoning tasks (money management, counting change, time managment, deductive reasoning tasks, etc.) with min to mod assist to use appropriate strategies for ADLs.  Basic Math problems (addition, subtraction, multiplication etx.) for time, bill paying, recipe math, etc.  3/10 Max assist secondary to decreased thought organization and reasoning skills.    STG 2a: 12 weeks:  Patient will sustain attention to basic tasks for 45 minutes with min assist through redirection of sustained attention. Visual scanning Process of elimination- scanning rows and crossing out words  according to directions.    20 minutes       Mod assist to visually scan left to right.     STG 2b: 12 weeks.  Patient will complete divided attention tasks with min assist to attend to multiple information required to make appropriate and timely decisions for ADLS.  Visually scan arrows and move hand the opposite direction  Trial one:  Total assist to complete task    Trial two: 25/35 accuracy and 6 minutes and 2 seconds to complete  Max assist secondary to decreased thought organization and reasoning.    STG 2c:  12 weeks. Patient will complete alternating attention tasks with min assist to encode information required to complete multiple tasks.         STG 3a: 12 weeks: Patient will increase labial strength to a 4+/5      Labial strengthening   Min assist     Goal met   STG 3b: 12 weeks.  Patient will increase lingual strength to a 4+/5 Lingual strengthening   Min assist     Goal met         Total Time of Visit:            10:55- 11:55=60 minutes      ASSESSMENT/PLAN      ASSESSMENT: Patient requires the skills of a therapist to provide mod to maximum cueing to increase attention for  executive functioning skills to complete IADLs.   Progress:  Beginning to improve on scanning left to right.   PLAN: Continue plan of care     Progress Note Due Date:1/16/25  Recertification Due Date:3/14/25     SIGNATURE: Marisela Nuñez, SLP, [place] License #: [id number]  Electronically Signed on 1/13/2025            Adult Outpatient Rehabiliation

## 2025-01-14 ENCOUNTER — HOSPITAL ENCOUNTER (OUTPATIENT)
Facility: HOSPITAL | Age: 65
Setting detail: THERAPIES SERIES
Discharge: HOME | End: 2025-01-14
Payer: COMMERCIAL

## 2025-01-14 ENCOUNTER — TELEPHONE (OUTPATIENT)
Dept: NEUROSURGERY | Facility: CLINIC | Age: 65
End: 2025-01-14
Payer: COMMERCIAL

## 2025-01-14 ENCOUNTER — TELEPHONE (OUTPATIENT)
Dept: FAMILY MEDICINE CLINIC | Facility: CLINIC | Age: 65
End: 2025-01-14
Payer: COMMERCIAL

## 2025-01-14 DIAGNOSIS — R06.02 SHORTNESS OF BREATH: ICD-10-CM

## 2025-01-14 DIAGNOSIS — R53.1 LEFT-SIDED WEAKNESS: ICD-10-CM

## 2025-01-14 DIAGNOSIS — I63.411 CEREBROVASCULAR ACCIDENT (CVA) DUE TO EMBOLISM OF RIGHT MIDDLE CEREBRAL ARTERY: Primary | ICD-10-CM

## 2025-01-14 DIAGNOSIS — R41.841 COGNITIVE COMMUNICATION DEFICIT: Primary | ICD-10-CM

## 2025-01-14 PROCEDURE — 97129 THER IVNTJ 1ST 15 MIN: CPT | Performed by: SPEECH-LANGUAGE PATHOLOGIST

## 2025-01-14 PROCEDURE — 97130 THER IVNTJ EA ADDL 15 MIN: CPT | Performed by: SPEECH-LANGUAGE PATHOLOGIST

## 2025-01-14 PROCEDURE — 97530 THERAPEUTIC ACTIVITIES: CPT | Performed by: PHYSICAL THERAPIST

## 2025-01-14 PROCEDURE — 97110 THERAPEUTIC EXERCISES: CPT | Performed by: PHYSICAL THERAPIST

## 2025-01-14 NOTE — THERAPY TREATMENT NOTE
Outpatient Adult Speech Language Therapy           Treatment Note    Patient: Lauren Marie                                                                                     Visit Date: 2025  :     1960    Referring practitioner:    Andrea Vega MD  Date of Initial Visit:          Type: THERAPY  Episode: Right MCA CVA; Facial droop    Patient seen for 6 sessions    Visit Diagnoses:    ICD-10-CM ICD-9-CM   1. Cognitive communication deficit  R41.841 799.52     SUBJECTIVE   Patient requires the skills of a therapist to increase executive functioning through cognitive therapy for safe and accurate completion of IADLS.       Education:  basic math skills needed for ADLs  OBJECTIVE   GOALS     GOALS ACTIVITY PERFORMED TRIALS COMPLETED LEVEL OF CUEING REQUIRED   LTG 1: 12 weeks: Patient will increase Functional Communication Measure level to 6/7 for thought organization and problem solving skills for safe completion of IADL tasks.                            STATUS:  New                           STG 1a: 12 weeks:  Patient will complete mod complex thought organization tasks (calendar, scheduling, medicine managment, checkbook registrar,  etc)  with min to mod assist to increase organization to solve moderately complex ADL problems.          STG 1b:12 weeks: Patient will complete mod complex  reasoning tasks (money management, counting change, time managment, deductive reasoning tasks, etc.) with min to mod assist to use appropriate strategies for ADLs.  Basic Math  -Telling time 7/10 70%  --Money-6/10    Making change- Patient educated on how to count change and make change.         Telling time on an analogue clock-6/10 Mod assist to use actual change and a clock to practice   STG 2a: 12 weeks:  Patient will sustain attention to basic tasks for 45 minutes with min assist through redirection of sustained attention.       STG 2b:  12 weeks.  Patient will complete divided attention tasks with min assist to attend to multiple information required to make appropriate and timely decisions for ADLS.       STG 2c:  12 weeks. Patient will complete alternating attention tasks with min assist to encode information required to complete multiple tasks.         STG 3a: 12 weeks: Patient will increase labial strength to a 4+/5      Labial strengthening   Min assist     Goal met   STG 3b: 12 weeks.  Patient will increase lingual strength to a 4+/5 Lingual strengthening   Min assist     Goal met         Total Time of Visit:            11:00- 11:55 = 55 minutes      ASSESSMENT/PLAN      ASSESSMENT: Patient requires the skills of a therapist to provide mod cueing to increase problem solving skills to promote   executive functioning needed to complete IADLs.   Progress:    PLAN: Continue plan of care     Progress Note Due Date:1/16/25  Recertification Due Date:3/14/25     SIGNATURE: MARC [place], [place] License #: [id number]  Electronically Signed on 1/14/2025            Adult Outpatient Rehabiliation

## 2025-01-14 NOTE — TELEPHONE ENCOUNTER
Patient is having cerebral angiogram this Friday 17th. She was told to hold her plavix 3 days prior however she can take her aspirin. Her  wanted to let us know that she took her plavix today so the 3rd day off of it would be the day of procedure. I advised that she not take her plavix after today. He just wanted to make sure it was ok that she took it today.

## 2025-01-14 NOTE — THERAPY TREATMENT NOTE
Outpatient Physical Therapy Neuro Treatment Note   [facility]     Patient Name: Lauren Marie  : 1960  MRN: [id number]  Today's Date: 2025      Visit Date: 2025    Visit Dx:    ICD-10-CM ICD-9-CM   1. Cerebrovascular accident (CVA) due to embolism of right middle cerebral artery  I63.411 434.11   2. Left-sided weakness  R53.1 728.87       Patient Active Problem List   Diagnosis    Cigarette nicotine dependence without complication    Transient ischemic attack    Chronic back pain    Vertigo    Primary hypertension    CVA (cerebral vascular accident)    Cerebral aneurysm, nonruptured    Carotid artery stenosis with cerebral infarction    Stenosis of cerebral artery                        PT Assessment/Plan       Row Name 25 1000          PT Assessment    Functional Limitations Decreased safety during functional activities;Impaired gait;Limitations in functional capacity and performance  -RO     Impairments Balance;Cognition;Coordination;Endurance;Gait;Muscle strength  -RO     Assessment Comments Patient able to tolerate more left shoulder/arm activity today, but still needing cues.  -RO     Rehab Potential Good  -RO     Patient/caregiver participated in establishment of treatment plan and goals Yes  -RO     Patient would benefit from skilled therapy intervention Yes  -RO        PT Plan    PT Plan Comments PT progress note next session  -RO               User Key  (r) = Recorded By, (t) = Taken By, (c) = Cosigned By      Initials Name Provider Type    RO Radha Chaudhari, PT Physical Therapist                        OP Exercises       Row Name 25 1000             Subjective    Subjective Comments Patient reports working on left hand mobility at home/squeezing ball  -RO         Subjective Pain    Able to rate subjective pain? yes  -RO      Pre-Treatment Pain Level 0  -RO      Post-Treatment Pain Level 0  -RO         Total Minutes    47159 - PT Therapeutic Exercise Minutes 30  -RO       37638 - PT Therapeutic Activity Minutes 24  -RO         Exercise 1    Exercise Name 1 wrist curls 3 ways  -RO      Cueing 1 Verbal;Tactile;Demo  -RO      Sets 1 3lbs  -RO      Reps 1 x10 each direction  -RO      Time 1 Left  -RO         Exercise 2    Exercise Name 2 recumbent bike  -RO      Cueing 2 Verbal  -RO      Time 2 x4 min  -RO         Exercise 3    Exercise Name 3 UBE  -RO      Cueing 3 Tactile  -RO      Sets 3 x2 min forward  -RO      Reps 3 x2 min reverse  -RO         Exercise 4    Exercise Name 4 cross over stepping  -RO      Cueing 4 Verbal;Tactile;Demo  -RO      Sets 4 x20 ft each direction  -RO      Reps 4 CGA  -RO         Exercise 5    Exercise Name 5 tandem stepping  -RO      Cueing 5 Verbal;Tactile;Demo  -RO      Sets 5 30ft x1  -RO      Reps 5 SBA  -RO         Exercise 6    Exercise Name 6 bridges with hip add/ball squeeze  -RO      Cueing 6 Verbal;Tactile  -RO      Reps 6 x10 with 3 sec hold  -RO         Exercise 7    Exercise Name 7 pulleys into flexion  -RO      Cueing 7 Verbal;Tactile;Demo  -RO      Time 7 x2 min  -RO         Exercise 8    Exercise Name 8 hooklying alt UE/LE flexion  -RO      Cueing 8 Verbal;Tactile;Demo  -RO      Reps 8 x10 each side  -RO         Exercise 9    Exercise Name 9 standing hip abduction  -RO      Cueing 9 Verbal;Demo  -RO      Sets 9 at rail  -RO      Reps 9 2x10 each LE  -RO      Time 9 3lb ankle weight  -RO         Exercise 10    Exercise Name 10 standing marching  -RO      Cueing 10 Verbal;Demo  -RO      Sets 10 at rail  -RO      Reps 10 x10 each LE  -RO      Time 10 3lb ankle weight  -RO         Exercise 11    Exercise Name 11 rows  -RO      Cueing 11 Verbal;Tactile;Demo  -RO      Sets 11 level 1  -RO      Reps 11 x10  -RO      Time 11 CGA of therapist  -RO         Exercise 12    Exercise Name 12 lat pull downs  -RO      Cueing 12 Verbal;Tactile;Demo  -RO      Reps 12 x10  -RO      Time 12 level1  -RO         Exercise 13    Exercise Name 13 standing  heel/toe raises  -RO      Cueing 13 Verbal;Demo  -RO      Sets 13 at rail  -RO      Reps 13 x20 B LE  -RO      Time 13 3lb ankle weights  -RO         Exercise 14    Exercise Name 14 slant board calf stretch  -RO      Cueing 14 Verbal;Tactile;Demo  -RO      Sets 14 3x20 sec hold  -RO         Exercise 15    Exercise Name 15 standing shoulder abduction  -RO      Cueing 15 Verbal;Demo;Tactile  -RO      Sets 15 x10  -RO         Exercise 16    Exercise Name 16 standing alt UE/LE flexion  -RO      Cueing 16 Verbal;Tactile;Demo  -RO      Sets 16 x10 reciprocal  -RO         Exercise 17    Exercise Name 17 hooklying hip abduction  -RO      Cueing 17 Verbal;Tactile  -RO      Sets 17 blue band  -RO      Reps 17 x20  -RO         Exercise 18    Exercise Name 18 --  -RO      Cueing 18 --  -RO      Sets 18 --  -RO         Exercise 19    Exercise Name 19 --  -RO      Cueing 19 --  -RO      Time 19 --  -RO                User Key  (r) = Recorded By, (t) = Taken By, (c) = Cosigned By      Initials Name Provider Type    RO Radha Chaudhari, PT Physical Therapist                                                   Time Calculation:   Timed Charges  08081 - PT Therapeutic Exercise Minutes: 30  92440 - PT Therapeutic Activity Minutes: 24  Total Minutes  Timed Charges Total Minutes: 54   Total Minutes: 54                 Radha Chaudhari PT  1/14/2025

## 2025-01-15 RX ORDER — ALBUTEROL SULFATE 90 UG/1
2 INHALANT RESPIRATORY (INHALATION) EVERY 4 HOURS PRN
Qty: 8 G | Refills: 0 | Status: SHIPPED | OUTPATIENT
Start: 2025-01-15

## 2025-01-16 NOTE — PROGRESS NOTES
01/17/25 0001   Pre-Procedure Phone Call   Procedure Time Verified Yes   Arrival Time 0700   Procedure Location Verified Yes   Medical History Reviewed No   NPO Status Reinforced Yes   Ride and Caregiver Arranged Yes   Patient Knows to Bring Current Medications Yes   Bring Outside Films Requested No

## 2025-01-17 ENCOUNTER — ANESTHESIA (OUTPATIENT)
Dept: INTERVENTIONAL RADIOLOGY/VASCULAR | Facility: HOSPITAL | Age: 65
End: 2025-01-17
Payer: COMMERCIAL

## 2025-01-17 ENCOUNTER — HOSPITAL ENCOUNTER (OUTPATIENT)
Dept: INTERVENTIONAL RADIOLOGY/VASCULAR | Facility: HOSPITAL | Age: 65
Discharge: HOME | End: 2025-01-17
Payer: COMMERCIAL

## 2025-01-17 VITALS
BODY MASS INDEX: 25.55 KG/M2 | HEART RATE: 64 BPM | SYSTOLIC BLOOD PRESSURE: 130 MMHG | RESPIRATION RATE: 15 BRPM | DIASTOLIC BLOOD PRESSURE: 85 MMHG | WEIGHT: 159 LBS | OXYGEN SATURATION: 97 % | HEIGHT: 66 IN | TEMPERATURE: 98 F

## 2025-01-17 DIAGNOSIS — I67.1 CEREBRAL ANEURYSM, NONRUPTURED: ICD-10-CM

## 2025-01-17 PROCEDURE — 76377 3D RENDER W/INTRP POSTPROCES: CPT

## 2025-01-17 PROCEDURE — C1769 GUIDE WIRE: HCPCS

## 2025-01-17 PROCEDURE — 25010000002 PROPOFOL 10 MG/ML EMULSION: Performed by: NURSE ANESTHETIST, CERTIFIED REGISTERED

## 2025-01-17 PROCEDURE — C1760 CLOSURE DEV, VASC: HCPCS

## 2025-01-17 PROCEDURE — C1894 INTRO/SHEATH, NON-LASER: HCPCS

## 2025-01-17 PROCEDURE — 25510000002 IODIXANOL PER 1 ML: Performed by: STUDENT IN AN ORGANIZED HEALTH CARE EDUCATION/TRAINING PROGRAM

## 2025-01-17 PROCEDURE — 25810000003 LACTATED RINGERS PER 1000 ML: Performed by: ANESTHESIOLOGY

## 2025-01-17 PROCEDURE — 25010000002 LIDOCAINE 2% SOLUTION: Performed by: NURSE ANESTHETIST, CERTIFIED REGISTERED

## 2025-01-17 PROCEDURE — 25010000002 MIDAZOLAM PER 1 MG: Performed by: NURSE ANESTHETIST, CERTIFIED REGISTERED

## 2025-01-17 PROCEDURE — 25010000002 FENTANYL CITRATE (PF) 50 MCG/ML SOLUTION: Performed by: ANESTHESIOLOGY

## 2025-01-17 PROCEDURE — 25010000002 HEPARIN (PORCINE) PER 1000 UNITS: Performed by: STUDENT IN AN ORGANIZED HEALTH CARE EDUCATION/TRAINING PROGRAM

## 2025-01-17 PROCEDURE — 25010000002 LIDOCAINE 1 % SOLUTION: Performed by: STUDENT IN AN ORGANIZED HEALTH CARE EDUCATION/TRAINING PROGRAM

## 2025-01-17 RX ORDER — HYDROCODONE BITARTRATE AND ACETAMINOPHEN 7.5; 325 MG/1; MG/1
1 TABLET ORAL EVERY 4 HOURS PRN
Status: DISCONTINUED | OUTPATIENT
Start: 2025-01-17 | End: 2025-01-18 | Stop reason: HOSPADM

## 2025-01-17 RX ORDER — LIDOCAINE HYDROCHLORIDE 10 MG/ML
0.5 INJECTION, SOLUTION INFILTRATION; PERINEURAL ONCE AS NEEDED
Status: DISCONTINUED | OUTPATIENT
Start: 2025-01-17 | End: 2025-01-18 | Stop reason: HOSPADM

## 2025-01-17 RX ORDER — NALOXONE HCL 0.4 MG/ML
0.2 VIAL (ML) INJECTION AS NEEDED
Status: DISCONTINUED | OUTPATIENT
Start: 2025-01-17 | End: 2025-01-18 | Stop reason: HOSPADM

## 2025-01-17 RX ORDER — MIDAZOLAM HYDROCHLORIDE 1 MG/ML
INJECTION, SOLUTION INTRAMUSCULAR; INTRAVENOUS AS NEEDED
Status: DISCONTINUED | OUTPATIENT
Start: 2025-01-17 | End: 2025-01-17 | Stop reason: SURG

## 2025-01-17 RX ORDER — LIDOCAINE HYDROCHLORIDE 10 MG/ML
INJECTION, SOLUTION INFILTRATION; PERINEURAL AS NEEDED
Status: COMPLETED | OUTPATIENT
Start: 2025-01-17 | End: 2025-01-17

## 2025-01-17 RX ORDER — HYDROCODONE BITARTRATE AND ACETAMINOPHEN 5; 325 MG/1; MG/1
1 TABLET ORAL ONCE AS NEEDED
Status: DISCONTINUED | OUTPATIENT
Start: 2025-01-17 | End: 2025-01-18 | Stop reason: HOSPADM

## 2025-01-17 RX ORDER — FENTANYL CITRATE 50 UG/ML
50 INJECTION, SOLUTION INTRAMUSCULAR; INTRAVENOUS ONCE AS NEEDED
Status: COMPLETED | OUTPATIENT
Start: 2025-01-17 | End: 2025-01-17

## 2025-01-17 RX ORDER — DIPHENHYDRAMINE HYDROCHLORIDE 50 MG/ML
12.5 INJECTION INTRAMUSCULAR; INTRAVENOUS
Status: DISCONTINUED | OUTPATIENT
Start: 2025-01-17 | End: 2025-01-18 | Stop reason: HOSPADM

## 2025-01-17 RX ORDER — FLUMAZENIL 0.1 MG/ML
0.2 INJECTION INTRAVENOUS AS NEEDED
Status: DISCONTINUED | OUTPATIENT
Start: 2025-01-17 | End: 2025-01-18 | Stop reason: HOSPADM

## 2025-01-17 RX ORDER — PROMETHAZINE HYDROCHLORIDE 25 MG/1
25 SUPPOSITORY RECTAL ONCE AS NEEDED
Status: DISCONTINUED | OUTPATIENT
Start: 2025-01-17 | End: 2025-01-18 | Stop reason: HOSPADM

## 2025-01-17 RX ORDER — SODIUM CHLORIDE 0.9 % (FLUSH) 0.9 %
3-10 SYRINGE (ML) INJECTION AS NEEDED
Status: DISCONTINUED | OUTPATIENT
Start: 2025-01-17 | End: 2025-01-18 | Stop reason: HOSPADM

## 2025-01-17 RX ORDER — DEXMEDETOMIDINE HYDROCHLORIDE 100 UG/ML
INJECTION, SOLUTION INTRAVENOUS AS NEEDED
Status: DISCONTINUED | OUTPATIENT
Start: 2025-01-17 | End: 2025-01-17 | Stop reason: SURG

## 2025-01-17 RX ORDER — IPRATROPIUM BROMIDE AND ALBUTEROL SULFATE 2.5; .5 MG/3ML; MG/3ML
3 SOLUTION RESPIRATORY (INHALATION) ONCE AS NEEDED
Status: DISCONTINUED | OUTPATIENT
Start: 2025-01-17 | End: 2025-01-18 | Stop reason: HOSPADM

## 2025-01-17 RX ORDER — ONDANSETRON 2 MG/ML
4 INJECTION INTRAMUSCULAR; INTRAVENOUS ONCE AS NEEDED
Status: DISCONTINUED | OUTPATIENT
Start: 2025-01-17 | End: 2025-01-18 | Stop reason: HOSPADM

## 2025-01-17 RX ORDER — SODIUM CHLORIDE, SODIUM LACTATE, POTASSIUM CHLORIDE, CALCIUM CHLORIDE 600; 310; 30; 20 MG/100ML; MG/100ML; MG/100ML; MG/100ML
9 INJECTION, SOLUTION INTRAVENOUS CONTINUOUS
Status: DISCONTINUED | OUTPATIENT
Start: 2025-01-17 | End: 2025-01-18 | Stop reason: HOSPADM

## 2025-01-17 RX ORDER — PROPOFOL 10 MG/ML
VIAL (ML) INTRAVENOUS AS NEEDED
Status: DISCONTINUED | OUTPATIENT
Start: 2025-01-17 | End: 2025-01-17 | Stop reason: SURG

## 2025-01-17 RX ORDER — LABETALOL HYDROCHLORIDE 5 MG/ML
5 INJECTION, SOLUTION INTRAVENOUS
Status: DISCONTINUED | OUTPATIENT
Start: 2025-01-17 | End: 2025-01-18 | Stop reason: HOSPADM

## 2025-01-17 RX ORDER — PROMETHAZINE HYDROCHLORIDE 25 MG/1
25 TABLET ORAL ONCE AS NEEDED
Status: DISCONTINUED | OUTPATIENT
Start: 2025-01-17 | End: 2025-01-18 | Stop reason: HOSPADM

## 2025-01-17 RX ORDER — EPHEDRINE SULFATE 50 MG/ML
5 INJECTION, SOLUTION INTRAVENOUS ONCE AS NEEDED
Status: DISCONTINUED | OUTPATIENT
Start: 2025-01-17 | End: 2025-01-18 | Stop reason: HOSPADM

## 2025-01-17 RX ORDER — IODIXANOL 320 MG/ML
400 INJECTION, SOLUTION INTRAVASCULAR
Status: COMPLETED | OUTPATIENT
Start: 2025-01-17 | End: 2025-01-17

## 2025-01-17 RX ORDER — MIDAZOLAM HYDROCHLORIDE 1 MG/ML
1 INJECTION, SOLUTION INTRAMUSCULAR; INTRAVENOUS
Status: DISCONTINUED | OUTPATIENT
Start: 2025-01-17 | End: 2025-01-18 | Stop reason: HOSPADM

## 2025-01-17 RX ORDER — ATROPINE SULFATE 0.4 MG/ML
0.4 INJECTION, SOLUTION INTRAMUSCULAR; INTRAVENOUS; SUBCUTANEOUS ONCE AS NEEDED
Status: DISCONTINUED | OUTPATIENT
Start: 2025-01-17 | End: 2025-01-18 | Stop reason: HOSPADM

## 2025-01-17 RX ORDER — HYDRALAZINE HYDROCHLORIDE 20 MG/ML
5 INJECTION INTRAMUSCULAR; INTRAVENOUS
Status: DISCONTINUED | OUTPATIENT
Start: 2025-01-17 | End: 2025-01-18 | Stop reason: HOSPADM

## 2025-01-17 RX ORDER — LIDOCAINE HYDROCHLORIDE 20 MG/ML
INJECTION, SOLUTION INFILTRATION; PERINEURAL AS NEEDED
Status: DISCONTINUED | OUTPATIENT
Start: 2025-01-17 | End: 2025-01-17 | Stop reason: SURG

## 2025-01-17 RX ORDER — SODIUM CHLORIDE 0.9 % (FLUSH) 0.9 %
3 SYRINGE (ML) INJECTION EVERY 12 HOURS SCHEDULED
Status: DISCONTINUED | OUTPATIENT
Start: 2025-01-17 | End: 2025-01-18 | Stop reason: HOSPADM

## 2025-01-17 RX ORDER — SODIUM CHLORIDE 9 MG/ML
75 INJECTION, SOLUTION INTRAVENOUS CONTINUOUS
Status: ACTIVE | OUTPATIENT
Start: 2025-01-17 | End: 2025-01-17

## 2025-01-17 RX ORDER — FAMOTIDINE 10 MG/ML
20 INJECTION, SOLUTION INTRAVENOUS ONCE
Status: COMPLETED | OUTPATIENT
Start: 2025-01-17 | End: 2025-01-17

## 2025-01-17 RX ADMIN — FAMOTIDINE 20 MG: 10 INJECTION INTRAVENOUS at 08:12

## 2025-01-17 RX ADMIN — DEXMEDETOMIDINE 10 MCG: 100 INJECTION, SOLUTION INTRAVENOUS at 09:25

## 2025-01-17 RX ADMIN — Medication 3 ML: at 08:12

## 2025-01-17 RX ADMIN — DEXMEDETOMIDINE 5 MCG: 100 INJECTION, SOLUTION INTRAVENOUS at 08:57

## 2025-01-17 RX ADMIN — HEPARIN SODIUM: 1000 INJECTION INTRAVENOUS; SUBCUTANEOUS at 09:03

## 2025-01-17 RX ADMIN — MIDAZOLAM 0.5 MG: 1 INJECTION INTRAMUSCULAR; INTRAVENOUS at 09:46

## 2025-01-17 RX ADMIN — SODIUM CHLORIDE, POTASSIUM CHLORIDE, SODIUM LACTATE AND CALCIUM CHLORIDE: 600; 310; 30; 20 INJECTION, SOLUTION INTRAVENOUS at 08:36

## 2025-01-17 RX ADMIN — LIDOCAINE HYDROCHLORIDE 10 ML: 10 INJECTION, SOLUTION INFILTRATION; PERINEURAL at 09:00

## 2025-01-17 RX ADMIN — PROPOFOL 125 MCG/KG/MIN: 10 INJECTION, EMULSION INTRAVENOUS at 08:43

## 2025-01-17 RX ADMIN — IODIXANOL 296 ML: 320 INJECTION, SOLUTION INTRAVASCULAR at 10:46

## 2025-01-17 RX ADMIN — HEPARIN SODIUM: 1000 INJECTION INTRAVENOUS; SUBCUTANEOUS at 09:02

## 2025-01-17 RX ADMIN — PROPOFOL 80 MG: 10 INJECTION, EMULSION INTRAVENOUS at 08:43

## 2025-01-17 RX ADMIN — LIDOCAINE HYDROCHLORIDE 50 MG: 20 INJECTION, SOLUTION INFILTRATION; PERINEURAL at 08:43

## 2025-01-17 RX ADMIN — HEPARIN SODIUM: 1000 INJECTION INTRAVENOUS; SUBCUTANEOUS at 09:55

## 2025-01-17 RX ADMIN — FENTANYL CITRATE 50 MCG: 50 INJECTION, SOLUTION INTRAMUSCULAR; INTRAVENOUS at 10:22

## 2025-01-17 NOTE — ANESTHESIA POSTPROCEDURE EVALUATION
"Patient: Lauren Marie    Procedure Summary       Date: 01/17/25 Room / Location: [facility] INTERVENTIONAL    Anesthesia Start: 0837 Anesthesia Stop: 1018    Procedure: IR ARCH AND 3 VESSEL HEAD Diagnosis:       Cerebral aneurysm, nonruptured      (aneurysm)    Scheduled Providers:  Provider: Bang Francis DO    Anesthesia Type: general ASA Status: 3            Anesthesia Type: general    Vitals  Vitals Value Taken Time   /87 01/17/25 1215   Temp 36.7 °C (98 °F) 01/17/25 1016   Pulse 60 01/17/25 1230   Resp 14 01/17/25 1100   SpO2 100 % 01/17/25 1230   Vitals shown include unfiled device data.        Post Anesthesia Care and Evaluation    Patient location during evaluation: bedside  Patient participation: complete - patient participated  Level of consciousness: awake and alert  Pain management: adequate    Airway patency: patent  Anesthetic complications: No anesthetic complications  PONV Status: controlled  Cardiovascular status: acceptable and hemodynamically stable  Respiratory status: acceptable, spontaneous ventilation and nonlabored ventilation  Hydration status: acceptable    Comments: /85 (BP Location: Left arm, Patient Position: Lying)   Pulse 64   Temp 36.7 °C (98 °F) (Oral)   Resp 15   Ht 167.6 cm (66\")   Wt 72.1 kg (159 lb)   LMP  (LMP Unknown)   SpO2 97%   BMI 25.66 kg/m²       "

## 2025-01-17 NOTE — ANESTHESIA PREPROCEDURE EVALUATION
Anesthesia Evaluation     Patient summary reviewed   no history of anesthetic complications:   NPO Solid Status: > 8 hours  NPO Liquid Status: > 2 hours           Airway   Mallampati: II  TM distance: >3 FB  Neck ROM: full  No difficulty expected  Dental      Pulmonary     breath sounds clear to auscultation  (+) a smoker Former,  (-) shortness of breath, recent URI  Cardiovascular     ECG reviewed  Rhythm: regular  Rate: normal    (+) hypertension,  carotid artery disease  (-) past MI, dysrhythmias, angina    ROS comment: 12/2024 ECHO- Narrative  ·  Left ventricular systolic function is normal. Estimated left  ventricular EF = 55%  ·  Left atrial appendage was free of thrombus with normal velocities  ·  There is mild plaque in the ascending aorta present.  ·  No obvious PFO/ASD seen on color-flow patient with saline test done  showing no obvious shunting but poor opacification and bubble filling  possibly due to poor IV site      Neuro/Psych  (+) TIA, CVA (11/2024, R Frontal (L facial weakness); aneurysm), dizziness/light headedness (Vertigo)  (-) seizures  GI/Hepatic/Renal/Endo    (-)  obesityRenal disease: Cr 0.95.    Musculoskeletal     Abdominal    Substance History      OB/GYN          Other                      Anesthesia Plan    ASA 3     MAC     (Diagnostic only. MAC requested per surgeon.    MAC anesthesia discussed with patient and/or patient representative. Risks (including but not limited to intra-op awareness), benefits, and alternatives were discussed. Understanding was voiced with an agreement to proceed with a MAC technique and General as a backup option. )    Anesthetic plan, risks, benefits, and alternatives have been provided, discussed and informed consent has been obtained with: patient.    Use of blood products discussed with patient .        CODE STATUS:

## 2025-01-20 ENCOUNTER — TELEPHONE (OUTPATIENT)
Dept: NEUROSURGERY | Facility: CLINIC | Age: 65
End: 2025-01-20
Payer: COMMERCIAL

## 2025-01-20 ENCOUNTER — APPOINTMENT (OUTPATIENT)
Facility: HOSPITAL | Age: 65
End: 2025-01-20
Payer: COMMERCIAL

## 2025-01-20 ENCOUNTER — CLINICAL SUPPORT (OUTPATIENT)
Dept: FAMILY MEDICINE CLINIC | Facility: CLINIC | Age: 65
End: 2025-01-20
Payer: COMMERCIAL

## 2025-01-20 DIAGNOSIS — I10 PRIMARY HYPERTENSION: ICD-10-CM

## 2025-01-20 DIAGNOSIS — Z48.01 DRESSING CHANGE OR REMOVAL, SURGICAL WOUND: Primary | ICD-10-CM

## 2025-01-20 LAB — CREAT BLDA-MCNC: 0.7 MG/DL (ref 0.6–1.3)

## 2025-01-20 RX ORDER — HYDROCHLOROTHIAZIDE 12.5 MG/1
12.5 TABLET ORAL DAILY
Qty: 30 TABLET | Refills: 0 | Status: SHIPPED | OUTPATIENT
Start: 2025-01-20

## 2025-01-20 NOTE — PROGRESS NOTES
Pt had cerebral angioplasty on January 17th and came in today for dressing change. Crystal spoke to Dr Noble's office, the surgeon, to confirm any special instructions for bandage change. Instructions were to remove bandage carefully, watch for any bleeding, and do not replace and leave to open air. Also stated pt can shower but not to scrub area and just let the soapy water run over the area.     Using alcohol pads, pt bandage was removed with ease and no adverse effects. Pt wound was pinpoint site with no signs of infection or irritation, and no bleeding. Left undressed as instructed by surgeon's office.     Explained after care instructions to pt and she verbalized understanding. Reminded pt of upcoming follow up with their office.

## 2025-01-20 NOTE — TELEPHONE ENCOUNTER
Patient needing refill, checked AVS from recent surgery and medication was not discontinued, has follow up with Dr Noble on 01/27

## 2025-01-20 NOTE — TELEPHONE ENCOUNTER
Crystal from pt pcp called to obtain verbal auth to be able to remove pt dressing post angio on 01/17/2025.  Gave authorization per LEVI Roberto direction.  Crystal voiced understanding.

## 2025-01-21 ENCOUNTER — APPOINTMENT (OUTPATIENT)
Facility: HOSPITAL | Age: 65
End: 2025-01-21
Payer: COMMERCIAL

## 2025-01-21 RX ORDER — POTASSIUM CHLORIDE 750 MG/1
10 TABLET, EXTENDED RELEASE ORAL DAILY
Qty: 30 TABLET | Refills: 1 | Status: SHIPPED | OUTPATIENT
Start: 2025-01-21

## 2025-01-21 NOTE — TELEPHONE ENCOUNTER
Caller: Lauren Marie    Relationship: Self    Best call back number: [phone]    Requested Prescriptions:   Requested Prescriptions     Pending Prescriptions Disp Refills    potassium chloride 10 MEQ CR tablet 30 tablet 1     Sig: Take 1 tablet by mouth Daily.        Pharmacy where request should be sent: [id number] [address] AT [address] [facility] [address] - [phone]  - [phone]      Last office visit with prescribing clinician: 11/27/2024   Last telemedicine visit with prescribing clinician: Visit date not found   Next office visit with prescribing clinician: Visit date not found     Additional details provided by patient:     Does the patient have less than a 3 day supply:  [x] Yes  [] No    Would you like a call back once the refill request has been completed: [] Yes [] No    If the office needs to give you a call back, can they leave a voicemail: [] Yes [] No    Hardik López Rep   01/21/25 08:38 EST

## 2025-01-27 ENCOUNTER — HOSPITAL ENCOUNTER (OUTPATIENT)
Facility: HOSPITAL | Age: 65
Setting detail: THERAPIES SERIES
Discharge: HOME | End: 2025-01-27
Payer: COMMERCIAL

## 2025-01-27 ENCOUNTER — TELEPHONE (OUTPATIENT)
Dept: NEUROSURGERY | Facility: CLINIC | Age: 65
End: 2025-01-27

## 2025-01-27 DIAGNOSIS — R53.1 LEFT-SIDED WEAKNESS: ICD-10-CM

## 2025-01-27 DIAGNOSIS — R41.841 COGNITIVE COMMUNICATION DEFICIT: Primary | ICD-10-CM

## 2025-01-27 DIAGNOSIS — I63.411 CEREBROVASCULAR ACCIDENT (CVA) DUE TO EMBOLISM OF RIGHT MIDDLE CEREBRAL ARTERY: Primary | ICD-10-CM

## 2025-01-27 PROCEDURE — 97129 THER IVNTJ 1ST 15 MIN: CPT | Performed by: SPEECH-LANGUAGE PATHOLOGIST

## 2025-01-27 PROCEDURE — 97130 THER IVNTJ EA ADDL 15 MIN: CPT | Performed by: SPEECH-LANGUAGE PATHOLOGIST

## 2025-01-27 PROCEDURE — 97110 THERAPEUTIC EXERCISES: CPT | Performed by: PHYSICAL THERAPIST

## 2025-01-27 PROCEDURE — 97530 THERAPEUTIC ACTIVITIES: CPT | Performed by: PHYSICAL THERAPIST

## 2025-01-27 NOTE — PROGRESS NOTES
Outpatient Adult Speech Language Therapy           Treatment Note/Progress Note    Patient: Lauren Marie                                                                                     Visit Date: 2025  :     1960    Referring practitioner:    Andrea Vega MD  Date of Initial Visit:          Type: THERAPY  Episode: Right MCA CVA; Facial droop    Patient seen for 7 sessions    Visit Diagnoses:    ICD-10-CM ICD-9-CM   1. Cognitive communication deficit  R41.841 799.52     SUBJECTIVE   Patient requires the skills of a therapist to increase executive functioning through cognitive therapy for safe and accurate completion of IADLS.       Education:  basic math skills needed for ADLs, discussed problems with IADLs at home with caregiver.  Patient is having difficulty attending and completing one task at a time.   OBJECTIVE   GOALS     GOALS ACTIVITY PERFORMED TRIALS COMPLETED LEVEL OF CUEING REQUIRED   LTG 1: 12 weeks: Patient will increase Functional Communication Measure level to 6/7 for thought organization and problem solving skills for safe completion of IADL tasks.                            STATUS:  New                           STG 1a: 12 weeks:  Patient will complete mod complex thought organization tasks (calendar, scheduling, medicine managment, checkbook registrar,  etc)  with min to mod assist to increase organization to solve moderately complex ADL problems.  organizing her thought   Patient completed Season Ticket activity.    Identifying what the question is asking      mod to max assist to read the question and identify the problem   STG 1b:12 weeks: Patient will complete mod complex  reasoning tasks (money management, counting change, time managment, deductive reasoning tasks, etc.) with min to mod assist to use appropriate strategies for ADLs.  Basic Math  Comparing ticket prices of Men and women's  athletics    -calculations and understanding proper arithmetic operations to problem solve Max assist   STG 2a: 12 weeks:  Patient will sustain attention to basic tasks for 45 minutes with min assist through redirection of sustained attention.  sustained attention  40 minutes   mod assist    STG 2b: 12 weeks.  Patient will complete divided attention tasks with min assist to attend to multiple information required to make appropriate and timely decisions for ADLS.  writing down costs of each ticket from the chart above  patient required cueing to understand the ticket pricing from the chart and write the correct cost down   mod to max assist secondary to decreased attention.     STG 2c:  12 weeks. Patient will complete alternating attention tasks with min assist to encode information required to complete multiple tasks.  alternate between tasks  Not addressed until 2a is acheived     STG 3a: 12 weeks: Patient will increase labial strength to a 4+/5      Labial strengthening   Min assist     Goal met   STG 3b: 12 weeks.  Patient will increase lingual strength to a 4+/5 Lingual strengthening   Min assist     Goal met         Total Time of Visit:            11:00- 11:55 = 55 minutes      ASSESSMENT/PLAN      ASSESSMENT: Patient requires the skills of a therapist to provide mod to max  cueing to increase problem solving skills to promote   executive functioning needed to complete IADLs.   Progress:    PLAN: Continue plan of care     Progress Note Due Date:2/16/25  Recertification Due Date:3/14/25     SIGNATURE: Marisela Nuñez, SLP, [place] License #: [id number]  Electronically Signed on 1/27/2025            Adult Outpatient Rehabiliation

## 2025-01-27 NOTE — TELEPHONE ENCOUNTER
Called pt and left vm in regards of appointment missed today at 9;30am.      Attempted pt cell first and it says its not a working number.        Hub okay to read, please verify pt numbers and reschedule.  Thank you.

## 2025-01-27 NOTE — THERAPY PROGRESS REPORT/RE-CERT
Physical Therapy Progress Note  [address] [place] [address]    Patient: Lauren Marie   : 1960  Referring practitioner: Andrea Vega MD  Date of Initial Visit: Type: THERAPY  Episode: Right MCA CVA  Today's Date: 2025  Patient seen for 6 sessions           Subjective   Lauren Marie reports: Patient reports had a test 25 in which went through right groin for access and sore today (pain scale 4/10)  Subjective Questionnaire: LEFS: 65/80=19% limitation improved from prior score 61/80=24% limitation         Objective          Active Range of Motion   Left Shoulder   Normal active range of motion    Right Shoulder   Normal active range of motion    Left Wrist   Normal active range of motion    Right Wrist   Normal active range of motion  Left Hip   Normal active range of motion    Right Hip   Normal active range of motion  Left Knee   Normal active range of motion    Right Knee   Normal active range of motion    Strength/Myotome Testing     Left Shoulder     Planes of Motion   Flexion: 4   Extension: 4   Abduction: 4     Right Shoulder   Normal muscle strength    Left Wrist/Hand   Wrist extension: 3+  Wrist flexion: 3+    Left Hip   Planes of Motion   Flexion: 4+  Extension: 4+  Abduction: 4+    Right Hip   Planes of Motion   Flexion: 4+  Extension: 4+  Abduction: 4+    Left Knee   Flexion: 4+  Extension: 4+    Right Knee   Flexion: 4+  Extension: 4+     General Comments     Wrist/Hand Comments  Patient demonstrates difficulty coordinating left hand/wrist movements     See Exercise, Manual, and Modality Logs for complete treatment.       Assessment & Plan       Assessment  Impairments: abnormal coordination, abnormal gait, abnormal muscle firing, activity intolerance, impaired balance, impaired physical strength and lacks appropriate home exercise program   Functional limitations: walking, standing, stooping and unable to perform repetitive tasks   Assessment details: Patient  demonstrates improvement with therapy, but still having deficits and would benefit from continued skilled physical therapy. Patient's balance and ambulation better, no longer using straight cane for short distances. Patient still has left arm/hand deficits, but progressing.   Prognosis: good    Goals  Plan Goals:  1. Mobility: Walking/Moving Around Functional Limitation     LTG 1:12 weeks:  The patient will demonstrate 6% limitation by achieving a score of 75/80 on the Lower Extremity Functional Scale.   STATUS:  progressing  STG 1a: 6 weeks:  The patient will demonstrate 12% limitation by achieving a score of 70/80 on the Lower Extremity Functional Scale.     STATUS:  progressing     2. The patient demonstrates weakness of the left hip/knee/wrist/shoulder.   LTG 2: 12weeks:  The patient will demonstrate 5/5 strength for left hip flexion, abduction, and extension in order to improve hip stability.   STATUS:  progressing  STG 2a: 6weeks:  The patient will demonstrate 4+/5 strength for left wrist flexion, and extension and left shoulder  STATUS: Met     3. The patient has gait dysfunction.   LTG 3: 12 weeks:  The patient will ambulate without assistive device, independently, for community distances with minimal limp to the left lower extremity in order to improve mobility and allow patient to perform activities such as grocery shopping with greater ease.   STATUS:  progressing  STG 3a: The patient will be independent in [facility].   STATUS:  Ongoing    Plan  Therapy options: will be seen for skilled therapy services  Planned therapy interventions: manual therapy, strengthening, stretching, therapeutic activities, balance/weight-bearing training, flexibility, functional ROM exercises, home exercise program and gait training  Frequency: 2x week  Duration in weeks: 8  Treatment plan discussed with: patient        Visit Diagnoses:    ICD-10-CM ICD-9-CM   1. Cerebrovascular accident (CVA) due to embolism of right middle cerebral  artery  I63.411 434.11   2. Left-sided weakness  R53.1 728.87       Progress per Plan of Care and Progress strengthening /stabilization /functional activity           Timed:  Manual Therapy:         mins  92127;  Therapeutic Exercise:    30     mins  55769;     Neuromuscular Yuri:        mins  12033;    Therapeutic Activity:     23     mins  07099;     Gait Training:           mins  48360;     Ultrasound:          mins  04872;    Electrical Stimulation:         mins  63285 ( );    Untimed:  Electrical Stimulation:         mins  78438 ( );  Mechanical Traction:         mins  04952;     Timed Treatment:   53   mins   Total Treatment:     53   mins  Radha Chaudhari PT    Electronically singed 1/27/2025      [place] PT license: [id number]  Physical Therapist

## 2025-01-28 ENCOUNTER — HOSPITAL ENCOUNTER (OUTPATIENT)
Facility: HOSPITAL | Age: 65
Setting detail: THERAPIES SERIES
Discharge: HOME | End: 2025-01-28
Payer: COMMERCIAL

## 2025-01-28 ENCOUNTER — TELEPHONE (OUTPATIENT)
Dept: FAMILY MEDICINE CLINIC | Facility: CLINIC | Age: 65
End: 2025-01-28
Payer: COMMERCIAL

## 2025-01-28 DIAGNOSIS — R53.1 LEFT-SIDED WEAKNESS: ICD-10-CM

## 2025-01-28 DIAGNOSIS — R41.841 COGNITIVE COMMUNICATION DEFICIT: Primary | ICD-10-CM

## 2025-01-28 DIAGNOSIS — I63.411 CEREBROVASCULAR ACCIDENT (CVA) DUE TO EMBOLISM OF RIGHT MIDDLE CEREBRAL ARTERY: Primary | ICD-10-CM

## 2025-01-28 PROCEDURE — 97129 THER IVNTJ 1ST 15 MIN: CPT | Performed by: SPEECH-LANGUAGE PATHOLOGIST

## 2025-01-28 PROCEDURE — 97530 THERAPEUTIC ACTIVITIES: CPT | Performed by: PHYSICAL THERAPIST

## 2025-01-28 PROCEDURE — 97110 THERAPEUTIC EXERCISES: CPT | Performed by: PHYSICAL THERAPIST

## 2025-01-28 PROCEDURE — 97130 THER IVNTJ EA ADDL 15 MIN: CPT | Performed by: SPEECH-LANGUAGE PATHOLOGIST

## 2025-01-28 RX ORDER — CLOPIDOGREL BISULFATE 75 MG/1
75 TABLET ORAL DAILY
Qty: 30 TABLET | Refills: 3 | Status: SHIPPED | OUTPATIENT
Start: 2025-01-28

## 2025-01-28 NOTE — THERAPY TREATMENT NOTE
Outpatient Physical Therapy Neuro Treatment Note   [facility]     Patient Name: Lauren Marie  : 1960  MRN: [id number]  Today's Date: 2025      Visit Date: 2025    Visit Dx:    ICD-10-CM ICD-9-CM   1. Cerebrovascular accident (CVA) due to embolism of right middle cerebral artery  I63.411 434.11   2. Left-sided weakness  R53.1 728.87       Patient Active Problem List   Diagnosis    Cigarette nicotine dependence without complication    Transient ischemic attack    Chronic back pain    Vertigo    Primary hypertension    CVA (cerebral vascular accident)    Cerebral aneurysm, nonruptured    Carotid artery stenosis with cerebral infarction    Stenosis of cerebral artery                        PT Assessment/Plan       Row Name 25 1000          PT Assessment    Functional Limitations Decreased safety during functional activities;Impaired gait;Limitations in functional capacity and performance  -RO     Impairments Balance;Cognition;Coordination;Endurance;Gait;Muscle strength  -RO     Assessment Comments Patient still needing mod-max cues for cross body UE/LE activities/coordination  -RO     Rehab Potential Good  -RO     Patient/caregiver participated in establishment of treatment plan and goals Yes  -RO     Patient would benefit from skilled therapy intervention Yes  -RO        PT Plan    PT Plan Comments continue per POC  -RO               User Key  (r) = Recorded By, (t) = Taken By, (c) = Cosigned By      Initials Name Provider Type    RO Radha Chaudhari, PT Physical Therapist                        OP Exercises       Row Name 25 1000             Subjective    Subjective Comments Patient reports feeling better today  -RO         Subjective Pain    Able to rate subjective pain? yes  -RO      Pre-Treatment Pain Level 0  -RO      Post-Treatment Pain Level 0  -RO         Total Minutes    76901 - PT Therapeutic Exercise Minutes 30  -RO      66802 - PT Therapeutic Activity Minutes 25  -RO          Exercise 1    Exercise Name 1 wrist curls 3 ways  -RO      Cueing 1 Verbal;Tactile;Demo  -RO      Sets 1 3lbs  -RO      Reps 1 x15 each direction  -RO      Time 1 Left  -RO         Exercise 2    Exercise Name 2 recumbent bike  -RO      Cueing 2 Verbal  -RO      Time 2 x4 min  -RO         Exercise 3    Exercise Name 3 UBE  -RO      Cueing 3 Tactile  -RO      Sets 3 x3 min forward  -RO      Reps 3 x3 min reverse  -RO         Exercise 4    Exercise Name 4 cross over stepping  -RO      Cueing 4 Verbal;Tactile;Demo  -RO      Sets 4 x20 ft each direction  -RO      Reps 4 SBA  -RO         Exercise 5    Exercise Name 5 tandem stepping  -RO      Cueing 5 Verbal;Tactile;Demo  -RO      Sets 5 30ft x1  -RO      Reps 5 SBA  -RO         Exercise 6    Exercise Name 6 bridges with hip add/ball squeeze  -RO      Cueing 6 Verbal;Tactile  -RO      Reps 6 x10 with 3 sec hold  -RO         Exercise 7    Exercise Name 7 pulleys into flexion  -RO      Cueing 7 Verbal;Tactile;Demo  -RO      Time 7 x2 min  -RO         Exercise 8    Exercise Name 8 hooklying alt UE/LE flexion  -RO      Cueing 8 Verbal;Tactile;Demo  -RO      Reps 8 x10 each side  -RO         Exercise 9    Exercise Name 9 standing hip abduction  -RO      Cueing 9 Verbal;Demo  -RO      Sets 9 at rail  -RO      Reps 9 x15 each LE  -RO      Time 9 2lb ankle weight  -RO         Exercise 10    Exercise Name 10 standing marching  -RO      Cueing 10 Verbal;Demo  -RO      Sets 10 at rail  -RO      Reps 10 x15 each LE  -RO      Time 10 2lb ankle weight  -RO         Exercise 11    Exercise Name 11 rows  -RO      Cueing 11 Verbal;Tactile;Demo  -RO      Sets 11 level 1  -RO      Reps 11 x20  -RO      Time 11 CGA of therapist  -RO         Exercise 12    Exercise Name 12 lat pull downs  -RO      Cueing 12 Verbal;Tactile;Demo  -RO      Reps 12 x20  -RO      Time 12 level1  -RO         Exercise 13    Exercise Name 13 standing heel/toe raises  -RO      Cueing 13 Verbal;Demo  -RO       Sets 13 at rail  -RO      Reps 13 x15 B LE  -RO      Time 13 2lb ankle weight  -RO         Exercise 14    Exercise Name 14 slant board calf stretch  -RO      Cueing 14 Verbal;Tactile;Demo  -RO      Sets 14 3x20 sec hold  -RO         Exercise 15    Exercise Name 15 standing shoulder abduction  -RO      Cueing 15 Verbal;Demo;Tactile  -RO      Sets 15 x10  -RO         Exercise 16    Exercise Name 16 standing alt UE/LE flexion  -RO      Cueing 16 Verbal;Tactile;Demo  -RO      Sets 16 2x10 reciprocal  -RO         Exercise 17    Exercise Name 17 hooklying hip abduction  -RO      Cueing 17 Verbal;Tactile  -RO      Sets 17 blue band  -RO      Reps 17 x20  -RO         Exercise 18    Exercise Name 18 step tapping  -RO      Cueing 18 Verbal;Demo  -RO      Sets 18 x10 each LE  -RO      Reps 18 aerobic step level 2  -RO         Exercise 19    Exercise Name 19 weaving through cones  -RO      Cueing 19 Verbal;Demo  -RO      Sets 19 20ft with SBA  -RO      Reps 19  cones x6 with L hand  -RO      Time 19 SBA  -RO         Exercise 20    Exercise Name 20 sit to stands from raised mat table  -RO      Cueing 20 Verbal;Tactile;Demo  -RO      Sets 20 cues to not use UEs  -RO      Reps 20 x10  -RO                User Key  (r) = Recorded By, (t) = Taken By, (c) = Cosigned By      Initials Name Provider Type    Radha Norton, PT Physical Therapist                                                   Time Calculation:   Timed Charges  56791 - PT Therapeutic Exercise Minutes: 30  99543 - PT Therapeutic Activity Minutes: 25  Total Minutes  Timed Charges Total Minutes: 55   Total Minutes: 55                 Radha Chaudhari PT  1/28/2025

## 2025-01-28 NOTE — THERAPY TREATMENT NOTE
Outpatient Adult Speech Language Therapy           Treatment Note    Patient: Lauren Marie                                                                                     Visit Date: 2025  :     1960    Referring practitioner:    Andrea Vega MD  Date of Initial Visit:          Type: THERAPY  Episode: Right MCA CVA; Facial droop    Patient seen for 8 sessions    Visit Diagnoses:    ICD-10-CM ICD-9-CM   1. Cognitive communication deficit  R41.841 799.52     SUBJECTIVE   Patient requires the skills of a therapist to increase executive functioning through cognitive therapy for safe and accurate completion of IADLS.       Education:  basic math skills needed for ADLs, discussed problems with IADLs at home with caregiver.  Patient is having difficulty attending and completing one task at a time.   OBJECTIVE   GOALS     GOALS ACTIVITY PERFORMED TRIALS COMPLETED LEVEL OF CUEING REQUIRED   LTG 1: 12 weeks: Patient will increase Functional Communication Measure level to 6/7 for thought organization and problem solving skills for safe completion of IADL tasks.                            STATUS:  New                           STG 1a: 12 weeks:  Patient will complete mod complex thought organization tasks (calendar, scheduling, medicine managment, checkbook registrar,  etc)  with min to mod assist to increase organization to solve moderately complex ADL problems.      STG 1b:12 weeks: Patient will complete mod complex  reasoning tasks (money management, counting change, time managment, deductive reasoning tasks, etc.) with min to mod assist to use appropriate strategies for ADLs.  Basic Math Ordering and calculating the bill at a local [employer] store    Patient able to calculate total bill without tax with one cueing      Patient taught percentages rates based on one dollar.  Patient able to carry over concept that every dollar she  spends at dollar store she adds 6 more cents.     Min to mod assist   STG 2a: 12 weeks:  Patient will sustain attention to basic tasks for 45 minutes with min assist through redirection of sustained attention.  sustained attention  40 minutes   mod assist    STG 2b: 12 weeks.  Patient will complete divided attention tasks with min assist to attend to multiple information required to make appropriate and timely decisions for ADLS. Divided attention Connecting partial shapes with whole shape in the chart  11/13  15/18    Finding shapes in a design that is in a chart and matching it to a incomplete shape in a list. 14/14 Min to mod assist to attend to the shapes and partial shapes.     STG 2c:  12 weeks. Patient will complete alternating attention tasks with min assist to encode information required to complete multiple tasks.      STG 3a: 12 weeks: Patient will increase labial strength to a 4+/5      Labial strengthening   Min assist     Goal met   STG 3b: 12 weeks.  Patient will increase lingual strength to a 4+/5 Lingual strengthening   Min assist     Goal met         Total Time of Visit:            10:05-11:00= 55 minutes      ASSESSMENT/PLAN      ASSESSMENT: Patient requires the skills of a therapist to provide reduced cueing to min to mod assist to increase problem solving and attention skills to promote executive functioning needed to complete IADLs.   Progress:  Patient able to carry over understanding of taxes and total bill to her daily shopping.  She has an effective process of calculating approximate cost when buying goods a her local [employer].   PLAN: Continue plan of care     Progress Note Due Date:2/16/25  Recertification Due Date:3/14/25     SIGNATURE: MARC [place], [place] License #: [id number]  Electronically Signed on 1/28/2025            [facility]                                             [address] [place] [address]. [address]  [phone] Office  [phone] Fax

## 2025-01-28 NOTE — TELEPHONE ENCOUNTER
Patient called in to reschedule and the soonest they could do is Monday February 10 so she is scheduled for that day.

## 2025-01-29 NOTE — PROGRESS NOTES
"Subjective   Patient ID: Lauren Marie is a 64 y.o. female is here today as a post operative visit     IR holland arch and 3 vessel head on 01/17/2025    -Cerebral aneurysm, nonruptured   Follow up, last seen in office 12/19/2024    Patient reports feeling fine.  She is 3walking with a walker and reports having balance issues at times.  She does have a headache on the center of the forehead/eyes(blurry).  She has an eye doctor appointment on Friday.        HPI:  Patient presents today after her recent diagnostic cerebral angiogram.  She has no complaints.  She does have some blurry vision but she is seeing an ophthalmologist soon    Review of Systems    Objective     Vitals:    02/10/25 0813   BP: 128/76   BP Location: Right arm   Patient Position: Sitting   Cuff Size: Adult   Pulse: 80   Resp: 16   Temp: 97.8 °F (36.6 °C)   TempSrc: Temporal   SpO2: 98%   Weight: 73.5 kg (162 lb)   Height: 167.6 cm (65.98\")   PainSc: 5    PainLoc: Head     Body mass index is 26.16 kg/m².    Lab Results   Component Value Date    WBC 6.61 12/23/2024    HGB 14.5 12/23/2024    HCT 41.5 12/23/2024    MCV 93.9 12/23/2024     12/23/2024     Lab Results   Component Value Date    GLUCOSE 95 12/23/2024    CALCIUM 9.9 12/23/2024     12/23/2024    K 4.0 12/23/2024    CO2 24.7 12/23/2024     (H) 12/23/2024    BUN 13 12/23/2024    CREATININE 0.70 01/17/2025    EGFR 67.0 12/23/2024    BCR 13.7 12/23/2024    ANIONGAP 7.3 12/23/2024       Physical Exam:    NAD  A&O3  Face very very mild left facial droop, pupils equal round and reactive to light, EOMI  Motor:   5/5 right upper extremity, 4 out of 5 in right upper extremity  5/5 BLE  Gait normal  Left pronator drift    Assessment & Plan     Lauren Marie  reports that she quit smoking about 3 months ago. Her smoking use included cigarettes. She started smoking about 45 years ago. She has a 22.4 pack-year smoking history. She has been exposed to tobacco smoke. She has never " used smokeless tobacco.     Assessment & Plan  Carotid artery stenosis with cerebral infarction  Patient has chronic right cervical carotid occlusion and moderate right supraclinoid ICA stenosis.  She is on aspirin and Plavix currently and I recommend that she consider starting a statin and I will defer to her PCP regarding management of that.    We will follow-up with a 1 year CTA  Orders:    CT Angiogram Head With Contrast; Future    CT Angiogram Neck; Future    Cerebral aneurysm, nonruptured  Notably, the patient has 2 aneurysms, right by MCA bifurcation aneurysm measuring 3.1 x 3.34.  This would be not amenable to endovascular treatment and is too small for treatment regardless.  There is a small wide necked 1-1/2 x 1 mm aneurysm at the left MCA bifurcation.  As we will be following her iCAD with CTAs, we will repeat the CTA at the 1 year roland and also check for change in these aneurysms.  Orders:    CT Angiogram Head With Contrast; Future    CT Angiogram Neck; Future      Return in about 9 months (around 11/10/2025) for Follow-up after imaging.     This note was completed using Dragon Dictation software.

## 2025-01-30 RX ORDER — ATORVASTATIN CALCIUM 80 MG/1
80 TABLET, FILM COATED ORAL NIGHTLY
Qty: 30 TABLET | Refills: 0 | Status: SHIPPED | OUTPATIENT
Start: 2025-01-30

## 2025-01-30 NOTE — TELEPHONE ENCOUNTER
Caller: Lauren Marie    Relationship: Self    Best call back number: [phone]     Requested Prescriptions:   Requested Prescriptions     Pending Prescriptions Disp Refills    atorvastatin (LIPITOR) 80 MG tablet 30 tablet 0     Sig: Take 1 tablet by mouth Every Night.        Pharmacy where request should be sent: [id number] [address] AT [facility] & [address]  [phone] [facility] [phone]      Last office visit with prescribing clinician: 11/27/2024   Last telemedicine visit with prescribing clinician: Visit date not found   Next office visit with prescribing clinician: Visit date not found       Does the patient have less than a 3 day supply:  [x] Yes  [] No    Would you like a call back once the refill request has been completed: [] Yes [x] No    If the office needs to give you a call back, can they leave a voicemail: [] Yes [x] No    Hardik Raza Rep   01/30/25 11:42 EST

## 2025-02-03 ENCOUNTER — APPOINTMENT (OUTPATIENT)
Facility: HOSPITAL | Age: 65
End: 2025-02-03
Payer: COMMERCIAL

## 2025-02-03 ENCOUNTER — OFFICE VISIT (OUTPATIENT)
Age: 65
End: 2025-02-03
Payer: COMMERCIAL

## 2025-02-03 ENCOUNTER — PATIENT ROUNDING (BHMG ONLY) (OUTPATIENT)
Age: 65
End: 2025-02-03

## 2025-02-03 VITALS
BODY MASS INDEX: 25.39 KG/M2 | OXYGEN SATURATION: 95 % | TEMPERATURE: 98.1 F | SYSTOLIC BLOOD PRESSURE: 132 MMHG | HEIGHT: 66 IN | WEIGHT: 158 LBS | DIASTOLIC BLOOD PRESSURE: 104 MMHG | RESPIRATION RATE: 18 BRPM | HEART RATE: 94 BPM

## 2025-02-03 DIAGNOSIS — I65.23 OCCLUSION AND STENOSIS OF BILATERAL CAROTID ARTERIES: Primary | ICD-10-CM

## 2025-02-03 PROCEDURE — 1159F MED LIST DOCD IN RCRD: CPT | Performed by: SURGERY

## 2025-02-03 PROCEDURE — 1160F RVW MEDS BY RX/DR IN RCRD: CPT | Performed by: SURGERY

## 2025-02-03 PROCEDURE — 3075F SYST BP GE 130 - 139MM HG: CPT | Performed by: SURGERY

## 2025-02-03 PROCEDURE — 3080F DIAST BP >= 90 MM HG: CPT | Performed by: SURGERY

## 2025-02-03 PROCEDURE — 99203 OFFICE O/P NEW LOW 30 MIN: CPT | Performed by: SURGERY

## 2025-02-03 NOTE — PROGRESS NOTES
[facility]   HISTORY AND PHYSICAL    Patient Name: Lauren Marie  : 1960  MRN: [id number]  Primary Care Physician:  Adali Kim APRN  Date of admission: (Not on file)    Subjective   Subjective     Chief Complaint: Right carotid occlusion    HPI:    Lauren Marie is a 64 y.o. female who in 2024 presented with right brain symptoms and was noted to have suffered a right brain CVA with right carotid occlusion.  She comes to see me for further evaluation from the vascular standpoint.  She indicates that she will quit smoking.    Review of Systems    Non contributory except for the History of Present Illness    Personal History     Past Medical History:   Diagnosis Date    Aneurysm     Hypertension     Injury of back     Stroke     TIA (transient ischemic attack)     Vertigo        Past Surgical History:   Procedure Laterality Date     SECTION      THYROID SURGERY      US GUIDED FINE NEEDLE ASPIRATION  2020       Family History: family history is not on file. Otherwise pertinent FHx was reviewed and not pertinent to current issue.    Social History:  reports that she quit smoking about 2 months ago. Her smoking use included cigarettes. She started smoking about 45 years ago. She has a 22.4 pack-year smoking history. She has been exposed to tobacco smoke. She has never used smokeless tobacco. She reports that she does not drink alcohol and does not use drugs.    Home Medications:  Current Outpatient Medications on File Prior to Visit   Medication Sig    albuterol sulfate  (90 Base) MCG/ACT inhaler Inhale 2 puffs Every 4 (Four) Hours As Needed for Wheezing.    amLODIPine (NORVASC) 5 MG tablet Take 1 tablet by mouth Daily.    aspirin 81 MG EC tablet Take 1 tablet by mouth Daily.    atorvastatin (LIPITOR) 80 MG tablet Take 1 tablet by mouth Every Night.    clopidogrel (PLAVIX) 75 MG tablet Take 1 tablet by mouth Daily.    Diclofenac Sodium (Voltaren Arthritis Pain) 1 %  gel gel Apply 4 g topically to the appropriate area as directed 3 (Three) Times a Day.    hydroCHLOROthiazide 12.5 MG tablet Take 1 tablet by mouth Daily.    ibuprofen (ADVIL,MOTRIN) 800 MG tablet Take 1 tablet by mouth Every 6 (Six) Hours As Needed for Mild Pain.    lidocaine (LIDODERM) 5 % Place 1 patch on the skin as directed by provider Daily. Remove & Discard patch within 12 hours or as directed by MD    lisinopril (PRINIVIL,ZESTRIL) 40 MG tablet Take 1 tablet by mouth Daily.    meclizine (ANTIVERT) 25 MG tablet Take 1 tablet by mouth 3 (Three) Times a Day As Needed for Dizziness.    methocarbamol (ROBAXIN) 750 MG tablet Take 1 tablet by mouth 4 (Four) Times a Day As Needed for Muscle Spasms.    potassium chloride 10 MEQ CR tablet Take 1 tablet by mouth Daily.     No current facility-administered medications on file prior to visit.          Allergies:  No Known Allergies    Objective   Objective     Vitals:   Temp:  [98.1 °F (36.7 °C)] 98.1 °F (36.7 °C)  Heart Rate:  [94] 94  Resp:  [18] 18  BP: (132)/(104) 132/104    Physical Exam    General: Awake, alert, NAD   Eyes:  MELCHOR   Neck: Supple   Lungs: Clear   Heart: RRR   Abdomen: benign   Musculoskeletal:  normal motor tone, symmetric   Skin: warm, normal turgor   Neuro: strength 5/5 all extremities   Pulses: +2 bilateral radials, +2 bilateral popliteal    Diagnostic studies:   A CTA of the neck dated 11/23/2024 and a carotid angiogram dated 1/17/2025 have been reviewed.  Complete right ICA occlusion.  No significant left carotid stenosis.    Assessment & Plan   Assessment / Plan     Active Hospital Problems:  There are no active hospital problems to display for this patient.      Diagnoses and all orders for this visit:    1. Occlusion and stenosis of bilateral carotid arteries (Primary)  -     Duplex Carotid Ultrasound CAR; Future        Assessment/plan:   Mrs. Marie has experienced right carotid occlusion.  She suffered a neurologic event at the time of which  she is almost completely recovered.  I discussed with her in detail today further management at this time most specifically the need for surveillance of the opposite side.  She takes aspirin daily.  The plan will be for a carotid duplex in 6 months.      Electronically signed by Jeramie East MD, 02/03/25, 9:12 AM EST.

## 2025-02-04 ENCOUNTER — HOSPITAL ENCOUNTER (OUTPATIENT)
Facility: HOSPITAL | Age: 65
Setting detail: THERAPIES SERIES
Discharge: HOME | End: 2025-02-04
Payer: COMMERCIAL

## 2025-02-04 DIAGNOSIS — R41.841 COGNITIVE COMMUNICATION DEFICIT: Primary | ICD-10-CM

## 2025-02-04 DIAGNOSIS — I63.411 CEREBROVASCULAR ACCIDENT (CVA) DUE TO EMBOLISM OF RIGHT MIDDLE CEREBRAL ARTERY: Primary | ICD-10-CM

## 2025-02-04 DIAGNOSIS — R53.1 LEFT-SIDED WEAKNESS: ICD-10-CM

## 2025-02-04 PROCEDURE — 97129 THER IVNTJ 1ST 15 MIN: CPT | Performed by: SPEECH-LANGUAGE PATHOLOGIST

## 2025-02-04 PROCEDURE — 97110 THERAPEUTIC EXERCISES: CPT | Performed by: PHYSICAL THERAPIST

## 2025-02-04 PROCEDURE — 97530 THERAPEUTIC ACTIVITIES: CPT | Performed by: PHYSICAL THERAPIST

## 2025-02-04 PROCEDURE — 97130 THER IVNTJ EA ADDL 15 MIN: CPT | Performed by: SPEECH-LANGUAGE PATHOLOGIST

## 2025-02-04 NOTE — THERAPY TREATMENT NOTE
"                                                                      Outpatient Adult Speech Language Therapy           Treatment Note    Patient: Lauren Marie                                                                                     Visit Date: 2025  :     1960    Referring practitioner:    Andrea Vega MD  Date of Initial Visit:          Type: THERAPY  Episode: Right MCA CVA; Facial droop    Patient seen for 9 sessions    Visit Diagnoses:    ICD-10-CM ICD-9-CM   1. Cognitive communication deficit  R41.841 799.52     SUBJECTIVE   Patient requires the skills of a therapist to increase executive functioning through cognitive therapy for safe and accurate completion of IADLS.       Education:  Patient indicates she is recognizing when she is not attending and will stop her children and say, \"slow down and say that again\", to accurately process information.   OBJECTIVE   GOALS     GOALS ACTIVITY PERFORMED TRIALS COMPLETED LEVEL OF CUEING REQUIRED   LTG 1: 12 weeks: Patient will increase Functional Communication Measure level to 6/7 for thought organization and problem solving skills for safe completion of IADL tasks.                            STATUS:  New                           STG 1a: 12 weeks:  Patient will complete mod complex thought organization tasks (calendar, scheduling, medicine managment, checkbook registrar,  etc)  with min to mod assist to increase organization to solve moderately complex ADL problems.  Calendar Activity  [employer] calendar activity 8/10 80%    Cassy's schedule  75% (organizing the information on the schedule)    Organizing a pantry according to directions     Closet organization task  min to mod assist   STG 1b:12 weeks: Patient will complete mod complex  reasoning tasks (money management, counting change, time managment, deductive reasoning tasks, etc.) with min to mod assist to use appropriate strategies for ADLs.  Deductive reasoning Cassy's schedule " 6/8  with cueing to deductive reason-75%       Moderate assist   STG 2a: 12 weeks:  Patient will sustain attention to basic tasks for 45 minutes with min assist through redirection of sustained attention.  sustained attention  increased to 50 minutes       Scanning for K and R s on 20 lines of letters  min assist      Goal met  2/4/25    STG 2b: 12 weeks.  Patient will complete divided attention tasks with min assist to attend to multiple information required to make appropriate and timely decisions for ADLS. Divided attention Attending to graphs and directions.  Placing pertinent information from the paragraphs into the graphs. Min to mod assist to cue for correct line.     STG 2c:  12 weeks. Patient will complete alternating attention tasks with min assist to encode information required to complete multiple tasks.  alternating between tasks    mod assist   STG 3a: 12 weeks: Patient will increase labial strength to a 4+/5      Labial strengthening   Min assist     Goal met   STG 3b: 12 weeks.  Patient will increase lingual strength to a 4+/5 Lingual strengthening   Min assist     Goal met         Total Time of Visit:           11:00-11:56=56 minutes      ASSESSMENT/PLAN      ASSESSMENT: Patient requires the skills of a therapist to provide  min to mod assist to increase thought organization, attention and  problem solving to promote executive functioning needed to complete IADLs.   Progress:  Patient met sustained attention goal.  She is now able to slow down and process information without getting frustrated increasing her accuracy.    PLAN: Continue plan of care     Progress Note Due Date:2/16/25  Recertification Due Date:3/14/25     SIGNATURE: Marisela Nuñez, SLP, [place] License #: [id number]  Electronically Signed on 2/4/2025            Adult Outpatient Rehabiliation

## 2025-02-04 NOTE — THERAPY TREATMENT NOTE
"  Outpatient Physical Therapy Neuro Treatment Note   [facility]     Patient Name: Lauren Marie  : 1960  MRN: [id number]  Today's Date: 2025      Visit Date: 2025    Visit Dx:    ICD-10-CM ICD-9-CM   1. Cerebrovascular accident (CVA) due to embolism of right middle cerebral artery  I63.411 434.11   2. Left-sided weakness  R53.1 728.87       Patient Active Problem List   Diagnosis    Cigarette nicotine dependence without complication    Transient ischemic attack    Chronic back pain    Vertigo    Primary hypertension    CVA (cerebral vascular accident)    Cerebral aneurysm, nonruptured    Carotid artery stenosis with cerebral infarction    Stenosis of cerebral artery                        PT Assessment/Plan       Row Name 25 1000          PT Assessment    Functional Limitations Decreased safety during functional activities;Impaired gait;Limitations in functional capacity and performance  -RO     Impairments Balance;Cognition;Coordination;Endurance;Gait;Muscle strength  -RO     Assessment Comments Patient tolerating increase in reps with exercises today  -RO     Rehab Potential Good  -RO     Patient/caregiver participated in establishment of treatment plan and goals Yes  -RO     Patient would benefit from skilled therapy intervention Yes  -RO        PT Plan    PT Plan Comments continue to progress strengthening as tolerated  -RO               User Key  (r) = Recorded By, (t) = Taken By, (c) = Cosigned By      Initials Name Provider Type    RO Radha Chaudhrai, PT Physical Therapist                        OP Exercises       Row Name 25 1000             Subjective    Subjective Comments Patient reports just feeling \"stiff\" all over.  -RO         Subjective Pain    Able to rate subjective pain? yes  -RO      Pre-Treatment Pain Level 0  -RO      Post-Treatment Pain Level 0  -RO         Total Minutes    96475 - PT Therapeutic Exercise Minutes 30  -RO      18070 - PT Therapeutic Activity " Minutes 15  -RO         Exercise 1    Exercise Name 1 wrist curls 3 ways  -RO      Cueing 1 Verbal;Tactile;Demo  -RO      Sets 1 3lbs  -RO      Reps 1 2x10 each direction  -RO      Time 1 Left  -RO         Exercise 2    Exercise Name 2 recumbent bike  -RO      Cueing 2 Verbal  -RO      Time 2 x4 min  -RO         Exercise 3    Exercise Name 3 UBE  -RO      Cueing 3 Tactile  -RO      Sets 3 x2 min forward  -RO      Reps 3 x2 min reverse  -RO         Exercise 4    Exercise Name 4 cross over stepping  -RO      Cueing 4 Verbal;Tactile;Demo  -RO      Sets 4 x20 ft each direction  -RO      Reps 4 SBA  -RO         Exercise 5    Exercise Name 5 tandem stepping  -RO      Cueing 5 Verbal;Tactile;Demo  -RO      Sets 5 30ft x1  -RO      Reps 5 SBA  -RO         Exercise 6    Exercise Name 6 bridges with hip add/ball squeeze  -RO      Cueing 6 Verbal;Tactile  -RO      Reps 6 x10 with 3 sec hold  -RO         Exercise 7    Exercise Name 7 pulleys into flexion  -RO      Cueing 7 Verbal;Tactile;Demo  -RO      Time 7 x2 min  -RO         Exercise 8    Exercise Name 8 hooklying alt UE/LE flexion  -RO      Cueing 8 Verbal;Tactile;Demo  -RO      Reps 8 x10 each side  -RO         Exercise 9    Exercise Name 9 standing hip abduction  -RO      Cueing 9 Verbal;Demo  -RO      Sets 9 at rail  -RO      Reps 9 x20 each LE  -RO      Time 9 2lb ankle weight  -RO         Exercise 10    Exercise Name 10 standing marching  -RO      Cueing 10 Verbal;Demo  -RO      Sets 10 at rail  -RO      Reps 10 x20 reciprocal LEs  -RO      Time 10 2lb ankle weight  -RO         Exercise 11    Exercise Name 11 rows  -RO      Cueing 11 Verbal;Tactile;Demo  -RO      Sets 11 level 1  -RO      Reps 11 x20  -RO      Time 11 CGA of therapist  -RO         Exercise 12    Exercise Name 12 lat pull downs  -RO      Cueing 12 Verbal;Tactile;Demo  -RO      Reps 12 x20  -RO      Time 12 level1  -RO         Exercise 13    Exercise Name 13 standing heel/toe raises  -RO      Cueing 13  Verbal;Demo  -RO      Sets 13 at rail  -RO      Reps 13 x20 B LE  -RO      Time 13 2lb ankle weight  -RO         Exercise 14    Exercise Name 14 slant board calf stretch  -RO      Cueing 14 Verbal;Tactile;Demo  -RO      Sets 14 3x20 sec hold  -RO      Time 14 not today  -RO         Exercise 15    Exercise Name 15 standing shoulder abduction  -RO      Cueing 15 Verbal;Demo;Tactile  -RO      Sets 15 x10  -RO         Exercise 16    Exercise Name 16 standing alt UE/LE flexion  -RO      Cueing 16 Verbal;Tactile;Demo  -RO      Sets 16 2x10 reciprocal  -RO         Exercise 17    Exercise Name 17 hooklying hip adduction  -RO      Cueing 17 Verbal;Tactile  -RO      Sets 17 --  -RO      Reps 17 x20  -RO         Exercise 18    Exercise Name 18 step tapping  -RO      Cueing 18 Verbal;Demo  -RO      Sets 18 x10 each LE  -RO      Reps 18 6 inch step  -RO         Exercise 19    Exercise Name 19 hooklying hip abduction  -RO      Cueing 19 Verbal;Tactile  -RO      Sets 19 black band  -RO      Reps 19 x20  -RO      Time 19 --  -RO         Exercise 20    Exercise Name 20 sit to stands from raised mat table  -RO      Cueing 20 Verbal;Tactile;Demo  -RO      Sets 20 cues to not use UEs  -RO      Reps 20 x10  -RO                User Key  (r) = Recorded By, (t) = Taken By, (c) = Cosigned By      Initials Name Provider Type    Radha Norton, PT Physical Therapist                                                   Time Calculation:   Timed Charges  63583 - PT Therapeutic Exercise Minutes: 30  50699 - PT Therapeutic Activity Minutes: 15  Total Minutes  Timed Charges Total Minutes: 45   Total Minutes: 45                 Radha Chaudhari PT  2/4/2025

## 2025-02-05 DIAGNOSIS — I10 PRIMARY HYPERTENSION: Chronic | ICD-10-CM

## 2025-02-05 RX ORDER — LISINOPRIL 40 MG/1
40 TABLET ORAL DAILY
Qty: 90 TABLET | Refills: 1 | Status: SHIPPED | OUTPATIENT
Start: 2025-02-05

## 2025-02-05 NOTE — TELEPHONE ENCOUNTER
Caller: Lauren Marie    Relationship: Self    Best call back number: [phone]     Requested Prescriptions:   Requested Prescriptions     Pending Prescriptions Disp Refills    lisinopril (PRINIVIL,ZESTRIL) 40 MG tablet 90 tablet 1     Sig: Take 1 tablet by mouth Daily.        Pharmacy where request should be sent: [facility] #[id number] [address] & [place] - [phone] [facility] [phone]      Last office visit with prescribing clinician: 11/27/2024   Last telemedicine visit with prescribing clinician: Visit date not found   Next office visit with prescribing clinician: 2/7/2025     Additional details provided by patient: OUT OF MEDICATION    Does the patient have less than a 3 day supply:  [x] Yes  [] No    Would you like a call back once the refill request has been completed: [] Yes [] No    If the office needs to give you a call back, can they leave a voicemail: [] Yes [] No    Hradik Kevin Rep   02/05/25 08:57 EST

## 2025-02-10 ENCOUNTER — HOSPITAL ENCOUNTER (OUTPATIENT)
Facility: HOSPITAL | Age: 65
Setting detail: THERAPIES SERIES
Discharge: HOME | End: 2025-02-10
Payer: COMMERCIAL

## 2025-02-10 ENCOUNTER — OFFICE VISIT (OUTPATIENT)
Dept: NEUROSURGERY | Facility: CLINIC | Age: 65
End: 2025-02-10
Payer: COMMERCIAL

## 2025-02-10 VITALS
HEIGHT: 66 IN | WEIGHT: 162 LBS | DIASTOLIC BLOOD PRESSURE: 76 MMHG | OXYGEN SATURATION: 98 % | SYSTOLIC BLOOD PRESSURE: 128 MMHG | HEART RATE: 80 BPM | RESPIRATION RATE: 16 BRPM | TEMPERATURE: 97.8 F | BODY MASS INDEX: 26.03 KG/M2

## 2025-02-10 DIAGNOSIS — I67.1 CEREBRAL ANEURYSM, NONRUPTURED: ICD-10-CM

## 2025-02-10 DIAGNOSIS — I63.239 CAROTID ARTERY STENOSIS WITH CEREBRAL INFARCTION: Primary | ICD-10-CM

## 2025-02-10 DIAGNOSIS — R53.1 LEFT-SIDED WEAKNESS: ICD-10-CM

## 2025-02-10 DIAGNOSIS — R41.841 COGNITIVE COMMUNICATION DEFICIT: Primary | ICD-10-CM

## 2025-02-10 DIAGNOSIS — I63.411 CEREBROVASCULAR ACCIDENT (CVA) DUE TO EMBOLISM OF RIGHT MIDDLE CEREBRAL ARTERY: Primary | ICD-10-CM

## 2025-02-10 PROCEDURE — 97129 THER IVNTJ 1ST 15 MIN: CPT | Performed by: SPEECH-LANGUAGE PATHOLOGIST

## 2025-02-10 PROCEDURE — 3074F SYST BP LT 130 MM HG: CPT | Performed by: STUDENT IN AN ORGANIZED HEALTH CARE EDUCATION/TRAINING PROGRAM

## 2025-02-10 PROCEDURE — 3078F DIAST BP <80 MM HG: CPT | Performed by: STUDENT IN AN ORGANIZED HEALTH CARE EDUCATION/TRAINING PROGRAM

## 2025-02-10 PROCEDURE — 97110 THERAPEUTIC EXERCISES: CPT | Performed by: PHYSICAL THERAPIST

## 2025-02-10 PROCEDURE — 97530 THERAPEUTIC ACTIVITIES: CPT | Performed by: PHYSICAL THERAPIST

## 2025-02-10 PROCEDURE — 97130 THER IVNTJ EA ADDL 15 MIN: CPT | Performed by: SPEECH-LANGUAGE PATHOLOGIST

## 2025-02-10 NOTE — THERAPY TREATMENT NOTE
Outpatient Adult Speech Language Therapy           Treatment Note    Patient: Lauren Marie                                                                                     Visit Date: 2/10/2025  :     1960    Referring practitioner:    Andrea Vega MD  Date of Initial Visit:          Type: THERAPY  Episode: Right MCA CVA; Facial droop    Patient seen for 10 sessions    Visit Diagnoses:    ICD-10-CM ICD-9-CM   1. Cognitive communication deficit  R41.841 799.52     SUBJECTIVE   Patient requires the skills of a therapist to increase executive functioning through cognitive therapy for safe and accurate completion of IADLS.       Education: Patient indicates she is back to completing tasks at home.  She indicates she is carrying over the strategy of doing one activity at a time. She indicated she went to get the phone the other day and realized she better shut the water off in the sink before answering the phone.  She felt better that she is remembering to only attend to one task at a time.  It is preventing accidents from happening in the home.       OBJECTIVE  GOALS     GOALS ACTIVITY PERFORMED TRIALS COMPLETED LEVEL OF CUEING REQUIRED   LTG 1: 12 weeks: Patient will increase Functional Communication Measure level to 6/7 for thought organization and problem solving skills for safe completion of IADL tasks.                            STATUS:  New                           STG 1a: 12 weeks:  Patient will complete mod complex thought organization tasks (calendar, scheduling, medicine managment, checkbook registrar,  etc)  with min to mod assist to increase organization to solve moderately complex ADL problems.  Calendar Activity  scheduling events on the calendar with correct information    Trial 1:  85%   Trial 2:  100%  Trial 3:  83 %      min assist   STG 1b:12 weeks: Patient will complete mod complex  reasoning tasks  (money management, counting change, time managment, deductive reasoning tasks, etc.) with min to mod assist to use appropriate strategies for ADLs.  Deductive reasoning    Problem solving- answering questions regarding scheduling additional events on calendar    2/3 66%      When to take medication Min to mod assist                        Mod assist   STG 2a: 12 weeks:  Patient will sustain attention to basic tasks for 45 minutes with min assist through redirection of sustained attention.  sustained attention    min assist        Goal met  2/4/25    STG 2b: 12 weeks.  Patient will complete divided attention tasks with min assist to attend to multiple information required to make appropriate and timely decisions for ADLS. Divided attention Attending to appointment and writing correct date, time, and event on the calendar    Trial 1: 6/7 85%  Trial 2: 6/6 100%  Trial 3: 5/6 83%   Min  assist    STG 2c:  12 weeks. Patient will complete alternating attention tasks with min assist to encode information required to complete multiple tasks.  alternating between tasks    min to mod assist   STG 3a: 12 weeks: Patient will increase labial strength to a 4+/5      Labial strengthening   Min assist     Goal met   STG 3b: 12 weeks.  Patient will increase lingual strength to a 4+/5 Lingual strengthening   Min assist     Goal met         Total Time of Visit:           11:= 57 minutes      ASSESSMENT/PLAN      ASSESSMENT: Patient requires the skills of a therapist to provide  min to mod assist to increase thought organization, attention and  problem solving to promote executive functioning needed to complete IADLs.   Progress: Reduced cueing today  PLAN: Continue plan of care     Progress Note Due Date:2/16/25  Recertification Due Date:3/14/25     SIGNATURE: Marisela Nuñez, SLP, [place] License #: [id number]  Electronically Signed on 2/10/2025            Adult Outpatient Rehabiliation                                              [address] [place] [address]  [phone] Office  [phone] Fax

## 2025-02-10 NOTE — THERAPY TREATMENT NOTE
Outpatient Physical Therapy Neuro Treatment Note   [facility]     Patient Name: Lauren Marie  : 1960  MRN: [id number]  Today's Date: 2/10/2025      Visit Date: 02/10/2025    Visit Dx:    ICD-10-CM ICD-9-CM   1. Cerebrovascular accident (CVA) due to embolism of right middle cerebral artery  I63.411 434.11   2. Left-sided weakness  R53.1 728.87       Patient Active Problem List   Diagnosis    Cigarette nicotine dependence without complication    Transient ischemic attack    Chronic back pain    Vertigo    Primary hypertension    CVA (cerebral vascular accident)    Cerebral aneurysm, nonruptured    Carotid artery stenosis with cerebral infarction    Stenosis of cerebral artery                        PT Assessment/Plan       Row Name 02/10/25 1030          PT Assessment    Functional Limitations Decreased safety during functional activities;Impaired gait;Limitations in functional capacity and performance  -RO     Impairments Balance;Cognition;Coordination;Endurance;Gait;Muscle strength  -RO     Assessment Comments Patient progressing with tolerating increase in weight with exercises today.  -RO     Rehab Potential Good  -RO     Patient/caregiver participated in establishment of treatment plan and goals Yes  -RO     Patient would benefit from skilled therapy intervention Yes  -RO        PT Plan    PT Plan Comments continue per POC  -RO               User Key  (r) = Recorded By, (t) = Taken By, (c) = Cosigned By      Initials Name Provider Type    RO Radha Chaudhari, PT Physical Therapist                        OP Exercises       Row Name 02/10/25 1030             Subjective    Subjective Comments Patient states late to appt secondary to MD appt in [place] this morning  -RO         Subjective Pain    Able to rate subjective pain? yes  -RO      Pre-Treatment Pain Level 0  -RO      Post-Treatment Pain Level 0  -RO         Total Minutes    81884 - PT Therapeutic Exercise Minutes 20  -RO      18161 - PT  Therapeutic Activity Minutes 10  -RO         Exercise 1    Exercise Name 1 wrist curls 3 ways  -RO      Cueing 1 Verbal;Tactile;Demo  -RO      Sets 1 3lbs  -RO      Reps 1 2x10 each direction  -RO      Time 1 Left  -RO         Exercise 2    Exercise Name 2 recumbent bike  -RO      Cueing 2 Verbal  -RO      Time 2 not today  -RO         Exercise 3    Exercise Name 3 UBE  -RO      Cueing 3 Tactile  -RO      Sets 3 x2 min forward  -RO      Reps 3 x2 min reverse  -RO         Exercise 4    Exercise Name 4 cross over stepping  -RO      Cueing 4 Verbal;Tactile;Demo  -RO      Sets 4 x30 ft each direction  -RO      Reps 4 SBA  -RO         Exercise 5    Exercise Name 5 tandem stepping  -RO      Cueing 5 Verbal;Tactile;Demo  -RO      Sets 5 30ft x1  -RO      Reps 5 SBA  -RO         Exercise 6    Exercise Name 6 bridges with hip add/ball squeeze  -RO      Cueing 6 Verbal;Tactile  -RO      Reps 6 not today  -RO         Exercise 7    Exercise Name 7 Digi flexion 5lbs  -RO      Cueing 7 Verbal;Tactile;Demo  -RO      Sets 7 x20 L  -RO      Time 7 --  -RO         Exercise 8    Exercise Name 8 hooklying alt UE/LE flexion  -RO      Cueing 8 Verbal;Tactile;Demo  -RO      Reps 8 not today  -RO         Exercise 9    Exercise Name 9 standing hip abduction  -RO      Cueing 9 Verbal;Demo  -RO      Sets 9 at rail  -RO      Reps 9 x20 each LE  -RO      Time 9 3lb ankle weight  -RO         Exercise 10    Exercise Name 10 standing marching  -RO      Cueing 10 Verbal;Demo  -RO      Sets 10 at rail  -RO      Reps 10 x20 reciprocal LEs  -RO      Time 10 3lb ankle weight  -RO         Exercise 11    Exercise Name 11 rows  -RO      Cueing 11 Verbal;Tactile;Demo  -RO      Sets 11 level 2  -RO      Reps 11 x20  -RO      Time 11 CGA of therapist  -RO         Exercise 12    Exercise Name 12 lat pull downs  -RO      Cueing 12 Verbal;Tactile;Demo  -RO      Reps 12 x20  -RO      Time 12 level2  -RO         Exercise 13    Exercise Name 13 standing heel/toe  raises  -RO      Cueing 13 Verbal;Demo  -RO      Sets 13 at rail  -RO      Reps 13 x20 B LE  -RO      Time 13 not today  -RO         Exercise 14    Exercise Name 14 slant board calf stretch  -RO      Cueing 14 Verbal;Tactile;Demo  -RO      Sets 14 3x20 sec hold  -RO      Time 14 not today  -RO         Exercise 15    Exercise Name 15 standing shoulder abduction  -RO      Cueing 15 Verbal;Demo;Tactile  -RO      Sets 15 x10  -RO         Exercise 16    Exercise Name 16 standing alt UE/LE flexion  -RO      Cueing 16 Verbal;Tactile;Demo  -RO      Sets 16 2x10 reciprocal  -RO         Exercise 17    Exercise Name 17 hooklying hip adduction  -RO      Cueing 17 Verbal;Tactile  -RO      Reps 17 not today  -RO         Exercise 18    Exercise Name 18 step tapping  -RO      Cueing 18 Verbal;Demo  -RO      Sets 18 x20 reciprocal  -RO      Reps 18 6 inch step  -RO         Exercise 19    Exercise Name 19 hooklying hip abduction  -RO      Cueing 19 Verbal;Tactile  -RO      Sets 19 black band  -RO      Reps 19 not today  -RO         Exercise 20    Exercise Name 20 sit to stands from chair  -RO      Cueing 20 Verbal;Tactile;Demo  -RO      Sets 20 holding 6lb ball  -RO      Reps 20 x10  -RO                User Key  (r) = Recorded By, (t) = Taken By, (c) = Cosigned By      Initials Name Provider Type    Radha Norton, PT Physical Therapist                                                   Time Calculation:   Timed Charges  32831 - PT Therapeutic Exercise Minutes: 20  22469 - PT Therapeutic Activity Minutes: 10  Total Minutes  Timed Charges Total Minutes: 30   Total Minutes: 30                 Radha Chaudhari PT  2/10/2025

## 2025-02-11 ENCOUNTER — APPOINTMENT (OUTPATIENT)
Facility: HOSPITAL | Age: 65
End: 2025-02-11
Payer: COMMERCIAL

## 2025-02-12 DIAGNOSIS — R42 DIZZINESS: ICD-10-CM

## 2025-02-12 RX ORDER — MECLIZINE HYDROCHLORIDE 25 MG/1
25 TABLET ORAL 3 TIMES DAILY PRN
Qty: 90 TABLET | Refills: 0 | Status: SHIPPED | OUTPATIENT
Start: 2025-02-12

## 2025-02-12 NOTE — TELEPHONE ENCOUNTER
Caller: Lauren Marie    Relationship: Self    Best call back number: [phone]     Requested Prescriptions:   Requested Prescriptions     Pending Prescriptions Disp Refills    meclizine (ANTIVERT) 25 MG tablet 90 tablet 0     Sig: Take 1 tablet by mouth 3 (Three) Times a Day As Needed for Dizziness.        Pharmacy where request should be sent: [facility] #[id number] - [address] & [place]  [phone] [facility] [phone]      Last office visit with prescribing clinician: 11/27/2024   Last telemedicine visit with prescribing clinician: Visit date not found   Next office visit with prescribing clinician: Visit date not found     Additional details provided by patient: PATIENT HAS 1 DAY LEFT OF MEDICATION     Does the patient have less than a 3 day supply:  [x] Yes  [] No      Hardik Todd Rep   02/12/25 08:19 EST

## 2025-02-17 ENCOUNTER — APPOINTMENT (OUTPATIENT)
Facility: HOSPITAL | Age: 65
End: 2025-02-17
Payer: COMMERCIAL

## 2025-02-17 DIAGNOSIS — I10 PRIMARY HYPERTENSION: ICD-10-CM

## 2025-02-17 DIAGNOSIS — R06.02 SHORTNESS OF BREATH: ICD-10-CM

## 2025-02-17 RX ORDER — ALBUTEROL SULFATE 90 UG/1
2 INHALANT RESPIRATORY (INHALATION) EVERY 4 HOURS PRN
Qty: 8 G | Refills: 0 | Status: SHIPPED | OUTPATIENT
Start: 2025-02-17

## 2025-02-17 RX ORDER — HYDROCHLOROTHIAZIDE 12.5 MG/1
12.5 TABLET ORAL DAILY
Qty: 30 TABLET | Refills: 0 | Status: SHIPPED | OUTPATIENT
Start: 2025-02-17

## 2025-02-17 NOTE — TELEPHONE ENCOUNTER
Caller: Lauren Marie    Relationship: Self    Best call back number: [phone]     Requested Prescriptions:   Requested Prescriptions     Pending Prescriptions Disp Refills    hydroCHLOROthiazide 12.5 MG tablet 30 tablet 0     Sig: Take 1 tablet by mouth Daily.    albuterol sulfate  (90 Base) MCG/ACT inhaler 8 g 0     Sig: Inhale 2 puffs Every 4 (Four) Hours As Needed for Wheezing.        Pharmacy where request should be sent: [facility][id number] [address] [place] - [phone]  - [phone]     Last office visit with prescribing clinician: 11/27/2024   Last telemedicine visit with prescribing clinician: Visit date not found   Next office visit with prescribing clinician: Visit date not found     Does the patient have less than a 3 day supply:  [x] Yes  [] No        Hardik Yates Rep   02/17/25 11:29 EST

## 2025-02-18 ENCOUNTER — APPOINTMENT (OUTPATIENT)
Facility: HOSPITAL | Age: 65
End: 2025-02-18
Payer: COMMERCIAL

## 2025-02-20 RX ORDER — POTASSIUM CHLORIDE 750 MG/1
10 TABLET, EXTENDED RELEASE ORAL DAILY
Qty: 30 TABLET | Refills: 1 | Status: SHIPPED | OUTPATIENT
Start: 2025-02-20

## 2025-02-20 NOTE — TELEPHONE ENCOUNTER
Caller: Lauren Marie    Relationship: Self    Best call back number:   Telephone Information:   Mobile [phone]         Requested Prescriptions:   Requested Prescriptions     Pending Prescriptions Disp Refills    potassium chloride 10 MEQ CR tablet 30 tablet 1     Sig: Take 1 tablet by mouth Daily.        Pharmacy where request should be sent: [facility] #[id number] - [address] & [place]  [phone] [facility] [phone]      Last office visit with prescribing clinician: 11/27/2024   Last telemedicine visit with prescribing clinician: Visit date not found   Next office visit with prescribing clinician: Visit date not found     Additional details provided by patient:      THE PATIENT IS OUT OF THIS MEDICATION        Does the patient have less than a 3 day supply:  [x] Yes  [] No    Would you like a call back once the refill request has been completed: [] Yes [x] No    If the office needs to give you a call back, can they leave a voicemail: [] Yes [] No    Hardik Montano Rep   02/20/25 12:03 EST

## 2025-02-21 NOTE — ASSESSMENT & PLAN NOTE
Patient has chronic right cervical carotid occlusion and moderate right supraclinoid ICA stenosis.  She is on aspirin and Plavix currently and I recommend that she consider starting a statin and I will defer to her PCP regarding management of that.    We will follow-up with a 1 year CTA  Orders:    CT Angiogram Head With Contrast; Future    CT Angiogram Neck; Future

## 2025-02-21 NOTE — ASSESSMENT & PLAN NOTE
Notably, the patient has 2 aneurysms, right by MCA bifurcation aneurysm measuring 3.1 x 3.34.  This would be not amenable to endovascular treatment and is too small for treatment regardless.  There is a small wide necked 1-1/2 x 1 mm aneurysm at the left MCA bifurcation.  As we will be following her iCAD with CTAs, we will repeat the CTA at the 1 year roland and also check for change in these aneurysms.  Orders:    CT Angiogram Head With Contrast; Future    CT Angiogram Neck; Future

## 2025-02-24 ENCOUNTER — APPOINTMENT (OUTPATIENT)
Facility: HOSPITAL | Age: 65
End: 2025-02-24
Payer: COMMERCIAL

## 2025-02-25 ENCOUNTER — APPOINTMENT (OUTPATIENT)
Facility: HOSPITAL | Age: 65
End: 2025-02-25
Payer: COMMERCIAL

## 2025-02-26 RX ORDER — CLOPIDOGREL BISULFATE 75 MG/1
75 TABLET ORAL DAILY
Qty: 30 TABLET | Refills: 3 | Status: SHIPPED | OUTPATIENT
Start: 2025-02-26

## 2025-02-26 NOTE — TELEPHONE ENCOUNTER
Caller: Lauren Marie    Relationship: Self    Best call back number: [phone]     Requested Prescriptions:   Requested Prescriptions     Pending Prescriptions Disp Refills    clopidogrel (PLAVIX) 75 MG tablet 30 tablet 3     Sig: Take 1 tablet by mouth Daily.        Pharmacy where request should be sent: [id number] - [address] AT [facility] & [address] - [phone] [facility] [phone]      Last office visit with prescribing clinician: 11/27/2024   Last telemedicine visit with prescribing clinician: Visit date not found   Next office visit with prescribing clinician: Visit date not found     Additional details provided by patient: PATIENT IS REQUESTING A 90 DAY SUPPLY    Does the patient have less than a 3 day supply:  [x] Yes  [] No    Would you like a call back once the refill request has been completed: [] Yes [x] No    If the office needs to give you a call back, can they leave a voicemail: [] Yes [] No    Hardik Cordova Rep   02/26/25 08:30 EST

## 2025-03-03 ENCOUNTER — APPOINTMENT (OUTPATIENT)
Facility: HOSPITAL | Age: 65
End: 2025-03-03
Payer: COMMERCIAL

## 2025-03-04 ENCOUNTER — APPOINTMENT (OUTPATIENT)
Facility: HOSPITAL | Age: 65
End: 2025-03-04
Payer: COMMERCIAL

## 2025-03-06 ENCOUNTER — HOSPITAL ENCOUNTER (OUTPATIENT)
Facility: HOSPITAL | Age: 65
Setting detail: THERAPIES SERIES
Discharge: HOME | End: 2025-03-06
Payer: COMMERCIAL

## 2025-03-06 DIAGNOSIS — I63.411 CEREBROVASCULAR ACCIDENT (CVA) DUE TO EMBOLISM OF RIGHT MIDDLE CEREBRAL ARTERY: Primary | ICD-10-CM

## 2025-03-06 DIAGNOSIS — R53.1 LEFT-SIDED WEAKNESS: ICD-10-CM

## 2025-03-06 PROCEDURE — 97530 THERAPEUTIC ACTIVITIES: CPT | Performed by: PHYSICAL THERAPIST

## 2025-03-06 PROCEDURE — 97110 THERAPEUTIC EXERCISES: CPT | Performed by: PHYSICAL THERAPIST

## 2025-03-06 NOTE — THERAPY PROGRESS REPORT/RE-CERT
Physical Therapy Progress Note  [address] [place] [address]    Patient: Lauren Marie   : 1960  Referring practitioner: LEVI Jordan  Date of Initial Visit: Type: THERAPY  Episode: Right MCA CVA  Today's Date: 3/6/2025  Patient seen for 10 sessions           Subjective   Lauren Marie reports: still difficult to lift/grasp with left hand.     Objective          Active Range of Motion   Left Shoulder   Normal active range of motion    Right Shoulder   Normal active range of motion    Left Wrist   Normal active range of motion    Right Wrist   Normal active range of motion  Left Hip   Normal active range of motion    Right Hip   Normal active range of motion  Left Knee   Normal active range of motion    Right Knee   Normal active range of motion    Strength/Myotome Testing     Left Shoulder     Planes of Motion   Flexion: 4   Extension: 4   Abduction: 4     Right Shoulder   Normal muscle strength    Left Wrist/Hand   Wrist extension: 3+  Wrist flexion: 3+    Left Hip   Planes of Motion   Flexion: 4+  Extension: 4+  Abduction: 4+    Right Hip   Planes of Motion   Flexion: 4+  Extension: 4+  Abduction: 4+    Left Knee   Flexion: 4+  Extension: 4+    Right Knee   Flexion: 4+  Extension: 4+     General Comments     Wrist/Hand Comments  Patient demonstrates difficulty coordinating left hand/wrist movements     See Exercise, Manual, and Modality Logs for complete treatment.       Assessment & Plan       Assessment  Impairments: abnormal coordination, abnormal gait, abnormal muscle firing, activity intolerance, impaired balance, impaired physical strength and lacks appropriate home exercise program   Functional limitations: walking, standing, stooping and unable to perform repetitive tasks   Assessment details: Patient demonstrates improvement with therapy, but still having deficits and would benefit from continued skilled physical therapy. Patient's balance and ambulation better, no longer using  straight cane for short distances. Patient still has left arm/hand deficits, but progressing.   Prognosis: good    Goals  Plan Goals:  1. Mobility: Walking/Moving Around Functional Limitation     LTG 1:12 weeks:  The patient will demonstrate 6% limitation by achieving a score of 75/80 on the Lower Extremity Functional Scale.   STATUS:  progressing  STG 1a: 6 weeks:  The patient will demonstrate 12% limitation by achieving a score of 70/80 on the Lower Extremity Functional Scale.     STATUS: Met     2. The patient demonstrates weakness of the left hip/knee/wrist/shoulder.   LTG 2: 12weeks:  The patient will demonstrate 5/5 strength for left hip flexion, abduction, and extension in order to improve hip stability.   STATUS:  progressing  STG 2a: 6weeks:  The patient will demonstrate 4+/5 strength for left wrist flexion, and extension and left shoulder  STATUS: Met     3. The patient has gait dysfunction.   LTG 3: 12 weeks:  The patient will ambulate without assistive device, independently, for community distances with minimal limp to the left lower extremity in order to improve mobility and allow patient to perform activities such as grocery shopping with greater ease.   STATUS:  Met  STG 3a: The patient will be independent in [facility].   STATUS:  Ongoing    Plan  Therapy options: will be seen for skilled therapy services  Planned therapy interventions: manual therapy, strengthening, stretching, therapeutic activities, balance/weight-bearing training, flexibility, functional ROM exercises, home exercise program and gait training  Frequency: 2x week  Duration in weeks: 4  Treatment plan discussed with: patient        Visit Diagnoses:    ICD-10-CM ICD-9-CM   1. Cerebrovascular accident (CVA) due to embolism of right middle cerebral artery  I63.411 434.11   2. Left-sided weakness  R53.1 728.87       Progress per Plan of Care and Progress strengthening /stabilization /functional activity           Timed:  Manual Therapy:          mins  82078;  Therapeutic Exercise:    30     mins  28675;     Neuromuscular Yuri:        mins  16421;    Therapeutic Activity:     13     mins  80463;     Gait Training:           mins  07255;     Ultrasound:          mins  43129;    Electrical Stimulation:         mins  78385 ( );    Untimed:  Electrical Stimulation:         mins  84742 ( );  Mechanical Traction:         mins  43843;     Timed Treatment:   43   mins   Total Treatment:     43   mins  Radha Chaudhari PT    Electronically singed 3/6/2025      [place] PT license: [id number]  Physical Therapist

## 2025-03-07 DIAGNOSIS — R06.02 SHORTNESS OF BREATH: ICD-10-CM

## 2025-03-07 RX ORDER — ATORVASTATIN CALCIUM 80 MG/1
80 TABLET, FILM COATED ORAL NIGHTLY
Qty: 30 TABLET | Refills: 0 | Status: SHIPPED | OUTPATIENT
Start: 2025-03-07

## 2025-03-07 RX ORDER — ALBUTEROL SULFATE 90 UG/1
INHALANT RESPIRATORY (INHALATION)
Qty: 18 G | Refills: 1 | Status: SHIPPED | OUTPATIENT
Start: 2025-03-07

## 2025-03-10 ENCOUNTER — APPOINTMENT (OUTPATIENT)
Facility: HOSPITAL | Age: 65
End: 2025-03-10
Payer: COMMERCIAL

## 2025-03-10 ENCOUNTER — TELEPHONE (OUTPATIENT)
Facility: HOSPITAL | Age: 65
End: 2025-03-10
Payer: COMMERCIAL

## 2025-03-10 NOTE — TELEPHONE ENCOUNTER
Therapist called patient in regards to no show to scheduled appointment. Talked with patient's spouse (caregiver/transportation). They forgot the appointment, but state will attend next scheduled appt on 3/17/25 at 1:00 pm for speech therapy and 1:45 for physical therapy.

## 2025-03-11 ENCOUNTER — TELEPHONE (OUTPATIENT)
Dept: FAMILY MEDICINE CLINIC | Facility: CLINIC | Age: 65
End: 2025-03-11
Payer: COMMERCIAL

## 2025-03-11 ENCOUNTER — APPOINTMENT (OUTPATIENT)
Facility: HOSPITAL | Age: 65
End: 2025-03-11
Payer: COMMERCIAL

## 2025-03-11 ENCOUNTER — TRANSCRIBE ORDERS (OUTPATIENT)
Age: 65
End: 2025-03-11
Payer: COMMERCIAL

## 2025-03-11 DIAGNOSIS — I65.23 OCCLUSION AND STENOSIS OF BILATERAL CAROTID ARTERIES: Primary | ICD-10-CM

## 2025-03-11 RX ORDER — ATORVASTATIN CALCIUM 80 MG/1
80 TABLET, FILM COATED ORAL NIGHTLY
Qty: 30 TABLET | Refills: 0 | Status: SHIPPED | OUTPATIENT
Start: 2025-03-11

## 2025-03-11 NOTE — TELEPHONE ENCOUNTER
Spoke with patient, she states pharmacy told her the inhaler would cost $26.  I tried to do PA on cover my meds.  Spoke with pharmacy, they states they ran the Ventolin brand and it was covered.  She states they did not receive the atorvastatin, I sent that again for patient.  Patient is aware of all the above.

## 2025-03-11 NOTE — TELEPHONE ENCOUNTER
Caller: Lauren Marie    Relationship: Self    Best call back number: [phone]    Which medication are you concerned about: HAVING ISSUES WITH MEDICATION THAT WAS SENT OVER ON 3/7 NEEDS TO DISCUSS WITH SOMEONE.

## 2025-03-17 ENCOUNTER — APPOINTMENT (OUTPATIENT)
Facility: HOSPITAL | Age: 65
End: 2025-03-17
Payer: COMMERCIAL

## 2025-03-17 ENCOUNTER — HOSPITAL ENCOUNTER (OUTPATIENT)
Facility: HOSPITAL | Age: 65
Setting detail: THERAPIES SERIES
Discharge: HOME | End: 2025-03-17
Payer: COMMERCIAL

## 2025-03-17 DIAGNOSIS — R53.1 LEFT-SIDED WEAKNESS: ICD-10-CM

## 2025-03-17 DIAGNOSIS — I63.411 CEREBROVASCULAR ACCIDENT (CVA) DUE TO EMBOLISM OF RIGHT MIDDLE CEREBRAL ARTERY: Primary | ICD-10-CM

## 2025-03-17 DIAGNOSIS — R41.841 COGNITIVE COMMUNICATION DEFICIT: Primary | ICD-10-CM

## 2025-03-17 PROCEDURE — 97110 THERAPEUTIC EXERCISES: CPT | Performed by: PHYSICAL THERAPIST

## 2025-03-17 PROCEDURE — 97530 THERAPEUTIC ACTIVITIES: CPT | Performed by: PHYSICAL THERAPIST

## 2025-03-17 NOTE — THERAPY PROGRESS REPORT/RE-CERT
Re-Assessment / Re-Certification      Patient: Lauren Marie   : 1960  Diagnosis/ICD-10 Code:  Cerebrovascular accident (CVA) due to embolism of right middle cerebral artery   Referring practitioner: LEVI Jordan  Date of Initial Visit: Type: THERAPY  Episode: Right MCA CVA  Today's Date: 3/17/2025  Patient seen for 11 sessions      Subjective:   Lauren Marie reports: no falls, but still having difficulty with left hand stiffness/weakness and at times drops objects  Subjective Questionnaire:   Clinical Progress: improved  Home Program Compliance: Yes  Treatment has included: therapeutic exercise, neuromuscular re-education, therapeutic activity, and gait training    Subjective   Objective          Active Range of Motion   Left Shoulder   Normal active range of motion    Right Shoulder   Normal active range of motion    Left Wrist   Normal active range of motion    Right Wrist   Normal active range of motion  Left Hip   Normal active range of motion    Right Hip   Normal active range of motion  Left Knee   Normal active range of motion    Right Knee   Normal active range of motion    Strength/Myotome Testing     Left Shoulder     Planes of Motion   Flexion: 4   Extension: 4   Abduction: 4     Right Shoulder   Normal muscle strength    Left Wrist/Hand   Wrist extension: 4-  Wrist flexion: 4-     (2nd hand position)   Left  strength (2nd hand position) 40 lbs    Right Wrist/Hand      (2nd hand position)   Right  strength (2nd hand position) 60 lbs    Left Hip   Planes of Motion   Flexion: 4+  Extension: 4+  Abduction: 4+    Right Hip   Planes of Motion   Flexion: 4+  Extension: 4+  Abduction: 4+    Left Knee   Flexion: 4+  Extension: 4+    Right Knee   Flexion: 4+  Extension: 4+     General Comments     Wrist/Hand Comments  Patient demonstrates difficulty coordinating left hand/wrist movements; especially pinky finger (5th digit)     Assessment & Plan       Assessment  Impairments:  abnormal coordination, abnormal gait, abnormal muscle firing, activity intolerance, impaired balance, impaired physical strength and lacks appropriate home exercise program   Functional limitations: walking, standing, stooping and unable to perform repetitive tasks   Assessment details: Patient demonstrates improvement with therapy, but still having deficits and would benefit from continued skilled physical therapy. Patient's balance and ambulation better, no longer using straight cane for short distances. Patient still has left arm/hand deficits, but progressing.   Prognosis: good    Goals  Plan Goals:  1. Mobility: Walking/Moving Around Functional Limitation     LTG 1:12 weeks:  The patient will demonstrate 6% limitation by achieving a score of 75/80 on the Lower Extremity Functional Scale.   STATUS:  progressing  STG 1a: 6 weeks:  The patient will demonstrate 12% limitation by achieving a score of 70/80 on the Lower Extremity Functional Scale.     STATUS: Met     2. The patient demonstrates weakness of the left hip/knee/wrist/shoulder.   LTG 2: 12weeks:  The patient will demonstrate 5/5 strength for left hip flexion, abduction, and extension in order to improve hip stability.   STATUS:  progressing  STG 2a: 6weeks:  The patient will demonstrate 4+/5 strength for left wrist flexion, and extension and left shoulder  STATUS: Met     3. The patient has gait dysfunction.   LTG 3: 12 weeks:  The patient will ambulate without assistive device, independently, for community distances with minimal limp to the left lower extremity in order to improve mobility and allow patient to perform activities such as grocery shopping with greater ease.   STATUS:  Met  STG 3a: The patient will be independent in [facility].   STATUS:  Ongoing    Plan  Therapy options: will be seen for skilled therapy services  Planned therapy interventions: manual therapy, strengthening, stretching, therapeutic activities, balance/weight-bearing training,  flexibility, functional ROM exercises, home exercise program and gait training  Frequency: 2x week  Duration in weeks: 4  Treatment plan discussed with: patient        Visit Diagnoses:    ICD-10-CM ICD-9-CM   1. Cerebrovascular accident (CVA) due to embolism of right middle cerebral artery  I63.411 434.11   2. Left-sided weakness  R53.1 728.87       Progress toward previous goals: Partially Met      Recommendations: Continue as planned  Timeframe: 1 month  Prognosis to achieve goals: good    PT Signature: Radha Chaudhari PT    Electronically singed 3/17/2025    [place] PT license: [id number]        90 Day Recertification  Certification Period: 3/17/2025 thru 6/14/2025  I certify that the therapy services are furnished while this patient is under my care.  The services outlined above are required by this patient, and will be reviewed every 90 days.    PHYSICIAN: Adali Kim APRN  NPI: [id number]                                      DATE:     Based upon review of the patient's progress and continued therapy plan, it is my medical opinion that Lauren Marie should continue physical therapy treatment at [facility]   Signature: __________________________________  Adali Kim APRN    Timed:  Manual Therapy:         mins  59494;  Therapeutic Exercise:    30     mins  49750;     Neuromuscular Yuri:        mins  81690;    Therapeutic Activity:     12     mins  49310;     Gait Training:           mins  32549;     Ultrasound:          mins  93746;    Electrical Stimulation:         mins  07535 ( );    Untimed:  Electrical Stimulation:         mins  78531 ( );  Mechanical Traction:         mins  83569;     Timed Treatment:   42   mins   Total Treatment:     42   mins

## 2025-03-17 NOTE — THERAPY RE-EVALUATION
Outpatient Speech Language Pathology   Adult Speech Language  Re-Evaluation   [facility]     Patient Name: Lauren Marie  : 1960  MRN: [id number]  Today's Date: 3/17/2025        Visit Date: 2025   Patient Active Problem List   Diagnosis    Cigarette nicotine dependence without complication    Transient ischemic attack    Chronic back pain    Vertigo    Primary hypertension    CVA (cerebral vascular accident)    Cerebral aneurysm, nonruptured    Carotid artery stenosis with cerebral infarction    Stenosis of cerebral artery        Past Medical History:   Diagnosis Date    Aneurysm     Hypertension     Injury of back     Stroke     TIA (transient ischemic attack)     Vertigo         Past Surgical History:   Procedure Laterality Date     SECTION      THYROID SURGERY      US GUIDED FINE NEEDLE ASPIRATION  2020         Visit Dx:    ICD-10-CM ICD-9-CM   1. Cognitive communication deficit  R41.841 799.52           Report Period  Physician: Adali Kim  Date of Service: 3/17/25  Report Period from:  24  Report Period to: 3/17/25   Reason For Re-Evaluation:  90 day re-evaluation    Objective  Diagnosis: right, MCA stroke; facial droop  Treatment Diagnosis: Cognitive Communication Deficit  Today's Treatment  After re-evaluation, patient continued with speech therapy with the following  results:  No therapy secondary being out of treatment plan of care.     Date of Service  DATE: 3/17/25  Today's Visit Number Speech:      ST Functional Testing  Results  Patient rated on [facility]'s Functional Communication Measures for Problem Solving at a level 5/7with a 20-39%%  limitation.  With continued speech therapy, patient is expected to reach a Functional Communication Level of  6 decreasing Problem Solving  limitation to 1-19%.    Assessment Given    GOALS ACTIVITY PERFORMED TRIALS COMPLETED LEVEL OF CUEING REQUIRED   LTG 1: 12 weeks: Patient will increase Functional Communication Measure level to  5/7 for thought organization and problem solving skills for safe completion of IADL tasks.                            STATUS:  New                           STG 1a: 12 weeks:  Patient will complete mod complex thought organization tasks (calendar, scheduling, medicine managment, checkbook registrar,  etc)  with min to mod assist to increase organization to solve moderately complex ADL problems.  Calendar Activity  Reading a calendar and placing events on proper day     3/6    Mod assist secondary to decreased selective attention      Progressing but not met   STG 1b:12 weeks: Patient will complete mod complex  reasoning tasks (money management, counting change, time managment, deductive reasoning tasks, etc.) with min to mod assist to use appropriate strategies for ADLs.  Problem  solving  Basic ADL problem solving.  Providing functional solutions to everyday solutions Mod to max assist secondary to decreased attention and thought organization.     Progressing but not met   STG 2a: 12 weeks:  Patient will sustain attention to basic tasks for 45 minutes with min assist through redirection of sustained attention.  sustained attention    min assist        Goal met  2/4/25    STG 2b: 12 weeks.  Patient will complete divided attention tasks with min assist to attend to multiple information required to make appropriate and timely decisions for ADLS. Divided attention Attending to appointment and writing correct date, time, and event on the calendar         Mod  assist    STG 2c:  12 weeks. Patient will complete alternating attention tasks with min assist to encode information required to complete multiple tasks.  alternating between tasks  moving between tasks  mod assist with patient having difficulty organizing thought moving from task to task   STG 3a: 12 weeks: Patient will increase labial strength to a 4+/5      Labial strengthening   Min assist     Goal met   STG 3b: 12 weeks.  Patient will increase lingual strength  to a 4+/5 Lingual strengthening   Min assist     Goal met       Time: 1300-13:38-38 minutes for re-evlaution    Assessment  Rehabilitation potential:  Good  Reason for Continued Therapy:Patient experienced a break in therapy secondary to no transportation.  Patient has now secured transportation and desires to continue plan of care.   Patient requires the skills of a therapist to provide mod assist to increase attention, thought organization and problem solving for safe completion of IADLs.  There is an expectation that through continued skilled therapy, the patient will achieve the remaining goals below.    Goals  Problem: Executive Functioning Deficit 60-79% FCM 3/7        LTG 1: 12 weeks: Patient will increase Functional Communication Measure level to 6/7 for thought organization and problem solving skills for safe completion of IADL tasks.                            STATUS:  Not met, additional 12 weeks weeks to achieve goals                 STG 1a: 12 weeks:  Patient will complete mod complex thought organization tasks (calendar, scheduling, medicine managment, checkbook registrar,  etc)  with min to mod assist to increase organization to solve moderately complex ADL problems.              STATUS:Not met, additional 12 weeks weeks to achieve goals              STG 1b:12 weeks: Patient will complete mod complex  reasoning tasks (money management, counting change, time managment, deductive reasoning tasks, etc.) with min to mod assist to use appropriate strategies for ADLs.               STATUS: Not met, additional 12 weeks weeks to achieve goals  Treatment:  Speech therapy using a clinician directed approach to increase safe executive functioning  skills during ADLS.     2. Attention disturbance              LTG2:  12weeks: Patient will increase attention to encode information and increase accurate processing of information for min to mod complex decision making skills during ADLS.                   STG 2a: 12  weeks:  Patient will sustain attention to basic tasks for 45 minutes with min assist through redirection of sustained attention.                          STATUS:  Met 2/4/25              STG 2b: 12 weeks.  Patient will complete divided attention tasks with min assist to attend to multiple information required to make appropriate and timely decisions for ADLS.                          STATUS:Not met, additional 12 weeks weeks to achieve goals              STG 2c:  12 weeks. Patient will complete alternating attention tasks with min assist to encode information required to complete multiple tasks.                          STATUS: Not met, additional 12 weeks weeks to achieve goals  Treatment:  Speech therapy to increase attention for safe completion of IADLs.         3.Problem: Motor speech limitation                  LTG 3: 12 weeks: Patient will increase oral motor strength for safe communication of wants and needs during IADLs.               Status: Met              STG 3a: 12 weeks: Patient will increase labial strength to a 4+/5                   Status: Met              STG 3b: 12 weeks.  Patient will increase lingual strength to a 4+/5              Status: New  Treatment:  Speech therapy to increase oral motor strength for communication.      PLAN  Frequency (Times/Week):  1 times per week  Duration (Weeks):  12 weeks    Marisela Nuñez MS, CCC-SLP    ST SIGNATURE; Marisela Nuñez SLP    Electronically signed 3/17/2025    [place] License:  - [id number]

## 2025-03-18 ENCOUNTER — APPOINTMENT (OUTPATIENT)
Facility: HOSPITAL | Age: 65
End: 2025-03-18
Payer: COMMERCIAL

## 2025-03-18 ENCOUNTER — TELEPHONE (OUTPATIENT)
Dept: FAMILY MEDICINE CLINIC | Facility: CLINIC | Age: 65
End: 2025-03-18
Payer: COMMERCIAL

## 2025-03-18 DIAGNOSIS — I10 PRIMARY HYPERTENSION: ICD-10-CM

## 2025-03-18 NOTE — TELEPHONE ENCOUNTER
Patient is requesting a refill on medication Hydrochlorothiaxide 12.5mg. She has 3 pills left. Thanks

## 2025-03-19 RX ORDER — HYDROCHLOROTHIAZIDE 12.5 MG/1
12.5 TABLET ORAL DAILY
Qty: 30 TABLET | Refills: 5 | Status: SHIPPED | OUTPATIENT
Start: 2025-03-19

## 2025-03-25 ENCOUNTER — HOSPITAL ENCOUNTER (OUTPATIENT)
Facility: HOSPITAL | Age: 65
Setting detail: THERAPIES SERIES
Discharge: HOME | End: 2025-03-25
Payer: COMMERCIAL

## 2025-03-25 DIAGNOSIS — R53.1 LEFT-SIDED WEAKNESS: ICD-10-CM

## 2025-03-25 DIAGNOSIS — I63.411 CEREBROVASCULAR ACCIDENT (CVA) DUE TO EMBOLISM OF RIGHT MIDDLE CEREBRAL ARTERY: Primary | ICD-10-CM

## 2025-03-25 DIAGNOSIS — R41.841 COGNITIVE COMMUNICATION DEFICIT: Primary | ICD-10-CM

## 2025-03-25 PROCEDURE — 97530 THERAPEUTIC ACTIVITIES: CPT | Performed by: PHYSICAL THERAPIST

## 2025-03-25 PROCEDURE — 97129 THER IVNTJ 1ST 15 MIN: CPT | Performed by: SPEECH-LANGUAGE PATHOLOGIST

## 2025-03-25 PROCEDURE — 97110 THERAPEUTIC EXERCISES: CPT | Performed by: PHYSICAL THERAPIST

## 2025-03-25 PROCEDURE — 97130 THER IVNTJ EA ADDL 15 MIN: CPT | Performed by: SPEECH-LANGUAGE PATHOLOGIST

## 2025-03-25 NOTE — THERAPY TREATMENT NOTE
Outpatient Physical Therapy Neuro Treatment Note   [facility]     Patient Name: Lauren Marie  : 1960  MRN: [id number]  Today's Date: 3/25/2025      Visit Date: 2025    Visit Dx:    ICD-10-CM ICD-9-CM   1. Cerebrovascular accident (CVA) due to embolism of right middle cerebral artery  I63.411 434.11   2. Left-sided weakness  R53.1 728.87       Patient Active Problem List   Diagnosis    Cigarette nicotine dependence without complication    Transient ischemic attack    Chronic back pain    Vertigo    Primary hypertension    CVA (cerebral vascular accident)    Cerebral aneurysm, nonruptured    Carotid artery stenosis with cerebral infarction    Stenosis of cerebral artery                        PT Assessment/Plan       Row Name 25 1320          PT Assessment    Functional Limitations Decreased safety during functional activities;Impaired gait;Limitations in functional capacity and performance  -RO     Impairments Balance;Cognition;Coordination;Endurance;Gait;Muscle strength  -RO     Assessment Comments Patient reporting more soreness in left hip/knee today.  -RO     Rehab Potential Good  -RO     Patient/caregiver participated in establishment of treatment plan and goals Yes  -RO     Patient would benefit from skilled therapy intervention Yes  -RO        PT Plan    PT Plan Comments continue per POC  -RO               User Key  (r) = Recorded By, (t) = Taken By, (c) = Cosigned By      Initials Name Provider Type    RO Radha Chaudhari, PT Physical Therapist                        OP Exercises       Row Name 25 1320             Subjective    Subjective Comments Patient states left leg still feels a little weak.  -RO         Subjective Pain    Able to rate subjective pain? yes  -RO      Pre-Treatment Pain Level 0  -RO      Post-Treatment Pain Level 0  -RO         Total Minutes    00838 - PT Therapeutic Exercise Minutes 30  -RO      12813 - PT Therapeutic Activity Minutes 10  -RO          Exercise 1    Exercise Name 1 wrist curls 3 ways  -RO      Cueing 1 Verbal;Tactile;Demo  -RO      Sets 1 4lbs  -RO      Reps 1 2x10 each direction  -RO      Time 1 Left  -RO         Exercise 2    Exercise Name 2 recumbent bike  -RO      Cueing 2 Verbal  -RO      Sets 2 level 2  -RO      Time 2 x4 min  -RO         Exercise 3    Exercise Name 3 UBE  -RO      Cueing 3 Tactile  -RO      Sets 3 x2 min forward  -RO      Reps 3 x2 min reverse  -RO         Exercise 4    Exercise Name 4 cross over stepping  -RO      Cueing 4 Verbal;Tactile;Demo  -RO      Sets 4 x20 ft each direction  -RO      Reps 4 SBA  -RO         Exercise 5    Exercise Name 5 tandem stepping  -RO      Cueing 5 Verbal;Tactile;Demo  -RO      Sets 5 20ft x1  -RO      Reps 5 SBA  -RO         Exercise 6    Exercise Name 6 bridges with hip add/ball squeeze  -RO      Cueing 6 Verbal;Tactile  -RO      Reps 6 x10  -RO         Exercise 7    Exercise Name 7 Digi flexion 9lbs  -RO      Cueing 7 Verbal;Tactile;Demo  -RO      Sets 7 x20 L  -RO         Exercise 8    Exercise Name 8 hooklying alt UE/LE flexion  -RO      Cueing 8 Verbal;Tactile;Demo  -RO      Reps 8 not today  -RO         Exercise 9    Exercise Name 9 standing hip abduction  -RO      Cueing 9 Verbal;Demo  -RO      Sets 9 at rail  -RO      Reps 9 x20 each LE  -RO      Time 9 3lb ankle weight  -RO         Exercise 10    Exercise Name 10 standing marching  -RO      Cueing 10 Verbal;Demo  -RO      Sets 10 at rail  -RO      Reps 10 x20 reciprocal LEs  -RO      Time 10 3lb ankle weight  -RO         Exercise 11    Exercise Name 11 rows  -RO      Cueing 11 Verbal;Tactile;Demo  -RO      Sets 11 level 2  -RO      Reps 11 x20  -RO      Time 11 CGA of therapist  -RO         Exercise 12    Exercise Name 12 lat pull downs  -RO      Cueing 12 Verbal;Tactile;Demo  -RO      Reps 12 x20  -RO      Time 12 level2  -RO         Exercise 13    Exercise Name 13 standing heel/toe raises  -RO      Cueing 13 Verbal;Demo  -RO       Sets 13 at rail  -RO      Reps 13 x20 B LE  -RO      Time 13 3lb ankle weights  -RO         Exercise 14    Exercise Name 14 grasping with ball  -RO      Cueing 14 Verbal;Tactile;Demo  -RO      Sets 14 x20  -RO      Reps 14 Left hand  -RO         Exercise 15    Exercise Name 15 standing B shoulder scaption/abduction  -RO      Cueing 15 Verbal;Demo;Tactile  -RO      Sets 15 2 x10  -RO         Exercise 16    Exercise Name 16 standing alt UE/LE flexion  -RO      Cueing 16 Verbal;Tactile;Demo  -RO      Sets 16 x10 each side  -RO         Exercise 17    Exercise Name 17 hooklying hip adduction  -RO      Cueing 17 Verbal;Tactile  -RO      Reps 17 not today  -RO         Exercise 18    Exercise Name 18 step tapping  -RO      Cueing 18 Verbal;Demo  -RO      Sets 18 x20 reciprocal  -RO      Reps 18 6 inch step  -RO         Exercise 19    Exercise Name 19 hooklying hip abduction  -RO      Cueing 19 Verbal;Tactile  -RO      Sets 19 black band  -RO      Reps 19 not today  -RO         Exercise 20    Exercise Name 20 sit to stands from chair  -RO      Cueing 20 Verbal;Tactile;Demo  -RO      Sets 20 holding 8lb ball  -RO      Reps 20 x10  -RO                User Key  (r) = Recorded By, (t) = Taken By, (c) = Cosigned By      Initials Name Provider Type    RO Radha Chaudhari, PT Physical Therapist                                                   Time Calculation:   Timed Charges  96527 - PT Therapeutic Exercise Minutes: 30  13193 - PT Therapeutic Activity Minutes: 10  Total Minutes  Timed Charges Total Minutes: 40   Total Minutes: 40                 Radha Chaudhari PT  3/25/2025

## 2025-03-25 NOTE — THERAPY TREATMENT NOTE
Outpatient Adult Speech Language Therapy           Treatment Note    Patient: Lauren Marie                                                                                     Visit Date: 3/25/2025  :     1960    Referring practitioner:    LEVI Jordan  Date of Initial Visit:          Type: THERAPY  Episode: Right MCA CVA; Facial droop    Patient seen for 12 sessions    Visit Diagnoses:    ICD-10-CM ICD-9-CM   1. Cognitive communication deficit  R41.841 799.52     SUBJECTIVE   Patient is here today to increase executive functioning skills for safe completion of IADLs.     Education:POC, use of calendar, caregiver instructions,   OBJECTIVE   GOALS         Assessment Given     GOALS ACTIVITY PERFORMED TRIALS COMPLETED LEVEL OF CUEING REQUIRED   LTG 1: 12 weeks: Patient will increase Functional Communication Measure level to 5/7 for thought organization and problem solving skills for safe completion of IADL tasks.                            STATUS:  New                           STG 1a: 12 weeks:  Patient will complete mod complex thought organization tasks (calendar, scheduling, medicine managment, checkbook registrar,  etc)  with min to mod assist to increase organization to solve moderately complex ADL problems. Thought organization Reading a menu, categories within the menu (headings), answering questions by referring to the correct section Mod assist   STG 1b:12 weeks: Patient will complete mod complex  reasoning tasks (money management, counting change, time managment, deductive reasoning tasks, etc.) with min to mod assist to use appropriate strategies for ADLs.       STG 2a: 12 weeks:  Patient will sustain attention to basic tasks for 45 minutes with min assist through redirection of sustained attention.              Goal met  25    STG 2b: 12 weeks.  Patient will complete divided attention tasks with min assist to  attend to multiple information required to make appropriate and timely decisions for ADLS. Divided attention Reading a menu and writing down information to questions Mod  assist    STG 2c:  12 weeks. Patient will complete alternating attention tasks with min assist to encode information required to complete multiple tasks.  alternating between tasks     STG 3a: 12 weeks: Patient will increase labial strength to a 4+/5              Goal met   STG 3b: 12 weeks.  Patient will increase lingual strength to a 4+/5         Goal met          Time: 14:05-14:48=43 minutes      ASSESSMENT/PLAN     ASSESSMENT: Patient requires the skills of a therapist to provide moderate assist increase thought organziation, reasoning and attention to promote safe completion of IADLs.   Progress:   PLAN: continue plan of care    Progress Note Due Date:4/17/25  Recertification Due Date:6/15/25    SIGNATURE: Marisela Nuñez, SLP, [place] License #: [id number]  Electronically Signed on 3/25/2025            Adult Outpatient Rehabiliation

## 2025-03-27 ENCOUNTER — OFFICE VISIT (OUTPATIENT)
Dept: FAMILY MEDICINE CLINIC | Facility: CLINIC | Age: 65
End: 2025-03-27
Payer: COMMERCIAL

## 2025-03-27 VITALS
SYSTOLIC BLOOD PRESSURE: 140 MMHG | WEIGHT: 171 LBS | HEART RATE: 85 BPM | TEMPERATURE: 98.4 F | HEIGHT: 65 IN | BODY MASS INDEX: 28.49 KG/M2 | OXYGEN SATURATION: 96 % | DIASTOLIC BLOOD PRESSURE: 91 MMHG

## 2025-03-27 DIAGNOSIS — Z12.11 ENCOUNTER FOR SCREENING FOR MALIGNANT NEOPLASM OF COLON: ICD-10-CM

## 2025-03-27 DIAGNOSIS — Z13.29 SCREENING FOR THYROID DISORDER: ICD-10-CM

## 2025-03-27 DIAGNOSIS — Z23 NEED FOR VACCINATION: ICD-10-CM

## 2025-03-27 DIAGNOSIS — I63.412 CEREBROVASCULAR ACCIDENT (CVA) DUE TO EMBOLISM OF LEFT MIDDLE CEREBRAL ARTERY: ICD-10-CM

## 2025-03-27 DIAGNOSIS — Z00.00 ANNUAL PHYSICAL EXAM: Primary | ICD-10-CM

## 2025-03-27 DIAGNOSIS — E78.5 HYPERLIPIDEMIA, UNSPECIFIED HYPERLIPIDEMIA TYPE: ICD-10-CM

## 2025-03-27 DIAGNOSIS — Z12.31 ENCOUNTER FOR SCREENING MAMMOGRAM FOR MALIGNANT NEOPLASM OF BREAST: ICD-10-CM

## 2025-03-27 NOTE — PROGRESS NOTES
Chief Complaint     Annual Exam    Patient or patient representative verbalized consent for the use of Ambient Listening during the visit with  LEVI Jordan for chart documentation. 3/27/2025  10:59 EDT    History of Present Illness     Lauren Marie is a 64 y.o. female who presents to [facility] .    History of Present Illness  The patient presents for an annual physical exam.    She reports experiencing muscle aches, which she attributes to her medication regimen. She has been managing this discomfort by halving her nightly dose of 80 mg to 40 mg, a strategy that appears to be effective. She is currently engaged in physical therapy, which she finds beneficial. She notes a significant improvement in her strength since her stroke, to the point where she is nearing her baseline functionality. Despite this progress, she expresses reluctance to discontinue physical therapy due to residual distance issues. She maintains an active lifestyle, performing household chores such as dishwashing and sweeping, and also engages in home exercises. However, she reports persistent numbness in her hand.    IMMUNIZATIONS  She received her pneumonia vaccine.         History      Past Medical History:   Diagnosis Date    Aneurysm     Hypertension     Injury of back     Stroke     TIA (transient ischemic attack)     Vertigo        Past Surgical History:   Procedure Laterality Date     SECTION      THYROID SURGERY      US GUIDED FINE NEEDLE ASPIRATION  2020       History reviewed. No pertinent family history.     Current Medications        Current Outpatient Medications:     albuterol sulfate  (90 Base) MCG/ACT inhaler, INHALE 2 PUFFS INTO THE LUNGS EVERY 4 HOURS AS NEEDED FOR WHEEZING, Disp: 18 g, Rfl: 1    amLODIPine (NORVASC) 5 MG tablet, Take 1 tablet by mouth Daily., Disp: 90 tablet, Rfl: 3    aspirin 81 MG EC tablet, Take 1 tablet by mouth Daily., Disp: 30 tablet,  "Rfl: 5    atorvastatin (LIPITOR) 80 MG tablet, Take 1 tablet by mouth Every Night., Disp: 30 tablet, Rfl: 0    clopidogrel (PLAVIX) 75 MG tablet, Take 1 tablet by mouth Daily., Disp: 30 tablet, Rfl: 3    Diclofenac Sodium (Voltaren Arthritis Pain) 1 % gel gel, Apply 4 g topically to the appropriate area as directed 3 (Three) Times a Day., Disp: 150 g, Rfl: 0    hydroCHLOROthiazide 12.5 MG tablet, Take 1 tablet by mouth Daily., Disp: 30 tablet, Rfl: 5    lidocaine (LIDODERM) 5 %, Place 1 patch on the skin as directed by provider Daily. Remove & Discard patch within 12 hours or as directed by MD, Disp: 14 each, Rfl: 0    lisinopril (PRINIVIL,ZESTRIL) 40 MG tablet, Take 1 tablet by mouth Daily., Disp: 90 tablet, Rfl: 1    meclizine (ANTIVERT) 25 MG tablet, Take 1 tablet by mouth 3 (Three) Times a Day As Needed for Dizziness., Disp: 90 tablet, Rfl: 0    methocarbamol (ROBAXIN) 750 MG tablet, Take 1 tablet by mouth 4 (Four) Times a Day As Needed for Muscle Spasms., Disp: 52 tablet, Rfl: 0    potassium chloride 10 MEQ CR tablet, Take 1 tablet by mouth Daily., Disp: 30 tablet, Rfl: 1     Allergies     No Known Allergies    Social History       Social History     Social History Narrative    Not on file       Immunizations     Immunization:  Immunization History   Administered Date(s) Administered    flucelvax quad pfs =>4 YRS 11/07/2019          Objective     Objective     Vital Signs:   /91 (BP Location: Left arm, Patient Position: Sitting, Cuff Size: Adult)   Pulse 85   Temp 98.4 °F (36.9 °C) (Temporal)   Ht 165.1 cm (65\")   Wt 77.6 kg (171 lb)   SpO2 96%   BMI 28.46 kg/m²       Physical Exam  Constitutional:       Appearance: Normal appearance.   HENT:      Nose: Nose normal.      Mouth/Throat:      Mouth: Mucous membranes are moist.   Cardiovascular:      Rate and Rhythm: Normal rate and regular rhythm.      Pulses: Normal pulses.      Heart sounds: Normal heart sounds.   Pulmonary:      Effort: Pulmonary " effort is normal.      Breath sounds: Normal breath sounds.   Skin:     General: Skin is warm and dry.   Neurological:      General: No focal deficit present.      Mental Status: She is alert and oriented to person, place, and time.   Psychiatric:         Mood and Affect: Mood normal.         Behavior: Behavior normal.         Physical Exam        Results    The following data was reviewed by: LEVI Jordan on 03/27/2025:          Results         Assessment and Plan        Assessment and Plan    Diagnoses and all orders for this visit:    1. Annual physical exam (Primary)  -     CBC & Differential; Future  -     Comprehensive Metabolic Panel; Future    2. Encounter for screening mammogram for malignant neoplasm of breast  -     Mammo Screening Digital Tomosynthesis Bilateral With CAD; Future    3. Encounter for screening for malignant neoplasm of colon  -     Cologuard - Stool, Per Rectum; Future    4. Screening for thyroid disorder  -     TSH; Future    5. Hyperlipidemia, unspecified hyperlipidemia type  -     Lipid Panel; Future    6. Need for vaccination  -     Pneumococcal Conjugate Vaccine 20-Valent All    7. Cerebrovascular accident (CVA) due to embolism of left middle cerebral artery        Assessment & Plan  1. Annual physical examination.  She is due for a mammogram, which will be scheduled. Regular laboratory tests, including CBC, CMP, cholesterol level, and thyroid function, will be conducted. She has been advised to return for these tests after a minimum fasting period of 8 hours to ensure accurate cholesterol readings.    2. Muscle pain.  She reports muscle aching, which has improved by taking 40 mg of her medication instead of 80 mg. She is advised to continue taking half the dose in the morning and half at night if needed.    3. Post-stroke weakness.  She has shown significant improvement with physical therapy and feels close to her baseline strength. She is encouraged to continue physical  therapy as long as possible.    Follow-up  The patient will follow up in 3 months.        Follow Up        Follow Up   Return in about 3 months (around 6/27/2025), or if symptoms worsen or fail to improve.  Patient was given instructions and counseling regarding her condition or for health maintenance advice. Please see specific information pulled into the AVS if appropriate.

## 2025-03-31 ENCOUNTER — APPOINTMENT (OUTPATIENT)
Facility: HOSPITAL | Age: 65
End: 2025-03-31
Payer: COMMERCIAL

## 2025-04-07 ENCOUNTER — HOSPITAL ENCOUNTER (OUTPATIENT)
Facility: HOSPITAL | Age: 65
Setting detail: THERAPIES SERIES
Discharge: HOME | End: 2025-04-07
Payer: COMMERCIAL

## 2025-04-07 DIAGNOSIS — I63.411 CEREBROVASCULAR ACCIDENT (CVA) DUE TO EMBOLISM OF RIGHT MIDDLE CEREBRAL ARTERY: Primary | ICD-10-CM

## 2025-04-07 DIAGNOSIS — R53.1 LEFT-SIDED WEAKNESS: ICD-10-CM

## 2025-04-07 PROCEDURE — 97530 THERAPEUTIC ACTIVITIES: CPT | Performed by: PHYSICAL THERAPIST

## 2025-04-07 PROCEDURE — 97110 THERAPEUTIC EXERCISES: CPT | Performed by: PHYSICAL THERAPIST

## 2025-04-07 NOTE — THERAPY TREATMENT NOTE
Outpatient Physical Therapy Neuro Treatment Note   [facility]     Patient Name: Lauren Marie  : 1960  MRN: [id number]  Today's Date: 2025      Visit Date: 2025    Visit Dx:    ICD-10-CM ICD-9-CM   1. Cerebrovascular accident (CVA) due to embolism of right middle cerebral artery  I63.411 434.11   2. Left-sided weakness  R53.1 728.87       Patient Active Problem List   Diagnosis    Cigarette nicotine dependence without complication    Transient ischemic attack    Chronic back pain    Vertigo    Primary hypertension    CVA (cerebral vascular accident)    Cerebral aneurysm, nonruptured    Carotid artery stenosis with cerebral infarction    Stenosis of cerebral artery                        PT Assessment/Plan       Row Name 25 1345          PT Assessment    Functional Limitations Decreased safety during functional activities;Impaired gait;Limitations in functional capacity and performance  -RO     Impairments Balance;Cognition;Coordination;Endurance;Gait;Muscle strength  -RO     Assessment Comments Patient demonstrating increase fatigue today with multiple rest breaks.  -RO     Rehab Potential Good  -RO     Patient/caregiver participated in establishment of treatment plan and goals Yes  -RO     Patient would benefit from skilled therapy intervention Yes  -RO        PT Plan    PT Plan Comments continue per POC  -RO               User Key  (r) = Recorded By, (t) = Taken By, (c) = Cosigned By      Initials Name Provider Type    RO Radha Chaudhari, PT Physical Therapist                        OP Exercises       Row Name 25 4731             Subjective    Subjective Comments Patient states legs are really stiff with new medication and is going to discuss with MD  -RO         Subjective Pain    Able to rate subjective pain? yes  -RO      Pre-Treatment Pain Level 0  -RO      Post-Treatment Pain Level 0  -RO         Total Minutes    06974 - PT Therapeutic Exercise Minutes 30  -RO      41416  - PT Therapeutic Activity Minutes 12  -RO         Exercise 1    Exercise Name 1 wrist curls 3 ways  -RO      Cueing 1 Verbal;Tactile;Demo  -RO      Sets 1 4lbs  -RO      Reps 1 1x10 each direction  -RO      Time 1 Left  -RO         Exercise 2    Exercise Name 2 recumbent bike  -RO      Cueing 2 Verbal  -RO      Sets 2 level 2  -RO      Time 2 x4 min  -RO         Exercise 3    Exercise Name 3 UBE  -RO      Cueing 3 Tactile  -RO      Sets 3 x2 min forward  -RO      Reps 3 x2 min reverse  -RO         Exercise 4    Exercise Name 4 cross over stepping  -RO      Cueing 4 Verbal;Tactile;Demo  -RO      Sets 4 x30 ft each direction  -RO      Reps 4 SBA  -RO         Exercise 5    Exercise Name 5 tandem stepping  -RO      Cueing 5 Verbal;Tactile;Demo  -RO      Sets 5 30ft x1  -RO      Reps 5 SBA  -RO         Exercise 6    Exercise Name 6 bridges with hip add/ball squeeze  -RO      Cueing 6 Verbal;Tactile  -RO      Reps 6 x20  -RO         Exercise 7    Exercise Name 7 Digi flexion 7lbs  -RO      Cueing 7 Verbal;Tactile;Demo  -RO      Sets 7 x20 L  -RO         Exercise 8    Exercise Name 8 hooklying alt UE/LE flexion  -RO      Cueing 8 Verbal;Tactile;Demo  -RO      Reps 8 not today  -RO         Exercise 9    Exercise Name 9 standing hip abduction  -RO      Cueing 9 Verbal;Demo  -RO      Sets 9 at rail  -RO      Reps 9 x20 each LE  -RO      Time 9 3lb ankle weight  -RO         Exercise 10    Exercise Name 10 standing marching  -RO      Cueing 10 Verbal;Demo  -RO      Sets 10 at rail  -RO      Reps 10 x20 reciprocal LEs  -RO      Time 10 3lb ankle weight  -RO         Exercise 11    Exercise Name 11 rows  -RO      Cueing 11 Verbal;Tactile;Demo  -RO      Sets 11 level 2  -RO      Reps 11 x20  -RO      Time 11 CGA of therapist  -RO         Exercise 12    Exercise Name 12 lat pull downs  -RO      Cueing 12 Verbal;Tactile;Demo  -RO      Reps 12 x20  -RO      Time 12 level2  -RO         Exercise 13    Exercise Name 13 standing heel/toe  raises  -RO      Cueing 13 Verbal;Demo  -RO      Sets 13 at rail  -RO      Reps 13 x20 B LE  -RO      Time 13 3lb ankle weights  -RO         Exercise 14    Exercise Name 14 grasping with ball  -RO      Cueing 14 Verbal;Tactile;Demo  -RO      Sets 14 Not today  -RO      Reps 14 Left hand  -RO         Exercise 15    Exercise Name 15 standing B shoulder scaption/abduction  -RO      Cueing 15 Verbal;Demo;Tactile  -RO      Sets 15 x20  -RO         Exercise 16    Exercise Name 16 standing alt UE/LE flexion  -RO      Cueing 16 Verbal;Tactile;Demo  -RO      Sets 16 x20 reciprocal  -RO         Exercise 17    Exercise Name 17 hooklying hip adduction  -RO      Cueing 17 Verbal;Tactile  -RO      Reps 17 not today  -RO         Exercise 18    Exercise Name 18 step tapping  -RO      Cueing 18 Verbal;Demo  -RO      Sets 18 x20 reciprocal  -RO      Reps 18 cone  -RO         Exercise 19    Exercise Name 19 hooklying hip abduction  -RO      Cueing 19 Verbal;Tactile  -RO      Sets 19 black band  -RO      Reps 19 not today  -RO         Exercise 20    Exercise Name 20 sit to stands from chair  -RO      Cueing 20 Verbal;Tactile;Demo  -RO      Sets 20 holding 8lb ball  -RO      Reps 20 2 x5 reps  -RO                User Key  (r) = Recorded By, (t) = Taken By, (c) = Cosigned By      Initials Name Provider Type    Radha Norton, PT Physical Therapist                                                   Time Calculation:   Timed Charges  88279 - PT Therapeutic Exercise Minutes: 30  93204 - PT Therapeutic Activity Minutes: 12  Total Minutes  Timed Charges Total Minutes: 42   Total Minutes: 42                 Radha Chaudhari PT  4/7/2025

## 2025-04-15 ENCOUNTER — HOSPITAL ENCOUNTER (OUTPATIENT)
Facility: HOSPITAL | Age: 65
Setting detail: THERAPIES SERIES
Discharge: HOME | End: 2025-04-15
Payer: COMMERCIAL

## 2025-04-15 DIAGNOSIS — R41.841 COGNITIVE COMMUNICATION DEFICIT: Primary | ICD-10-CM

## 2025-04-15 PROCEDURE — 97129 THER IVNTJ 1ST 15 MIN: CPT | Performed by: SPEECH-LANGUAGE PATHOLOGIST

## 2025-04-15 PROCEDURE — 97130 THER IVNTJ EA ADDL 15 MIN: CPT | Performed by: SPEECH-LANGUAGE PATHOLOGIST

## 2025-04-15 NOTE — THERAPY TREATMENT NOTE
Outpatient Adult Speech Language Therapy           30 Day Progress Note/Treatment    Patient: Lauren Marie                                                                                     Visit Date: 4/15/2025  :     1960    Referring practitioner:    LEVI Jordan  Date of Initial Visit:          Type: THERAPY  Episode: Right MCA CVA; Facial droop    Patient seen for 13 sessions    Visit Diagnoses:    ICD-10-CM ICD-9-CM   1. Cognitive communication deficit  R41.841 799.52     SUBJECTIVE   Patient is here today to increase executive functioning skills for safe completion of IADLs.    Treatment was based off of the activities in the Assessment of Language-Related Functional Activities (MARIA)    Patient attended therapy 2 times this month secondary to family matters needing to be resolved.     Education:POC, use of calendar, caregiver instructions,   OBJECTIVE   GOALS          Assessment Given     GOALS ACTIVITY PERFORMED TRIALS COMPLETED LEVEL OF CUEING REQUIRED   LTG 1: 12 weeks: Patient will increase Functional Communication Measure level to 5/7 for thought organization and problem solving skills for safe completion of IADL tasks.                                                      STG 1a: 12 weeks:  Patient will complete mod complex thought organization tasks (calendar, scheduling, medicine managment, checkbook registrar,  etc)  with min to mod assist to increase organization to solve moderately complex ADL problems. Thought organization Organizing information into categories  and crossing them off the list of words      23 categories, patient completed 17 of the 23 categories at home and brought them back.  She indicated she was finished with the task.            Telling what time it is:  4/10 40%   Mod to max assist    STG 1b:12 weeks: Patient will complete mod complex  reasoning tasks (money management, counting  change, time managment, deductive reasoning tasks, etc.) with min to mod assist to use appropriate strategies for ADLs.   reasoning task  counting money  60% with patient requiring 8 minutes and 8 seconds to complete 10 tasks      Solving Daily Math Problems 2/6 (calendar, triple amounts in a recipe, bill paying, tax bill due, leaving a 15% tip and calculating time)      mod assist   STG 2a: 12 weeks:  Patient will sustain attention to basic tasks for 45 minutes with min assist through redirection of sustained attention.              Goal met  2/4/25    STG 2b: 12 weeks.  Patient will complete divided attention tasks with min assist to attend to multiple information required to make appropriate and timely decisions for ADLS. Divided attention MARIA addressing an envelope    3/10 30%  Max assist to cue patient three times to make sure to write return address and copy the address exactly as written on the form.    STG 2c:  12 weeks. Patient will complete alternating attention tasks with min assist to encode information required to complete multiple tasks.  alternating between tasks Changing tasks times 4   mod to max assist   STG 3a: 12 weeks: Patient will increase labial strength to a 4+/5                Goal met   STG 3b: 12 weeks.  Patient will increase lingual strength to a 4+/5           Goal met           Time: 13:00-13:48=48 minutes      ASSESSMENT/PLAN      ASSESSMENT: Patient requires the skills of a therapist to provide moderate to max assist increase thought organziation, reasoning and attention to promote safe completion of IADLs.   Progress:   PLAN: continue plan of care     Progress Note Due Date:5/17/25  Recertification Due Date:6/15/25     SIGNATURE: Marisela Nuñez, SLP, [place] License #: [id number]  Electronically Signed on 4/15/2025            Adult Outpatient Rehabiliation

## 2025-04-18 RX ORDER — ATORVASTATIN CALCIUM 80 MG/1
80 TABLET, FILM COATED ORAL NIGHTLY
Qty: 30 TABLET | Refills: 0 | Status: SHIPPED | OUTPATIENT
Start: 2025-04-18

## 2025-04-22 ENCOUNTER — APPOINTMENT (OUTPATIENT)
Facility: HOSPITAL | Age: 65
End: 2025-04-22
Payer: COMMERCIAL

## 2025-04-22 DIAGNOSIS — R06.02 SHORTNESS OF BREATH: ICD-10-CM

## 2025-04-22 RX ORDER — POTASSIUM CHLORIDE 750 MG/1
10 TABLET, EXTENDED RELEASE ORAL DAILY
Qty: 30 TABLET | Refills: 1 | Status: SHIPPED | OUTPATIENT
Start: 2025-04-22

## 2025-04-22 RX ORDER — ALBUTEROL SULFATE 90 UG/1
2 INHALANT RESPIRATORY (INHALATION) EVERY 4 HOURS PRN
Qty: 18 G | Refills: 1 | Status: SHIPPED | OUTPATIENT
Start: 2025-04-22

## 2025-04-22 NOTE — TELEPHONE ENCOUNTER
Caller: Lauren Marie    Relationship: Self    Best call back number: [phone]     Requested Prescriptions:   Requested Prescriptions     Pending Prescriptions Disp Refills    potassium chloride 10 MEQ CR tablet 30 tablet 1     Sig: Take 1 tablet by mouth Daily.    albuterol sulfate  (90 Base) MCG/ACT inhaler 18 g 1        Pharm[id number] [address] & [place]  [phone] [facility] [phone]      Last office visit with prescribing clinician: 3/27/2025   Last telemedicine visit with prescribing clinician: Visit date not found   Next office visit with prescribing clinician: Visit date not found     Does the patient have less than a 3 day supply:  [x] Yes  [] No    Hardik Franklin Rep   04/22/25 14:15 EDT

## 2025-04-29 ENCOUNTER — HOSPITAL ENCOUNTER (OUTPATIENT)
Facility: HOSPITAL | Age: 65
Setting detail: THERAPIES SERIES
Discharge: HOME | End: 2025-04-29
Payer: COMMERCIAL

## 2025-04-29 ENCOUNTER — TELEPHONE (OUTPATIENT)
Dept: FAMILY MEDICINE CLINIC | Facility: CLINIC | Age: 65
End: 2025-04-29

## 2025-04-29 DIAGNOSIS — R41.841 COGNITIVE COMMUNICATION DEFICIT: Primary | ICD-10-CM

## 2025-04-29 PROCEDURE — 97130 THER IVNTJ EA ADDL 15 MIN: CPT | Performed by: SPEECH-LANGUAGE PATHOLOGIST

## 2025-04-29 PROCEDURE — 97129 THER IVNTJ 1ST 15 MIN: CPT | Performed by: SPEECH-LANGUAGE PATHOLOGIST

## 2025-04-29 NOTE — THERAPY DISCHARGE NOTE
Outpatient Adult Speech Language Therapy           Treatment Note and Discharge Note    Patient: Lauren Marie                                                                                     Visit Date: 2025  :     1960    Referring practitioner:    LEVI Jordan  Date of Initial Visit:          Type: THERAPY  Episode: Right MCA CVA; Facial droop    Patient seen for 14 sessions    Visit Diagnoses:    ICD-10-CM ICD-9-CM   1. Cognitive communication deficit  R41.841 799.52     SUBJECTIVE   Patient is here today to increase executive functioning skills for safe completion of IADLs.    Treatment was based off of the activities in the Assessment of Language-Related Functional Activities (MARIA)     Patient attended 2/4 sessions this month.       Education:attendance, plan of care and discharge instructions at end of therapy session today.  Patient has plateau for several sessions and patient completed the session and recommended discharge.  I educated patient about decreased attention and how it may affect her driving.    OBJECTIVE   GOALS          Assessment Given     GOALS ACTIVITY PERFORMED TRIALS COMPLETED LEVEL OF CUEING REQUIRED   LTG 1: 12 weeks: Patient will increase Functional Communication Measure level to 5/7 for thought organization and problem solving skills for safe completion of IADL tasks.                                                  Not met    STG 1a: 12 weeks:  Patient will complete mod complex thought organization tasks (calendar, scheduling, medicine managment, checkbook registrar,  etc)  with min to mod assist to increase organization to solve moderately complex ADL problems. Thought organization                Reading and understanding medicine label Writing down information in sequence to balance the checking account           4/10 40% Mod to max assist   STG 1b:12 weeks: Patient will complete mod  complex  reasoning tasks (money management, counting change, time managment, deductive reasoning tasks, etc.) with min to mod assist to use appropriate strategies for ADLs.   reasoning task                balancing a checking account.      Total assist to teach how to borrow from 100  position to convert into 10s, etc for subtraction   STG 2a: 12 weeks:  Patient will sustain attention to basic tasks for 45 minutes with min assist through redirection of sustained attention.              Goal met  2/4/25    STG 2b: 12 weeks.  Patient will complete divided attention tasks with min assist to attend to multiple information required to make appropriate and timely decisions for ADLS. Divided attention Placing payments into a spreadsheet             Reading a label and organizing pills in correct position of a pill organizing Moderate cueing to read all of the entries and correlate with the bills to check for accuracy      Mod to max assist    STG 2c:  12 weeks. Patient will complete alternating attention tasks with min assist to encode information required to complete multiple tasks.  alternating between tasks Changing tasks times 4   mod to max assist   STG 3a: 12 weeks: Patient will increase labial strength to a 4+/5                Goal met   STG 3b: 12 weeks.  Patient will increase lingual strength to a 4+/5           Goal met           Time: 13:02-13:58=56 minutes      ASSESSMENT/PLAN      ASSESSMENT: Patient requires the skills of a therapist to provide moderate to max assist increase thought organziation, reasoning and attention to promote safe completion of IADLs.   Progress: Recommend discharge  PLAN: Discharge from care     Progress Note Due Date:5/17/25  Recertification Due Date:6/15/25     SIGNATURE: Marisela Nuñez, SLP, [place] License #: [id number]  Electronically Signed on 4/29/2025            Adult Outpatient Rehabiliation                                                      Speech Discharge  Statement        Outpatient Speech Language Pathology   Adult Speech Language/Cognition Discharge     Patient Name: Lauren Marie  : 1960  MRN: [id number]  Today's Date: 2025         Visit Date: 2025        Visit Dx:    ICD-10-CM ICD-9-CM   1. Cognitive communication deficit  R41.841 799.52       Patient's History:Speech therapy for cognition    Reason for Discharge:Patient has plateaud in attention affecting executive functioning skills.        Discharge Instructions:   Patient was given attention, reasoning and thought organization tasks to complete at home.        Functional Communication Measures:  Patient is rated at discharge a level 4/7 on Functional Communication Measures for problem solving  with a 40-59% limitation.        Assessment:      GOALS ACTIVITY PERFORMED TRIALS COMPLETED LEVEL OF CUEING REQUIRED   LTG 1: 12 weeks: Patient will increase Functional Communication Measure level to 5/7 for thought organization and problem solving skills for safe completion of IADL tasks.                                                  Not met    STG 1a: 12 weeks:  Patient will complete mod complex thought organization tasks (calendar, scheduling, medicine managment, checkbook registrar,  etc)  with min to mod assist to increase organization to solve moderately complex ADL problems. Thought organization                Reading and understanding medicine label Writing down information in sequence to balance the checking account           4/10 40% Mod to max assist    Goal Not met     STG 1b:12 weeks: Patient will complete mod complex  reasoning tasks (money management, counting change, time managment, deductive reasoning tasks, etc.) with min to mod assist to use appropriate strategies for ADLs.   reasoning task                balancing a checking account.      Total assist to teach how to borrow from 100  position to convert into 10s, etc for subtraction    Goal not met   STG 2a: 12 weeks:   Patient will sustain attention to basic tasks for 45 minutes with min assist through redirection of sustained attention.              Goal met  2/4/25    STG 2b: 12 weeks.  Patient will complete divided attention tasks with min assist to attend to multiple information required to make appropriate and timely decisions for ADLS. Divided attention Placing payments into a spreadsheet             Reading a label and organizing pills in correct position of a pill organizing Moderate cueing to read all of the entries and correlate with the bills to check for accuracy      Mod to max assist    STG 2c:  12 weeks. Patient will complete alternating attention tasks with min assist to encode information required to complete multiple tasks.  alternating between tasks Changing tasks times 4   mod to max assist    Goal not met   STG 3a: 12 weeks: Patient will increase labial strength to a 4+/5                Goal met   STG 3b: 12 weeks.  Patient will increase lingual strength to a 4+/5           Goal met               Speech Therapy Goals:  See goals above.        Recommendations: Discharge             ST Plan,DC  PLAN    ST SIGNATURE: aMrisela Nuñez SLP    Electronically signed 4/29/2025    [place]  License:  [id number]

## 2025-04-29 NOTE — TELEPHONE ENCOUNTER
Zabrina from Speech therapy wanted PCP to know patient was d/c today, she has reached her maximum level she can and has plateau

## 2025-05-19 ENCOUNTER — DOCUMENTATION (OUTPATIENT)
Facility: HOSPITAL | Age: 65
End: 2025-05-19
Payer: COMMERCIAL

## 2025-05-30 RX ORDER — ATORVASTATIN CALCIUM 80 MG/1
80 TABLET, FILM COATED ORAL NIGHTLY
Qty: 30 TABLET | Refills: 0 | Status: SHIPPED | OUTPATIENT
Start: 2025-05-30

## 2025-06-16 ENCOUNTER — HOSPITAL ENCOUNTER (EMERGENCY)
Facility: HOSPITAL | Age: 65
Discharge: HOME | End: 2025-06-16
Attending: EMERGENCY MEDICINE | Admitting: EMERGENCY MEDICINE
Payer: MEDICARE

## 2025-06-16 VITALS
RESPIRATION RATE: 16 BRPM | TEMPERATURE: 97.7 F | HEART RATE: 97 BPM | OXYGEN SATURATION: 96 % | DIASTOLIC BLOOD PRESSURE: 70 MMHG | HEIGHT: 65 IN | WEIGHT: 171.08 LBS | SYSTOLIC BLOOD PRESSURE: 110 MMHG | BODY MASS INDEX: 28.5 KG/M2

## 2025-06-16 DIAGNOSIS — T63.441A BEE STING, ACCIDENTAL OR UNINTENTIONAL, INITIAL ENCOUNTER: Primary | ICD-10-CM

## 2025-06-16 PROCEDURE — 99282 EMERGENCY DEPT VISIT SF MDM: CPT

## 2025-06-16 RX ORDER — BENZOCAINE/MENTHOL 6 MG-10 MG
1 LOZENGE MUCOUS MEMBRANE 2 TIMES DAILY
Qty: 20 G | Refills: 0 | Status: SHIPPED | OUTPATIENT
Start: 2025-06-16

## 2025-06-16 NOTE — ED PROVIDER NOTES
"SHARED VISIT ATTESTATION:    This visit was performed by myself and an APC.  I personally approved the management plan/medical decision making and take responsibility for the patient management.      SHARED VISIT NOTE:    Patient is 65 y.o. year old female that presents to the ED for evaluation of bee sting.     Physical Exam    ED Course:    /70 (BP Location: Right arm, Patient Position: Sitting)   Pulse 97   Temp 97.7 °F (36.5 °C) (Oral)   Resp 16   Ht 165.1 cm (65\")   Wt 77.6 kg (171 lb 1.2 oz)   LMP  (LMP Unknown)   SpO2 96%   BMI 28.47 kg/m²       The following orders were placed and all results were independently analyzed by me:  No orders of the defined types were placed in this encounter.      Medications Given in the Emergency Department:  Medications - No data to display     ED Course:         Labs:    Lab Results (last 24 hours)       ** No results found for the last 24 hours. **             Imaging:    No Radiology Exams Resulted Within Past 24 Hours    MDM:    Procedures                         Norris Bartholomew MD  18:33 EDT  06/16/25         Norris Bartholomew MD  06/16/25 1833    "

## 2025-06-16 NOTE — DISCHARGE INSTRUCTIONS
Keep the affected area clean and dry.  Apply ice packs to the area for 15 to 20 minutes at a time.  Follow-up with your primary care provider if needed.

## 2025-06-16 NOTE — ED PROVIDER NOTES
Time: 3:29 PM EDT  Date of encounter:  2025  Independent Historian/Clinical History and Information was obtained by:   Patient    History is limited by: N/A    Chief Complaint: Bee sting      History of Present Illness:  Patient is a 65 y.o. year old female who presents to the emergency department for evaluation of bee sting to the dorsal aspect of the left hand that occurred last night.  Patient reports her hand is swollen but not painful.  Patient denies any shortness of breath, denies rash.      Patient Care Team  Primary Care Provider: Adali Kim APRN    Past Medical History:     No Known Allergies  Past Medical History:   Diagnosis Date    Aneurysm     Hypertension     Injury of back     Stroke     TIA (transient ischemic attack)     Vertigo      Past Surgical History:   Procedure Laterality Date     SECTION      THYROID SURGERY      US GUIDED FINE NEEDLE ASPIRATION  2020     No family history on file.    Home Medications:  Prior to Admission medications    Medication Sig Start Date End Date Taking? Authorizing Provider   albuterol sulfate  (90 Base) MCG/ACT inhaler Inhale 2 puffs Every 4 (Four) Hours As Needed for Wheezing. 25   Adali Kim APRN   amLODIPine (NORVASC) 5 MG tablet Take 1 tablet by mouth Daily. 24   Roman Méndez MD   aspirin 81 MG EC tablet Take 1 tablet by mouth Daily. 24   Adali Kim APRN   atorvastatin (LIPITOR) 80 MG tablet TAKE 1 TABLET BY MOUTH EVERY NIGHT 25   Adali Kim APRN   clopidogrel (PLAVIX) 75 MG tablet Take 1 tablet by mouth Daily. 25   Adali Kim APRN   Diclofenac Sodium (Voltaren Arthritis Pain) 1 % gel gel Apply 4 g topically to the appropriate area as directed 3 (Three) Times a Day. 24   Grant Wagner,    hydroCHLOROthiazide 12.5 MG tablet Take 1 tablet by mouth Daily. 3/19/25   Adali Kim APRN   lidocaine (LIDODERM) 5 % Place 1 patch on the skin as directed by  "provider Daily. Remove & Discard patch within 12 hours or as directed by MD 24   Adali Kim APRN   lisinopril (PRINIVIL,ZESTRIL) 40 MG tablet Take 1 tablet by mouth Daily. 25   Adali Kmi APRN   meclizine (ANTIVERT) 25 MG tablet Take 1 tablet by mouth 3 (Three) Times a Day As Needed for Dizziness. 25   Adali Kim APRN   methocarbamol (ROBAXIN) 750 MG tablet Take 1 tablet by mouth 4 (Four) Times a Day As Needed for Muscle Spasms. 24   Adali Kim APRN   potassium chloride 10 MEQ CR tablet Take 1 tablet by mouth Daily. 25   Adali Kim APRN        Social History:   Social History     Tobacco Use    Smoking status: Former     Current packs/day: 0.00     Average packs/day: 0.5 packs/day for 44.9 years (22.4 ttl pk-yrs)     Types: Cigarettes     Start date:      Quit date: 2024     Years since quittin.6     Passive exposure: Current    Smokeless tobacco: Never    Tobacco comments:     Quit 2 weeks ago.   Vaping Use    Vaping status: Never Used   Substance Use Topics    Alcohol use: Never    Drug use: Never         Review of Systems:  Review of Systems   Constitutional:  Negative for fever.   HENT:  Negative for sore throat.    Respiratory:  Negative for shortness of breath.    Cardiovascular:  Negative for chest pain.   Gastrointestinal:  Negative for abdominal pain.   Endocrine: Negative for polyuria.   Genitourinary:  Negative for dysuria.   Musculoskeletal:  Positive for joint swelling. Negative for neck pain.   Skin:  Negative for rash.   Neurological:  Negative for headaches.   All other systems reviewed and are negative.       Physical Exam:  /70 (BP Location: Right arm, Patient Position: Sitting)   Pulse 103   Temp 97.7 °F (36.5 °C) (Oral)   Resp 16   Ht 165.1 cm (65\")   Wt 77.6 kg (171 lb 1.2 oz)   LMP  (LMP Unknown)   SpO2 94%   BMI 28.47 kg/m²     Physical Exam  Vitals and nursing note reviewed.   Constitutional:       " Appearance: Normal appearance. She is not ill-appearing or toxic-appearing.   HENT:      Head: Normocephalic.      Nose: Nose normal.   Eyes:      Extraocular Movements: Extraocular movements intact.      Conjunctiva/sclera: Conjunctivae normal.      Pupils: Pupils are equal, round, and reactive to light.   Cardiovascular:      Rate and Rhythm: Normal rate.      Pulses: Normal pulses.   Pulmonary:      Effort: Pulmonary effort is normal.   Abdominal:      General: Abdomen is flat. There is no distension.      Palpations: Abdomen is soft.   Musculoskeletal:      Cervical back: Normal range of motion and neck supple.   Skin:     General: Skin is warm and dry.      Capillary Refill: Capillary refill takes less than 2 seconds.      Comments: Small puncture wound noted to dorsal aspect of the left hand over the distal third metacarpal.  Swelling noted to the hand and wrist, mild erythema surrounding the puncture wound, however no induration.    Neurological:      General: No focal deficit present.      Mental Status: She is alert and oriented to person, place, and time.   Psychiatric:         Mood and Affect: Mood normal.                  Medical Decision Making:      Comorbidities that affect care:    Hypertension, Thyroid Disease    External Notes reviewed:    Previous Clinic Note: Family medicine office visit from 3/27/2025      The following orders were placed and all results were independently analyzed by me:  No orders of the defined types were placed in this encounter.      Medications Given in the Emergency Department:  Medications - No data to display     ED Course:         Labs:    Lab Results (last 24 hours)       ** No results found for the last 24 hours. **             Imaging:    No Radiology Exams Resulted Within Past 24 Hours      Differential Diagnosis and Discussion:    Extremity Pain: Differential diagnosis includes but is not limited to soft tissue sprain, tendonitis, tendon injury, dislocation,  fracture, deep vein thrombosis, arterial insufficiency, osteoarthritis, bursitis, and ligamentous damage.    PROCEDURES:        No orders to display       Procedures    MDM                     Patient Care Considerations:    ANTIBIOTICS: I considered prescribing antibiotics as an outpatient however no bacterial focus of infection was found.      Consultants/Shared Management Plan:    SHARED VISIT: I have discussed the case with my supervising physician, Dr. Bartholomew who states he agrees with plan of care. The substantive portion of the medical decision was made by the attesting physician who made or approve the management plan and will take responsibility for the patient.  Clinical findings were discussed and ultimate disposition was made in consult with supervising physician.    Social Determinants of Health:    Patient is independent, reliable, and has access to care.       Disposition and Care Coordination:    Discharged: The patient is suitable and stable for discharge with no need for consideration of admission.    I have explained the patient´s condition, diagnoses and treatment plan based on the information available to me at this time. I have answered questions and addressed any concerns. The patient has a good  understanding of the patient´s diagnosis, condition, and treatment plan as can be expected at this point. The vital signs have been stable. The patient´s condition is stable and appropriate for discharge from the emergency department.      The patient will pursue further outpatient evaluation with the primary care physician or other designated or consulting physician as outlined in the discharge instructions. They are agreeable to this plan of care and follow-up instructions have been explained in detail. The patient has received these instructions in written format and has expressed an understanding of the discharge instructions. The patient is aware that any significant change in condition or  worsening of symptoms should prompt an immediate return to this or the closest emergency department or call to 911.  I have explained discharge medications and the need for follow up with the patient/caretakers. This was also printed in the discharge instructions. Patient was discharged with the following medications and follow up:      Medication List        New Prescriptions      hydrocortisone 1 % cream  Apply 1 Application topically to the appropriate area as directed 2 (Two) Times a Day.               Where to Get Your Medications        These medications were sent to [id number] - [address] AT [facility] - [phone] [facility] [phone] [address] [address]      Phone: [phone]   hydrocortisone 1 % cream      No follow-up provider specified.     Final diagnoses:   Bee sting, accidental or unintentional, initial encounter        ED Disposition       ED Disposition   Discharge    Condition   Stable    Comment   --               This medical record created using voice recognition software.             Jacey Sifuentes APRN  06/16/25 0196

## 2025-07-08 RX ORDER — ATORVASTATIN CALCIUM 80 MG/1
80 TABLET, FILM COATED ORAL NIGHTLY
Qty: 30 TABLET | Refills: 0 | Status: SHIPPED | OUTPATIENT
Start: 2025-07-08

## 2025-07-10 RX ORDER — CLOPIDOGREL BISULFATE 75 MG/1
75 TABLET ORAL DAILY
Qty: 30 TABLET | Refills: 3 | Status: SHIPPED | OUTPATIENT
Start: 2025-07-10

## 2025-08-01 DIAGNOSIS — I10 PRIMARY HYPERTENSION: ICD-10-CM

## 2025-08-01 RX ORDER — HYDROCHLOROTHIAZIDE 12.5 MG/1
12.5 TABLET ORAL DAILY
Qty: 90 TABLET | OUTPATIENT
Start: 2025-08-01

## 2025-08-01 RX ORDER — HYDROCHLOROTHIAZIDE 12.5 MG/1
12.5 TABLET ORAL DAILY
Qty: 30 TABLET | Refills: 5 | OUTPATIENT
Start: 2025-08-01

## 2025-08-01 RX ORDER — HYDROCHLOROTHIAZIDE 12.5 MG/1
12.5 TABLET ORAL DAILY
Qty: 90 TABLET | Refills: 1 | Status: SHIPPED | OUTPATIENT
Start: 2025-08-01

## 2025-08-01 NOTE — TELEPHONE ENCOUNTER
PT STOPPED IN TO CHECK ON HER REFILL REQUEST FOR HYDROCHLOROTHIAZIDE. IT IS PENDING ALREADY JUST WAITING FOR THE PROVIDER TO SIGN OFF.

## 2025-08-04 DIAGNOSIS — I10 PRIMARY HYPERTENSION: ICD-10-CM

## 2025-08-04 RX ORDER — HYDROCHLOROTHIAZIDE 12.5 MG/1
12.5 TABLET ORAL DAILY
Qty: 30 TABLET | OUTPATIENT
Start: 2025-08-04

## 2025-08-19 RX ORDER — POTASSIUM CHLORIDE 750 MG/1
10 TABLET, EXTENDED RELEASE ORAL DAILY
Qty: 30 TABLET | Refills: 1 | Status: SHIPPED | OUTPATIENT
Start: 2025-08-19